# Patient Record
Sex: FEMALE | Race: OTHER | Employment: FULL TIME | URBAN - METROPOLITAN AREA
[De-identification: names, ages, dates, MRNs, and addresses within clinical notes are randomized per-mention and may not be internally consistent; named-entity substitution may affect disease eponyms.]

---

## 2018-12-26 ENCOUNTER — EMPLOYEE HEALTH (OUTPATIENT)
Dept: OCCUPATIONAL MEDICINE | Age: 28
End: 2018-12-26
Attending: PHYSICIAN ASSISTANT

## 2018-12-26 DIAGNOSIS — Z01.84 IMMUNITY STATUS TESTING: Primary | ICD-10-CM

## 2018-12-26 PROCEDURE — 86787 VARICELLA-ZOSTER ANTIBODY: CPT

## 2018-12-26 PROCEDURE — 86480 TB TEST CELL IMMUN MEASURE: CPT

## 2019-03-06 ENCOUNTER — OFFICE VISIT (OUTPATIENT)
Dept: INTERNAL MEDICINE CLINIC | Facility: CLINIC | Age: 29
End: 2019-03-06
Payer: COMMERCIAL

## 2019-03-06 VITALS
RESPIRATION RATE: 16 BRPM | HEIGHT: 61.5 IN | BODY MASS INDEX: 32.99 KG/M2 | HEART RATE: 94 BPM | SYSTOLIC BLOOD PRESSURE: 110 MMHG | WEIGHT: 177 LBS | DIASTOLIC BLOOD PRESSURE: 76 MMHG

## 2019-03-06 DIAGNOSIS — E66.9 CLASS 1 OBESITY WITHOUT SERIOUS COMORBIDITY WITH BODY MASS INDEX (BMI) OF 32.0 TO 32.9 IN ADULT, UNSPECIFIED OBESITY TYPE: ICD-10-CM

## 2019-03-06 DIAGNOSIS — Z51.81 THERAPEUTIC DRUG MONITORING: Primary | ICD-10-CM

## 2019-03-06 PROBLEM — E66.811 CLASS 1 OBESITY WITHOUT SERIOUS COMORBIDITY WITH BODY MASS INDEX (BMI) OF 32.0 TO 32.9 IN ADULT: Status: ACTIVE | Noted: 2019-03-06

## 2019-03-06 PROCEDURE — 93000 ELECTROCARDIOGRAM COMPLETE: CPT | Performed by: NURSE PRACTITIONER

## 2019-03-06 PROCEDURE — 99204 OFFICE O/P NEW MOD 45 MIN: CPT | Performed by: NURSE PRACTITIONER

## 2019-03-06 RX ORDER — MULTIVIT-MIN/IRON FUM/FOLIC AC 7.5 MG-4
1 TABLET ORAL DAILY
COMMUNITY
End: 2021-02-03 | Stop reason: ALTCHOICE

## 2019-03-06 NOTE — PATIENT INSTRUCTIONS
PLAN:  Continue with medications: vvyase 30mg (1 tablet in the morning daily)  Got to the lab for blood work   Follow up with me in 1 month  Schedule follow up appointments:  Dietitian/nutritionist      Please try to work on the following dietary changes: clarity, sleep, and richelle to your daily life.

## 2019-03-06 NOTE — PROGRESS NOTES
HISTORY OF PRESENT ILLNESS  Patient presents with:  Weight Problem: found us online. phentermine 6 months ago lost 15 pounds was on for 3 months.  3 days weekly exercise    Mandy Kennethanibal is a 34year old female new to our office today for initiation of me negative  Glaucoma: negative   Depression/anxiety: negative  Constipation: negative  DVT: negative  Family or personal history of Pancreatic issues / Medullary Thyroid Cancer: negative  History of bariatric surgery: negative    REVIEW OF SYSTEMS  GENERAL: daily. Disp:  Rfl:      No current facility-administered medications on file prior to visit.      ASSESSMENT/PLAN  (Z51.81) Therapeutic drug monitoring  (primary encounter diagnosis)  Plan: VITAMIN D, 25-HYDROXY, VITAMIN B12, LIPID         PANEL, HEMOGLOBIN good results. Total face to face time was 40 minutes, more than 50% of the time was spent in counseling and/or coordination of care related to obesity and healthy diet and exercise.     Discussed:  · Counseled on comprehensive weight loss plan including choose whole grain options or more protein or vegetables (4-6 servings of vegetables per day)  6. Get a good night of sleep  7. Try to decrease stress in life     Please download apps:  1.  \"My Fitness Pal\" (other option is Lose it)) to help you to [facility]

## 2019-03-13 ENCOUNTER — TELEPHONE (OUTPATIENT)
Dept: INTERNAL MEDICINE CLINIC | Facility: CLINIC | Age: 29
End: 2019-03-13

## 2019-03-13 ENCOUNTER — LAB ENCOUNTER (OUTPATIENT)
Dept: LAB | Age: 29
End: 2019-03-13
Attending: NURSE PRACTITIONER
Payer: COMMERCIAL

## 2019-03-13 DIAGNOSIS — Z51.81 THERAPEUTIC DRUG MONITORING: ICD-10-CM

## 2019-03-13 DIAGNOSIS — E66.9 CLASS 1 OBESITY WITHOUT SERIOUS COMORBIDITY WITH BODY MASS INDEX (BMI) OF 32.0 TO 32.9 IN ADULT, UNSPECIFIED OBESITY TYPE: ICD-10-CM

## 2019-03-13 LAB
ALBUMIN SERPL-MCNC: 3.8 G/DL (ref 3.4–5)
ALBUMIN/GLOB SERPL: 1 {RATIO} (ref 1–2)
ALP LIVER SERPL-CCNC: 64 U/L (ref 37–98)
ALT SERPL-CCNC: 16 U/L (ref 13–56)
ANION GAP SERPL CALC-SCNC: 7 MMOL/L (ref 0–18)
AST SERPL-CCNC: 13 U/L (ref 15–37)
BASOPHILS # BLD AUTO: 0.02 X10(3) UL (ref 0–0.2)
BASOPHILS NFR BLD AUTO: 0.4 %
BILIRUB SERPL-MCNC: 0.4 MG/DL (ref 0.1–2)
BUN BLD-MCNC: 7 MG/DL (ref 7–18)
BUN/CREAT SERPL: 9.1 (ref 10–20)
CALCIUM BLD-MCNC: 9.1 MG/DL (ref 8.5–10.1)
CHLORIDE SERPL-SCNC: 110 MMOL/L (ref 98–107)
CHOLEST SMN-MCNC: 154 MG/DL (ref ?–200)
CO2 SERPL-SCNC: 24 MMOL/L (ref 21–32)
CREAT BLD-MCNC: 0.77 MG/DL (ref 0.55–1.02)
DEPRECATED RDW RBC AUTO: 41.8 FL (ref 35.1–46.3)
EOSINOPHIL # BLD AUTO: 0.15 X10(3) UL (ref 0–0.7)
EOSINOPHIL NFR BLD AUTO: 3.3 %
ERYTHROCYTE [DISTWIDTH] IN BLOOD BY AUTOMATED COUNT: 12.6 % (ref 11–15)
EST. AVERAGE GLUCOSE BLD GHB EST-MCNC: 94 MG/DL (ref 68–126)
GLOBULIN PLAS-MCNC: 3.8 G/DL (ref 2.8–4.4)
GLUCOSE BLD-MCNC: 92 MG/DL (ref 70–99)
HBA1C MFR BLD HPLC: 4.9 % (ref ?–5.7)
HCT VFR BLD AUTO: 40.3 % (ref 35–48)
HDLC SERPL-MCNC: 44 MG/DL (ref 40–59)
HGB BLD-MCNC: 13.6 G/DL (ref 12–16)
IMM GRANULOCYTES # BLD AUTO: 0.02 X10(3) UL (ref 0–1)
IMM GRANULOCYTES NFR BLD: 0.4 %
LDLC SERPL CALC-MCNC: 95 MG/DL (ref ?–100)
LYMPHOCYTES # BLD AUTO: 1.98 X10(3) UL (ref 1–4)
LYMPHOCYTES NFR BLD AUTO: 43.5 %
M PROTEIN MFR SERPL ELPH: 7.6 G/DL (ref 6.4–8.2)
MCH RBC QN AUTO: 30.7 PG (ref 26–34)
MCHC RBC AUTO-ENTMCNC: 33.7 G/DL (ref 31–37)
MCV RBC AUTO: 91 FL (ref 80–100)
MONOCYTES # BLD AUTO: 0.3 X10(3) UL (ref 0.1–1)
MONOCYTES NFR BLD AUTO: 6.6 %
NEUTROPHILS # BLD AUTO: 2.08 X10 (3) UL (ref 1.5–7.7)
NEUTROPHILS # BLD AUTO: 2.08 X10(3) UL (ref 1.5–7.7)
NEUTROPHILS NFR BLD AUTO: 45.8 %
NONHDLC SERPL-MCNC: 110 MG/DL (ref ?–130)
OSMOLALITY SERPL CALC.SUM OF ELEC: 290 MOSM/KG (ref 275–295)
PLATELET # BLD AUTO: 263 10(3)UL (ref 150–450)
POTASSIUM SERPL-SCNC: 4.3 MMOL/L (ref 3.5–5.1)
RBC # BLD AUTO: 4.43 X10(6)UL (ref 3.8–5.3)
SODIUM SERPL-SCNC: 141 MMOL/L (ref 136–145)
TRIGL SERPL-MCNC: 73 MG/DL (ref 30–149)
VIT B12 SERPL-MCNC: 859 PG/ML (ref 193–986)
VIT D+METAB SERPL-MCNC: 21.6 NG/ML (ref 30–100)
VLDLC SERPL CALC-MCNC: 15 MG/DL (ref 0–30)
WBC # BLD AUTO: 4.6 X10(3) UL (ref 4–11)

## 2019-03-13 PROCEDURE — 85025 COMPLETE CBC W/AUTO DIFF WBC: CPT | Performed by: NURSE PRACTITIONER

## 2019-03-13 PROCEDURE — 80053 COMPREHEN METABOLIC PANEL: CPT | Performed by: NURSE PRACTITIONER

## 2019-03-13 PROCEDURE — 82306 VITAMIN D 25 HYDROXY: CPT | Performed by: NURSE PRACTITIONER

## 2019-03-13 PROCEDURE — 82607 VITAMIN B-12: CPT | Performed by: NURSE PRACTITIONER

## 2019-03-13 PROCEDURE — 80061 LIPID PANEL: CPT | Performed by: NURSE PRACTITIONER

## 2019-03-13 PROCEDURE — 83036 HEMOGLOBIN GLYCOSYLATED A1C: CPT | Performed by: NURSE PRACTITIONER

## 2019-03-15 NOTE — PROGRESS NOTES
Your blood work are within normal except:   A1c, b12 normal  Low Vitamin d :  please start taking ergocalciferol 50,000 U q week 8 weeks (please order quant #8 no refills).  Then start daily maintenance vitamin D 2000 Units    Cholesterol: stable    If you

## 2019-03-18 NOTE — TELEPHONE ENCOUNTER
Patient informed that Vyvanse is not going to give her extra energy and she needs to take it for the month and f/u to discuss. She will proceed.

## 2019-03-27 ENCOUNTER — OFFICE VISIT (OUTPATIENT)
Dept: PODIATRY CLINIC | Facility: CLINIC | Age: 29
End: 2019-03-27
Payer: COMMERCIAL

## 2019-03-27 ENCOUNTER — TELEPHONE (OUTPATIENT)
Dept: PODIATRY CLINIC | Facility: CLINIC | Age: 29
End: 2019-03-27

## 2019-03-27 DIAGNOSIS — M77.9 TENDONITIS: ICD-10-CM

## 2019-03-27 DIAGNOSIS — M79.671 PAIN IN BOTH FEET: Primary | ICD-10-CM

## 2019-03-27 DIAGNOSIS — M79.672 PAIN IN BOTH FEET: Primary | ICD-10-CM

## 2019-03-27 PROCEDURE — 99212 OFFICE O/P EST SF 10 MIN: CPT | Performed by: PODIATRIST

## 2019-03-27 PROCEDURE — 99203 OFFICE O/P NEW LOW 30 MIN: CPT | Performed by: PODIATRIST

## 2019-03-27 NOTE — TELEPHONE ENCOUNTER
S/w pt and she states she checked with her insurance ahs they cover orthotics, however she has a large deductible and the cost would go towards her deductible first. Pt wanted to know around how much orthotics cost. I informed her they roughly cost $424 fo

## 2019-03-27 NOTE — PROGRESS NOTES
HPI:    Patient ID: Margie Morgan is a 34year old female. This pleasant 27-year-old female presents to me as a new patient states that she is self-referred. Patient has been having pain over an extended period of time associated with both of her feet. depending upon shoes and added support I discussed further support and discussed long-term orthotic control. She was given information to seek approval for long-term orthotic control.   I modified her present temporary devices in an effort to give more sup

## 2019-04-03 ENCOUNTER — OFFICE VISIT (OUTPATIENT)
Dept: INTERNAL MEDICINE CLINIC | Facility: CLINIC | Age: 29
End: 2019-04-03
Payer: COMMERCIAL

## 2019-04-03 VITALS
HEART RATE: 108 BPM | BODY MASS INDEX: 31.31 KG/M2 | SYSTOLIC BLOOD PRESSURE: 116 MMHG | HEIGHT: 61.5 IN | DIASTOLIC BLOOD PRESSURE: 68 MMHG | RESPIRATION RATE: 16 BRPM | WEIGHT: 168 LBS

## 2019-04-03 DIAGNOSIS — E66.9 CLASS 1 OBESITY WITHOUT SERIOUS COMORBIDITY WITH BODY MASS INDEX (BMI) OF 32.0 TO 32.9 IN ADULT, UNSPECIFIED OBESITY TYPE: ICD-10-CM

## 2019-04-03 DIAGNOSIS — Z51.81 THERAPEUTIC DRUG MONITORING: Primary | ICD-10-CM

## 2019-04-03 DIAGNOSIS — Z71.3 DIETARY COUNSELING: ICD-10-CM

## 2019-04-03 PROCEDURE — 99215 OFFICE O/P EST HI 40 MIN: CPT | Performed by: NURSE PRACTITIONER

## 2019-04-03 RX ORDER — TOPIRAMATE 25 MG/1
25 TABLET ORAL 2 TIMES DAILY
Qty: 60 TABLET | Refills: 2 | Status: SHIPPED | OUTPATIENT
Start: 2019-04-03 | End: 2019-05-06

## 2019-04-03 NOTE — PROGRESS NOTES
Preventative Counseling for Diet and Exercise     /68   Pulse 108   Resp 16   Ht 61.5\"   Wt 168 lb   LMP 03/11/2019   BMI 31.23 kg/m²   Body mass index is 31.23 kg/m².     Wt Readings from Last 6 Encounters:  04/03/19 : 168 lb  03/06/19 : 177 lb    I

## 2019-04-03 NOTE — PROGRESS NOTES
HISTORY OF PRESENT ILLNESS  Patient presents with:  Weight Check: down 9 lbs    Heidi Floyd is a 34year old female here for follow up with medical weight loss program for the treatment of overweight, obesity, or morbid obesity.  Patient has lost -# 9 l bariatric surgery: negative    REVIEW OF SYSTEMS  GENERAL: negative for activity change, appetite change and fatigue  LUNGS: denies shortness of breath with exertion, no apnea  CARDIOVASCULAR: denies chest pain on exertion, denies palpitations or pedal kenneth tablet by mouth daily. Disp:  Rfl:      No current facility-administered medications on file prior to visit.      ASSESSMENT/PLAN  (Z51.81) Therapeutic drug monitoring  (primary encounter diagnosis)    (E66.9,  Z68.32) Class 1 obesity without serious comorb dietitian  -low carb high protein  -portion control  -Limit carbohydrates  -Eat breakfast every day   -Don't skip meals   -Read nutrition labels and keep a food log  -drink a lot of water 65 oz of water per day  - Do not drink your calories (no soda, juice be able to see if you are eating the right amount of calories, protein, carbs                With My Fitness Pal-->When you set-up the marcelino or need to adjust settings:                Goals should include:                  Lose 1.5-2 lbs per week

## 2019-04-03 NOTE — PATIENT INSTRUCTIONS
Keep up the great work! !     PLAN:  Continue with medications: vyvanse 40mg, Topamax: take 1 tablet before dinner for the first week (to decrease side effects) and than the second week--> increase to 1 tablet in the morning and 1 tablet before dinner (2 ta help with exercise/activity which takes 7 minutes of your day and that you can do at home! 3. \"Calm\" or \"Headspace\" which helps with mindfulness, meditation, clarity, sleep, and richelle to your daily life.

## 2019-05-05 PROBLEM — Z51.81 THERAPEUTIC DRUG MONITORING: Chronic | Status: ACTIVE | Noted: 2019-03-06

## 2019-05-06 ENCOUNTER — OFFICE VISIT (OUTPATIENT)
Dept: INTERNAL MEDICINE CLINIC | Facility: CLINIC | Age: 29
End: 2019-05-06
Payer: COMMERCIAL

## 2019-05-06 VITALS
HEART RATE: 100 BPM | BODY MASS INDEX: 32.24 KG/M2 | WEIGHT: 173 LBS | SYSTOLIC BLOOD PRESSURE: 120 MMHG | RESPIRATION RATE: 16 BRPM | DIASTOLIC BLOOD PRESSURE: 80 MMHG | HEIGHT: 61.5 IN

## 2019-05-06 DIAGNOSIS — E66.9 CLASS 1 OBESITY WITHOUT SERIOUS COMORBIDITY WITH BODY MASS INDEX (BMI) OF 32.0 TO 32.9 IN ADULT, UNSPECIFIED OBESITY TYPE: ICD-10-CM

## 2019-05-06 DIAGNOSIS — Z51.81 THERAPEUTIC DRUG MONITORING: Primary | ICD-10-CM

## 2019-05-06 PROCEDURE — 99214 OFFICE O/P EST MOD 30 MIN: CPT | Performed by: NURSE PRACTITIONER

## 2019-05-06 RX ORDER — DIETHYLPROPION HYDROCHLORIDE 75 MG/1
1 TABLET ORAL EVERY MORNING
Qty: 30 TABLET | Refills: 0 | Status: SHIPPED | OUTPATIENT
Start: 2019-05-06 | End: 2019-06-05

## 2019-05-06 NOTE — PROGRESS NOTES
HISTORY OF PRESENT ILLNESS  Patient presents with:  Weight Check: 5 pound weight gain    Judge Bueno is a 34year old female here for follow up with medical weight loss program for the treatment of overweight, obesity, or morbid obesity.  Patient has ga Pancreatic issues / Medullary Thyroid Cancer: negative  History of bariatric surgery: negative    REVIEW OF SYSTEMS  GENERAL: negative for activity change, appetite change and fatigue  LUNGS: denies shortness of breath with exertion, no apnea  CARDIOVASCUL (two) times daily. Refer to discharge instructions for dosing. (Patient taking differently: Take 25 mg by mouth daily. Taking 1 tablet daily ) Disp: 60 tablet Rfl: 2   Cholecalciferol (VITAMIN D3) 05231 units Oral Cap Take 1 capsule by mouth once a week.  Didier Adams loss  · Will continue with high dose vitamin d   Contraindications: has been off and on phentermine in the past w/o good results.     Discussed:  · Counseled on comprehensive weight loss plan including attention to nutrition, exercise and behavior/stress ma in life     Please download apps:  1.  \"My Fitness Pal\" (other option is Lose it)) to help you to monitor daily dietary intake and you will be able to see if you are eating the right amount of calories, protein, carbs                With My Fitness Pal-->

## 2019-05-06 NOTE — PATIENT INSTRUCTIONS
PLAN:  Continue with medications: diethyloprion 75mg, stop vvyase   Follow up with me in 1 month  Schedule follow up appointments:  Dietitian/nutritionist      Please try to work on the following dietary changes:  1.  Drink lots of water and cut down on sod

## 2019-07-18 ENCOUNTER — TELEPHONE (OUTPATIENT)
Dept: PODIATRY CLINIC | Facility: CLINIC | Age: 29
End: 2019-07-18

## 2019-07-18 NOTE — TELEPHONE ENCOUNTER
See te 3-27-19. Pt has question on orthotics. Pt has a high deductible and was given a phone number to a company to get the orthotics. Pt has misplaced the phone number.   Please call pt

## 2019-07-18 NOTE — TELEPHONE ENCOUNTER
Dr. Halina Lundborg please advise. Is there a company through which patient can get orthotics at a cheaper cash price? Deductible is very high at this time. Thank you.

## 2019-07-25 ENCOUNTER — OFFICE VISIT (OUTPATIENT)
Dept: OBGYN CLINIC | Facility: CLINIC | Age: 29
End: 2019-07-25
Payer: COMMERCIAL

## 2019-07-25 VITALS
BODY MASS INDEX: 32.47 KG/M2 | HEIGHT: 61.5 IN | DIASTOLIC BLOOD PRESSURE: 74 MMHG | SYSTOLIC BLOOD PRESSURE: 108 MMHG | WEIGHT: 174.19 LBS

## 2019-07-25 DIAGNOSIS — Z01.419 WELL WOMAN EXAM WITH ROUTINE GYNECOLOGICAL EXAM: Primary | ICD-10-CM

## 2019-07-25 DIAGNOSIS — R10.2 PELVIC PAIN: ICD-10-CM

## 2019-07-25 DIAGNOSIS — R51.9 GENERALIZED HEADACHES: ICD-10-CM

## 2019-07-25 PROCEDURE — 99385 PREV VISIT NEW AGE 18-39: CPT | Performed by: OBSTETRICS & GYNECOLOGY

## 2019-07-26 NOTE — PROGRESS NOTES
Annual Gyn Exam    HPI Radha Mathew is a new patient to our practice. She is here for an annual gynecologic exam.  She also has complaints of RLQ discomfort for the last few weeks. No nausea/vomiting, diarrhea/constipation.   Denies dysuria, hematuria or othe chills, fatigue and fever. HENT: Negative for hearing loss. Eyes: Negative for visual disturbance. Respiratory: Negative for cough, shortness of breath and wheezing. Cardiovascular: Negative for palpitations.    Gastrointestinal: Negative for abdo BMI 32.38 kg/m²     Assessment and Plan  1. Well woman exam with routine gynecological exam  A: 34 y.o. Victor Manuel Randhawa here for annual gynecologic evaluation  Exam, self breast exam demonstration done with pap smear.   Discussed contraception, currently trying to

## 2019-07-27 ENCOUNTER — APPOINTMENT (OUTPATIENT)
Dept: LAB | Facility: HOSPITAL | Age: 29
End: 2019-07-27
Attending: OBSTETRICS & GYNECOLOGY
Payer: COMMERCIAL

## 2019-07-27 DIAGNOSIS — R10.2 PELVIC PAIN: ICD-10-CM

## 2019-07-27 LAB
BILIRUB UR QL STRIP.AUTO: NEGATIVE
CLARITY UR REFRACT.AUTO: CLEAR
GLUCOSE UR STRIP.AUTO-MCNC: NEGATIVE MG/DL
KETONES UR STRIP.AUTO-MCNC: NEGATIVE MG/DL
LEUKOCYTE ESTERASE UR QL STRIP.AUTO: NEGATIVE
NITRITE UR QL STRIP.AUTO: NEGATIVE
PH UR STRIP.AUTO: 6 [PH] (ref 4.5–8)
PROT UR STRIP.AUTO-MCNC: NEGATIVE MG/DL
SP GR UR STRIP.AUTO: 1.01 (ref 1–1.03)
UROBILINOGEN UR STRIP.AUTO-MCNC: <2 MG/DL

## 2019-07-27 PROCEDURE — 87086 URINE CULTURE/COLONY COUNT: CPT

## 2019-07-27 PROCEDURE — 81001 URINALYSIS AUTO W/SCOPE: CPT

## 2019-07-30 ENCOUNTER — TELEPHONE (OUTPATIENT)
Dept: OBGYN CLINIC | Facility: CLINIC | Age: 29
End: 2019-07-30

## 2019-07-30 LAB
HPV I/H RISK 1 DNA SPEC QL NAA+PROBE: POSITIVE
HPV16 DNA CVX QL PROBE+SIG AMP: NEGATIVE
HPV18 DNA CVX QL PROBE+SIG AMP: NEGATIVE

## 2019-07-30 NOTE — TELEPHONE ENCOUNTER
Pt called and informed of message below and Dr. Joe Lowery recommendations--    ---- Message from Francia Amanda MD sent at 7/30/2019 12:18 PM CDT -----  Please contact patient and notify her that her Pap showed ASCUS with positive HPV.   She will need to

## 2019-07-30 NOTE — TELEPHONE ENCOUNTER
----- Message from Leigh Zimmerman MD sent at 7/30/2019 12:18 PM CDT -----  Please contact patient and notify her that her Pap showed ASCUS with positive HPV. She will need to return for a discussion on the results and a colposcopy.   These can both be d

## 2019-08-01 NOTE — TELEPHONE ENCOUNTER
Spoke to pt. Pt states she is continuing to have foot pain despite taking Advil and icing. Pt states she has a $5,000 deductible that has not been met and is willing to pay out of pocket. Pt asking about setting up a payment plan for the orthotics.  Gave pt

## 2019-08-07 NOTE — TELEPHONE ENCOUNTER
Pt Name and  verified. Pt has scheduled colp for next Tuesday at [facility] with ROMAN.

## 2019-08-13 ENCOUNTER — OFFICE VISIT (OUTPATIENT)
Dept: OBGYN CLINIC | Facility: CLINIC | Age: 29
End: 2019-08-13
Payer: COMMERCIAL

## 2019-08-13 VITALS — DIASTOLIC BLOOD PRESSURE: 70 MMHG | SYSTOLIC BLOOD PRESSURE: 117 MMHG

## 2019-08-13 DIAGNOSIS — R87.610 ASCUS OF CERVIX WITH NEGATIVE HIGH RISK HPV: Primary | ICD-10-CM

## 2019-08-13 DIAGNOSIS — Z01.812 PRE-PROCEDURAL LABORATORY EXAMINATION: ICD-10-CM

## 2019-08-13 LAB
CONTROL LINE PRESENT WITH A CLEAR BACKGROUND (YES/NO): YES YES/NO
KIT LOT #: NORMAL NUMERIC
PREGNANCY TEST, URINE: NEGATIVE

## 2019-08-13 PROCEDURE — 57454 BX/CURETT OF CERVIX W/SCOPE: CPT | Performed by: OBSTETRICS & GYNECOLOGY

## 2019-08-13 PROCEDURE — 81025 URINE PREGNANCY TEST: CPT | Performed by: OBSTETRICS & GYNECOLOGY

## 2019-08-13 NOTE — PROCEDURES
Patient counseled on the results of her abnormal Pap smear which showed ASCUS with positive high risk HPV. We reviewed cervical dysplasia, HPV and colposcopy. Patient counseled on risks and side effects of colposcopy.     [facility]  Obstetrics and Harjeet Tompkins

## 2019-08-20 ENCOUNTER — PATIENT MESSAGE (OUTPATIENT)
Dept: OBGYN CLINIC | Facility: CLINIC | Age: 29
End: 2019-08-20

## 2019-08-21 ENCOUNTER — HOSPITAL ENCOUNTER (OUTPATIENT)
Dept: ULTRASOUND IMAGING | Facility: HOSPITAL | Age: 29
Discharge: HOME | End: 2019-08-21
Attending: OBSTETRICS & GYNECOLOGY
Payer: COMMERCIAL

## 2019-08-21 DIAGNOSIS — R10.2 PELVIC PAIN: ICD-10-CM

## 2019-08-21 PROCEDURE — 76830 TRANSVAGINAL US NON-OB: CPT | Performed by: OBSTETRICS & GYNECOLOGY

## 2019-08-21 PROCEDURE — 76856 US EXAM PELVIC COMPLETE: CPT | Performed by: OBSTETRICS & GYNECOLOGY

## 2019-08-22 NOTE — TELEPHONE ENCOUNTER
Please have patient make an appointment to come in and discuss her colposcopic biopsy result. Please schedule her ASAP. I will be at the hospital tomorrow and if possible, she can be brought in after 3:30 and I can come and talk to her.

## 2019-08-22 NOTE — TELEPHONE ENCOUNTER
From: Lorenzo Beth  To:  Rosendo Kovacs MD  Sent: 8/20/2019 4:21 PM CDT  Subject: Test Results Question    I have a question about SURGICAL PATHOLOGY TISSUE resulted on 8/15/19 at 2:11 PM.    May you interpret these results and what the next

## 2019-08-22 NOTE — PROGRESS NOTES
Pt rc and will be able to come tomorrow at 3:30. Pt given instructions on how to get to [facility] office. Verbalized understanding.

## 2019-08-23 ENCOUNTER — TELEPHONE (OUTPATIENT)
Dept: OBGYN CLINIC | Facility: CLINIC | Age: 29
End: 2019-08-23

## 2019-08-23 ENCOUNTER — OFFICE VISIT (OUTPATIENT)
Dept: OBGYN CLINIC | Facility: CLINIC | Age: 29
End: 2019-08-23
Payer: COMMERCIAL

## 2019-08-23 VITALS — DIASTOLIC BLOOD PRESSURE: 73 MMHG | WEIGHT: 173 LBS | SYSTOLIC BLOOD PRESSURE: 111 MMHG | BODY MASS INDEX: 32 KG/M2

## 2019-08-23 DIAGNOSIS — D06.9 CIN III (CERVICAL INTRAEPITHELIAL NEOPLASIA GRADE III) WITH SEVERE DYSPLASIA: Primary | ICD-10-CM

## 2019-08-23 PROCEDURE — 99213 OFFICE O/P EST LOW 20 MIN: CPT | Performed by: OBSTETRICS & GYNECOLOGY

## 2019-08-24 NOTE — PROGRESS NOTES
[facility], [facility]. AND [facility]  Obstetrics and Gynecology  Focused Gynecology Problem Exam  Porfirio Arnold MD    [address] [facility] is a 34year old female presenting for Follow - Up (Colposcopy results)  . 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 We reviewed dysplasia and HPV. I counseled  that he does not need any screening or testing.   I co

## 2019-08-24 NOTE — TELEPHONE ENCOUNTER
Please schedule patient for an in office LEEP procedure at [facility].  We will need to have the appropriate nursing staff available. It appears that she is scheduled for an appointment on 8/27/2019 in [place] but this will need to be changed.

## 2019-08-26 ENCOUNTER — TELEPHONE (OUTPATIENT)
Dept: OBGYN CLINIC | Facility: CLINIC | Age: 29
End: 2019-08-26

## 2019-08-26 NOTE — TELEPHONE ENCOUNTER
Spoke with pt and offered time and date for Leep procedure. Pt requesting to have LEEP procedure on September 23rd the earliest time available.  Informed pt that office staff will look at this date to see if available and a call would be given back sometime

## 2019-08-27 NOTE — TELEPHONE ENCOUNTER
Breana Hyde RN 15 hours ago (5:35 PM)         Spoke with pt and offered time and date for Leep procedure. Pt requesting to have LEEP procedure on September 23rd the earliest time available.  Informed pt that office staff will look at this date to see i

## 2019-09-10 NOTE — TELEPHONE ENCOUNTER
[phone] (home)   LEEP scheduled on 08/23 at [facility] 1:00. Advised to arrive 15 minutes prior and premedicate 30- to an hour. Verbalized understanding. No further question.

## 2019-09-14 ENCOUNTER — HOSPITAL ENCOUNTER (OUTPATIENT)
Age: 29
Discharge: HOME | End: 2019-09-14
Attending: EMERGENCY MEDICINE
Payer: COMMERCIAL

## 2019-09-14 ENCOUNTER — APPOINTMENT (OUTPATIENT)
Dept: GENERAL RADIOLOGY | Age: 29
End: 2019-09-14
Attending: EMERGENCY MEDICINE
Payer: COMMERCIAL

## 2019-09-14 VITALS
HEIGHT: 62 IN | HEART RATE: 85 BPM | DIASTOLIC BLOOD PRESSURE: 70 MMHG | TEMPERATURE: 98 F | OXYGEN SATURATION: 100 % | WEIGHT: 168 LBS | BODY MASS INDEX: 30.91 KG/M2 | SYSTOLIC BLOOD PRESSURE: 105 MMHG | RESPIRATION RATE: 18 BRPM

## 2019-09-14 DIAGNOSIS — S92.415A NONDISPLACED FRACTURE OF PROXIMAL PHALANX OF LEFT GREAT TOE, INITIAL ENCOUNTER FOR CLOSED FRACTURE: Primary | ICD-10-CM

## 2019-09-14 PROCEDURE — 28490 TREAT BIG TOE FRACTURE: CPT

## 2019-09-14 PROCEDURE — 99213 OFFICE O/P EST LOW 20 MIN: CPT

## 2019-09-14 PROCEDURE — 99202 OFFICE O/P NEW SF 15 MIN: CPT

## 2019-09-14 PROCEDURE — 73660 X-RAY EXAM OF TOE(S): CPT | Performed by: EMERGENCY MEDICINE

## 2019-09-14 NOTE — ED PROVIDER NOTES
Patient Seen in: [facility] In [address] [place] [address]    History   Patient presents with:  Lower Extremity Injury (musculoskeletal)    Stated Complaint: left toe injury    HPI    The patient is a 80-year-old female with no significant past medica extremities bilaterally  Extremities: There is mild focal swelling and tenderness to the proximal phalanx and IP joint of the left great toe.   Skin: No pallor, no redness or warmth to the touch      ED Course   Labs Reviewed - No data to display       [facility]

## 2019-09-23 ENCOUNTER — OFFICE VISIT (OUTPATIENT)
Dept: OBGYN CLINIC | Facility: CLINIC | Age: 29
End: 2019-09-23
Payer: COMMERCIAL

## 2019-09-23 VITALS
HEART RATE: 90 BPM | WEIGHT: 178 LBS | SYSTOLIC BLOOD PRESSURE: 123 MMHG | BODY MASS INDEX: 33 KG/M2 | DIASTOLIC BLOOD PRESSURE: 77 MMHG

## 2019-09-23 DIAGNOSIS — D06.9 CIN III (CERVICAL INTRAEPITHELIAL NEOPLASIA GRADE III) WITH SEVERE DYSPLASIA: Primary | ICD-10-CM

## 2019-09-23 DIAGNOSIS — Z01.812 PRE-PROCEDURAL LABORATORY EXAMINATION: ICD-10-CM

## 2019-09-23 PROCEDURE — 81025 URINE PREGNANCY TEST: CPT | Performed by: OBSTETRICS & GYNECOLOGY

## 2019-09-23 PROCEDURE — 57460 BX OF CERVIX W/SCOPE LEEP: CPT | Performed by: OBSTETRICS & GYNECOLOGY

## 2019-09-23 NOTE — PROCEDURES
[facility]  Obstetrics and Gynecology  Loop Electrosurgical Excision Procedure (LEEP) Note  Abdirizak Mcleod MD    Loop Electrosurgical Excision Procedure (Leep): There were no vitals taken for this visit. LMP: No LMP recorded.   A: 34 y.o. Jonathan Kincaid

## 2019-09-30 ENCOUNTER — TELEPHONE (OUTPATIENT)
Dept: OBGYN CLINIC | Facility: CLINIC | Age: 29
End: 2019-09-30

## 2019-10-03 ENCOUNTER — OFFICE VISIT (OUTPATIENT)
Dept: PODIATRY CLINIC | Facility: CLINIC | Age: 29
End: 2019-10-03
Payer: COMMERCIAL

## 2019-10-03 DIAGNOSIS — M72.2 PLANTAR FASCIITIS, BILATERAL: ICD-10-CM

## 2019-10-03 DIAGNOSIS — S92.415A CLOSED NONDISPLACED FRACTURE OF PROXIMAL PHALANX OF LEFT GREAT TOE, INITIAL ENCOUNTER: Primary | ICD-10-CM

## 2019-10-03 PROCEDURE — 99213 OFFICE O/P EST LOW 20 MIN: CPT | Performed by: PODIATRIST

## 2019-10-03 NOTE — PROGRESS NOTES
HPI:    Patient ID: Lore Apple is a 34year old female. 59-year-old female presents for follow-up in reference to 2 issues. The recent issue is an injury to the left great toe.   She kicked a box of wood and in doing so broke her toe about 3 diagnosis)  Plantar fasciitis, bilateral    No orders of the defined types were placed in this encounter.       Meds This Visit:  Requested Prescriptions      No prescriptions requested or ordered in this encounter       Imaging & Referrals:  None       ID#

## 2019-10-07 ENCOUNTER — OFFICE VISIT (OUTPATIENT)
Dept: OBGYN CLINIC | Facility: CLINIC | Age: 29
End: 2019-10-07
Payer: COMMERCIAL

## 2019-10-07 VITALS
HEART RATE: 107 BPM | WEIGHT: 175 LBS | DIASTOLIC BLOOD PRESSURE: 74 MMHG | BODY MASS INDEX: 32 KG/M2 | SYSTOLIC BLOOD PRESSURE: 121 MMHG

## 2019-10-07 DIAGNOSIS — N87.1 CIN II (CERVICAL INTRAEPITHELIAL NEOPLASIA II): Primary | ICD-10-CM

## 2019-10-07 PROCEDURE — 99213 OFFICE O/P EST LOW 20 MIN: CPT | Performed by: OBSTETRICS & GYNECOLOGY

## 2019-10-08 NOTE — PROGRESS NOTES
[facility], [facility]  Obstetrics and Gynecology  Focused Gynecology Problem Exam  Gerson Babb MD    [address] is a 34year old female presenting for Follow - Up (2wks after LEEP procedure. )  . HPI:   Patient presents with:   Follow - Up: 2 Smoker      Smokeless tobacco: Never Used    Substance and Sexual Activity      Alcohol use: No        Frequency: Never      Drug use: Never      Sexual activity: Not on file    Lifestyle      Physical activity:        Days per week: Not on file        Min counseling the patient and/or coordinating care. ORDERS:   No orders of the defined types were placed in this encounter.     PRESCRIPTIONS:     Requested Prescriptions      No prescriptions requested or ordered in this encounter     IMAGING/ REFERRALS:

## 2019-10-29 ENCOUNTER — HOSPITAL ENCOUNTER (OUTPATIENT)
Dept: GENERAL RADIOLOGY | Facility: HOSPITAL | Age: 29
Discharge: HOME | End: 2019-10-29
Attending: PODIATRIST
Payer: COMMERCIAL

## 2019-10-29 ENCOUNTER — OFFICE VISIT (OUTPATIENT)
Dept: PODIATRY CLINIC | Facility: CLINIC | Age: 29
End: 2019-10-29
Payer: COMMERCIAL

## 2019-10-29 DIAGNOSIS — Z47.89 ORTHOPEDIC AFTERCARE: ICD-10-CM

## 2019-10-29 DIAGNOSIS — Z47.89 ORTHOPEDIC AFTERCARE: Primary | ICD-10-CM

## 2019-10-29 DIAGNOSIS — S92.415D CLOSED NONDISPLACED FRACTURE OF PROXIMAL PHALANX OF LEFT GREAT TOE WITH ROUTINE HEALING, SUBSEQUENT ENCOUNTER: ICD-10-CM

## 2019-10-29 PROCEDURE — L3000 FT INSERT UCB BERKELEY SHELL: HCPCS | Performed by: PODIATRIST

## 2019-10-29 PROCEDURE — 29799 UNLISTED PX CASTING/STRPG: CPT | Performed by: PODIATRIST

## 2019-10-29 PROCEDURE — 73630 X-RAY EXAM OF FOOT: CPT | Performed by: PODIATRIST

## 2019-10-29 NOTE — PROGRESS NOTES
HPI:    Patient ID: Drew Escamilla is a 34year old female. 77-year-old female presents for 2 reasons. The first is in reference to the fracture of the left great toe.   Patient states that she is doing extremely well and progressing as she woul

## 2019-11-27 ENCOUNTER — TELEPHONE (OUTPATIENT)
Dept: PODIATRY CLINIC | Facility: CLINIC | Age: 29
End: 2019-11-27

## 2019-11-27 NOTE — TELEPHONE ENCOUNTER
Called and left pt a VM to inform them orthotics are ready to be picked up. When pt calls back please schedule an appt with  at [facility] to do so. Thank You.

## 2020-01-21 ENCOUNTER — OFFICE VISIT (OUTPATIENT)
Dept: PODIATRY CLINIC | Facility: CLINIC | Age: 30
End: 2020-01-21
Payer: COMMERCIAL

## 2020-01-21 ENCOUNTER — OFFICE VISIT (OUTPATIENT)
Dept: FAMILY MEDICINE CLINIC | Facility: CLINIC | Age: 30
End: 2020-01-21
Payer: COMMERCIAL

## 2020-01-21 VITALS
OXYGEN SATURATION: 98 % | HEIGHT: 63 IN | DIASTOLIC BLOOD PRESSURE: 56 MMHG | BODY MASS INDEX: 34.2 KG/M2 | WEIGHT: 193 LBS | TEMPERATURE: 98 F | HEART RATE: 87 BPM | SYSTOLIC BLOOD PRESSURE: 121 MMHG | RESPIRATION RATE: 18 BRPM

## 2020-01-21 DIAGNOSIS — Z02.1 PHYSICAL EXAM, PRE-EMPLOYMENT: Primary | ICD-10-CM

## 2020-01-21 DIAGNOSIS — M72.2 PLANTAR FASCIITIS, BILATERAL: Primary | ICD-10-CM

## 2020-01-21 PROCEDURE — 99395 PREV VISIT EST AGE 18-39: CPT | Performed by: NURSE PRACTITIONER

## 2020-01-21 NOTE — PROGRESS NOTES
HPI:    Patient ID: Cristian Lyles is a 34year old female. 70-year-old female presents for the purpose of long-term orthotic. I reviewed their gradual break-in. And I do not anticipate areas of irritation.   Patient is well-informed and indica

## 2020-01-22 NOTE — PROGRESS NOTES
CHIEF COMPLAINT:     Employment Physical Exam    HPI:   Kaleb Mcgarry is a 34year old female who presents for an employment physical exam.     Company requesting exam: [employer]  Occupation: RN    Concerns today? None.       Current Outpati Wt 193 lb (87.5 kg)   SpO2 98%   BMI 34.19 kg/m²  Body mass index is 34.19 kg/m².    GENERAL: well developed, well nourished,in no apparent distress  SKIN: no rashes,no suspicious lesions  HEENT: atraumatic, normocephalic,ears and throat are clear  EYES:PE

## 2020-02-12 ENCOUNTER — TELEPHONE (OUTPATIENT)
Dept: OBGYN CLINIC | Facility: CLINIC | Age: 30
End: 2020-02-12

## 2020-02-12 NOTE — TELEPHONE ENCOUNTER
02/12/2020 Pt will be due for her 6 months follow up pap in March 2020. Due to GIFTY 11 on LEEP from 10/07/2019. Recall letter sent.            REID Lee

## 2020-03-10 ENCOUNTER — OFFICE VISIT (OUTPATIENT)
Dept: PODIATRY CLINIC | Facility: CLINIC | Age: 30
End: 2020-03-10
Payer: COMMERCIAL

## 2020-03-10 DIAGNOSIS — M72.2 PLANTAR FASCIITIS, BILATERAL: Primary | ICD-10-CM

## 2020-03-10 PROCEDURE — 99213 OFFICE O/P EST LOW 20 MIN: CPT | Performed by: PODIATRIST

## 2020-03-10 NOTE — PROGRESS NOTES
HPI:    Patient ID: Cristian Lyles is a 27year old female. This 27-year-old female presents for follow-up in reference to orthotic use. She has been wearing these devices for about 6 weeks.   She states that she removes them later in the aftern prescriptions requested or ordered in this encounter       Imaging & Referrals:  None       ZY#[id number]

## 2020-03-16 ENCOUNTER — OFFICE VISIT (OUTPATIENT)
Dept: OBGYN CLINIC | Facility: CLINIC | Age: 30
End: 2020-03-16
Payer: COMMERCIAL

## 2020-03-16 VITALS
DIASTOLIC BLOOD PRESSURE: 79 MMHG | SYSTOLIC BLOOD PRESSURE: 114 MMHG | BODY MASS INDEX: 32 KG/M2 | HEART RATE: 94 BPM | WEIGHT: 183 LBS

## 2020-03-16 DIAGNOSIS — N87.1 CIN II (CERVICAL INTRAEPITHELIAL NEOPLASIA II): Primary | ICD-10-CM

## 2020-03-16 PROCEDURE — 99213 OFFICE O/P EST LOW 20 MIN: CPT | Performed by: OBSTETRICS & GYNECOLOGY

## 2020-03-16 NOTE — PROGRESS NOTES
[facility], [facility]  Obstetrics and Gynecology  Focused Gynecology Problem Exam  Amber Schultz MD    [address] is a 27year old female presenting for Follow Up Pap  . HPI:   Patient presents with:   Follow Up Pap    Patient is here for a 6-m on file      Food insecurity:        Worry: Not on file        Inability: Not on file      Transportation needs:        Medical: Not on file        Non-medical: Not on file    Tobacco Use      Smoking status: Never Smoker      Smokeless tobacco: Never Used GIFTY-1/moderate dysplasia which extended to the borders.   The plan is to have a repeat Pap and HPV at 6 and 12 months.    (N87.1) GIFTY II (cervical intraepithelial neoplasia II)  (primary encounter diagnosis)  Plan: THINPREP PAP WITH HPV REFLEX REQUEST B

## 2020-03-19 LAB — HPV I/H RISK 1 DNA SPEC QL NAA+PROBE: NEGATIVE

## 2020-05-16 ENCOUNTER — TELEPHONE (OUTPATIENT)
Dept: INTERNAL MEDICINE CLINIC | Facility: HOSPITAL | Age: 30
End: 2020-05-16

## 2020-05-16 DIAGNOSIS — Z20.822 SUSPECTED COVID-19 VIRUS INFECTION: Primary | ICD-10-CM

## 2020-05-18 ENCOUNTER — LAB ENCOUNTER (OUTPATIENT)
Dept: LAB | Facility: HOSPITAL | Age: 30
End: 2020-05-18
Attending: PREVENTIVE MEDICINE
Payer: COMMERCIAL

## 2020-05-18 DIAGNOSIS — Z20.822 SUSPECTED COVID-19 VIRUS INFECTION: ICD-10-CM

## 2020-05-18 NOTE — IMMEDIATE CARE/WORKERS COMP PHYSICIAN REPORT
Called employee (verified by 2 identifiers) to give POSITIVE results of COVID test that were reviewed by Dr. Basilia Castro.     Per [facility] positive COVID guidelines, employee informed of the following per [facility] guidelines:    • Employee advised to communicate

## 2020-07-15 ENCOUNTER — TELEPHONE (OUTPATIENT)
Dept: OBGYN CLINIC | Facility: CLINIC | Age: 30
End: 2020-07-15

## 2020-07-15 NOTE — TELEPHONE ENCOUNTER
07/15/2020 Pt will be due for her 6 month follow up Pap. Due to GIFTY 2 on Leep Bx from 09/23/2019. Recall letter sent.        REID Roberts

## 2020-10-20 ENCOUNTER — EKG ENCOUNTER (OUTPATIENT)
Dept: LAB | Facility: HOSPITAL | Age: 30
End: 2020-10-20
Attending: FAMILY MEDICINE
Payer: COMMERCIAL

## 2020-10-20 ENCOUNTER — TELEPHONE (OUTPATIENT)
Dept: FAMILY MEDICINE CLINIC | Facility: CLINIC | Age: 30
End: 2020-10-20

## 2020-10-20 ENCOUNTER — OFFICE VISIT (OUTPATIENT)
Dept: FAMILY MEDICINE CLINIC | Facility: CLINIC | Age: 30
End: 2020-10-20
Payer: COMMERCIAL

## 2020-10-20 ENCOUNTER — HOSPITAL ENCOUNTER (OUTPATIENT)
Dept: GENERAL RADIOLOGY | Facility: HOSPITAL | Age: 30
Discharge: HOME | End: 2020-10-20
Attending: FAMILY MEDICINE
Payer: COMMERCIAL

## 2020-10-20 VITALS
BODY MASS INDEX: 31.18 KG/M2 | SYSTOLIC BLOOD PRESSURE: 110 MMHG | WEIGHT: 176 LBS | HEIGHT: 63 IN | DIASTOLIC BLOOD PRESSURE: 70 MMHG | OXYGEN SATURATION: 98 % | HEART RATE: 87 BPM

## 2020-10-20 DIAGNOSIS — R00.2 PALPITATION: ICD-10-CM

## 2020-10-20 DIAGNOSIS — Z86.16 HISTORY OF 2019 NOVEL CORONAVIRUS DISEASE (COVID-19): ICD-10-CM

## 2020-10-20 DIAGNOSIS — Z71.3 WEIGHT LOSS COUNSELING, ENCOUNTER FOR: ICD-10-CM

## 2020-10-20 DIAGNOSIS — G44.52 NEW DAILY PERSISTENT HEADACHE: ICD-10-CM

## 2020-10-20 DIAGNOSIS — Z00.00 WELLNESS EXAMINATION: Primary | ICD-10-CM

## 2020-10-20 DIAGNOSIS — Z00.00 HEALTHCARE MAINTENANCE: ICD-10-CM

## 2020-10-20 DIAGNOSIS — Z23 NEED FOR VACCINATION: ICD-10-CM

## 2020-10-20 DIAGNOSIS — R06.00 DOE (DYSPNEA ON EXERTION): ICD-10-CM

## 2020-10-20 PROCEDURE — 93010 ELECTROCARDIOGRAM REPORT: CPT | Performed by: FAMILY MEDICINE

## 2020-10-20 PROCEDURE — 80050 GENERAL HEALTH PANEL: CPT | Performed by: FAMILY MEDICINE

## 2020-10-20 PROCEDURE — 80061 LIPID PANEL: CPT | Performed by: FAMILY MEDICINE

## 2020-10-20 PROCEDURE — 99203 OFFICE O/P NEW LOW 30 MIN: CPT | Performed by: FAMILY MEDICINE

## 2020-10-20 PROCEDURE — 3074F SYST BP LT 130 MM HG: CPT | Performed by: FAMILY MEDICINE

## 2020-10-20 PROCEDURE — 83036 HEMOGLOBIN GLYCOSYLATED A1C: CPT | Performed by: FAMILY MEDICINE

## 2020-10-20 PROCEDURE — 82306 VITAMIN D 25 HYDROXY: CPT | Performed by: FAMILY MEDICINE

## 2020-10-20 PROCEDURE — 99385 PREV VISIT NEW AGE 18-39: CPT | Performed by: FAMILY MEDICINE

## 2020-10-20 PROCEDURE — 3078F DIAST BP <80 MM HG: CPT | Performed by: FAMILY MEDICINE

## 2020-10-20 PROCEDURE — 90471 IMMUNIZATION ADMIN: CPT | Performed by: FAMILY MEDICINE

## 2020-10-20 PROCEDURE — 3008F BODY MASS INDEX DOCD: CPT | Performed by: FAMILY MEDICINE

## 2020-10-20 PROCEDURE — 71046 X-RAY EXAM CHEST 2 VIEWS: CPT | Performed by: FAMILY MEDICINE

## 2020-10-20 PROCEDURE — 90686 IIV4 VACC NO PRSV 0.5 ML IM: CPT | Performed by: FAMILY MEDICINE

## 2020-10-20 PROCEDURE — 93005 ELECTROCARDIOGRAM TRACING: CPT

## 2020-10-20 PROCEDURE — 36415 COLL VENOUS BLD VENIPUNCTURE: CPT | Performed by: FAMILY MEDICINE

## 2020-10-20 RX ORDER — ALBUTEROL SULFATE 90 UG/1
2 AEROSOL, METERED RESPIRATORY (INHALATION) EVERY 6 HOURS PRN
Qty: 1 INHALER | Refills: 0 | Status: SHIPPED | OUTPATIENT
Start: 2020-10-20 | End: 2021-04-22

## 2020-10-20 RX ORDER — TOPIRAMATE 25 MG/1
25 TABLET ORAL 2 TIMES DAILY
Qty: 60 TABLET | Refills: 0 | Status: SHIPPED | OUTPATIENT
Start: 2020-10-20 | End: 2021-02-03 | Stop reason: ALTCHOICE

## 2020-10-20 NOTE — TELEPHONE ENCOUNTER
----- Message from Giovani Fountain MD sent at 10/20/2020  3:25 PM CDT -----  Please let her know CXR normal

## 2020-10-20 NOTE — TELEPHONE ENCOUNTER
----- Message from Dena Jean MD sent at 10/20/2020  3:25 PM CDT -----  Please let her know RIANNA montoyal

## 2020-10-20 NOTE — PROGRESS NOTES
VITAMIN D,CMP,LIPID,TSH,CBC, and A1C labs drawn per Dr Edward Darby orders, Patient tolerated lab draw well    FLULAVAL 0.5ml administered to LD IM, VIS given to patient, Patient tolerated vaccine well

## 2020-10-26 ENCOUNTER — TELEPHONE (OUTPATIENT)
Dept: FAMILY MEDICINE CLINIC | Facility: CLINIC | Age: 30
End: 2020-10-26

## 2020-10-26 DIAGNOSIS — G44.52 NEW DAILY PERSISTENT HEADACHE: Primary | ICD-10-CM

## 2020-10-26 DIAGNOSIS — E78.1 HIGH BLOOD TRIGLYCERIDES: Primary | ICD-10-CM

## 2020-10-26 NOTE — TELEPHONE ENCOUNTER
Results were discussed with patient, she wants to get checked again for cholesterol because she was not fasting and she claims she told Dr Marlon Olivarez not to run a Lipid panel because of the same reason.       ----- Message from Lavonne Solorzano MD sen

## 2020-10-26 NOTE — TELEPHONE ENCOUNTER
New order placed for patient to get Lipid panel again, message left to inform her and to make sure she is fasting this time.

## 2020-10-27 NOTE — TELEPHONE ENCOUNTER
Patient's order of CT brain/head went to [facility]. Patient does not want to go to [facility] as it is too far for her. She would like orders to be re-entered to get done at [facility]. Cancelled [facility] order of CT brain/head. New order; pending auth.

## 2020-10-28 ENCOUNTER — TELEPHONE (OUTPATIENT)
Dept: FAMILY MEDICINE CLINIC | Facility: CLINIC | Age: 30
End: 2020-10-28

## 2020-10-28 NOTE — TELEPHONE ENCOUNTER
Name and  verified. CT Head (from [facility]) results given to patient. WNL -- no evidence of mass/edema. Results sent to scanning.

## 2020-10-28 NOTE — TELEPHONE ENCOUNTER
Patient already went to [facility]; received copy of report CT Head w/o contrast dated 10/27/2020. Different facility  Received: Today  Message Contents   ANIBAL Wilson             Good morning,     I apologize.  Initially, patient

## 2020-10-28 NOTE — TELEPHONE ENCOUNTER
RE: Different facility  Received:  Today  Message Contents   Jai Bruce RN              Good morning! Elda Almeida is an employee and doesn't require so there is no facility on the referral itself

## 2020-11-02 ENCOUNTER — OFFICE VISIT (OUTPATIENT)
Dept: OBGYN CLINIC | Facility: CLINIC | Age: 30
End: 2020-11-02
Payer: COMMERCIAL

## 2020-11-02 ENCOUNTER — TELEPHONE (OUTPATIENT)
Dept: FAMILY MEDICINE CLINIC | Facility: CLINIC | Age: 30
End: 2020-11-02

## 2020-11-02 VITALS
SYSTOLIC BLOOD PRESSURE: 119 MMHG | DIASTOLIC BLOOD PRESSURE: 74 MMHG | HEART RATE: 92 BPM | WEIGHT: 171 LBS | BODY MASS INDEX: 30 KG/M2

## 2020-11-02 DIAGNOSIS — R87.810 CERVICAL HIGH RISK HUMAN PAPILLOMAVIRUS (HPV) DNA TEST POSITIVE: ICD-10-CM

## 2020-11-02 DIAGNOSIS — Z01.419 WOMEN'S ANNUAL ROUTINE GYNECOLOGICAL EXAMINATION: Primary | ICD-10-CM

## 2020-11-02 DIAGNOSIS — Z31.69 INFERTILITY COUNSELING: ICD-10-CM

## 2020-11-02 DIAGNOSIS — N87.1 MODERATE DYSPLASIA OF CERVIX (CIN II): ICD-10-CM

## 2020-11-02 PROCEDURE — 3078F DIAST BP <80 MM HG: CPT | Performed by: OBSTETRICS & GYNECOLOGY

## 2020-11-02 PROCEDURE — 99395 PREV VISIT EST AGE 18-39: CPT | Performed by: OBSTETRICS & GYNECOLOGY

## 2020-11-02 PROCEDURE — 3074F SYST BP LT 130 MM HG: CPT | Performed by: OBSTETRICS & GYNECOLOGY

## 2020-11-02 NOTE — TELEPHONE ENCOUNTER
----- Message from Jessica Fitch MD sent at 10/30/2020  2:48 PM CDT -----  Please let her know normal CT

## 2020-11-02 NOTE — PROGRESS NOTES
Annual Gyn Exam    HPI  Belinda Celaya is a known patient of mine and is here for a repeat Pap and HPV due to prior GIFTY-2–3 as well as an annual gynecologic evaluation. She had a colposcopy with cervical biopsy in August 2019 that showed GIFTY-3.   She had a LEEP Never Used    Alcohol use: No      Frequency: Never    Drug use: Never       Pt denies domestic violence, abuse or safety issues  Pt denies depression, frequent crying, sadness or suicidal/homicidal ideations  Patient is currently sexually active with no c labia. Cervix exhibits no motion tenderness, no discharge and no friability. Right adnexum displays no mass, no tenderness and no fullness. Left adnexum displays no mass, no tenderness and no fullness. Lymphadenopathy:     She has no cervical adenopathy. anti-müllerian hormone. Discussed testing for ovulatory issues with TSH and at times prolactin. Discussed need for semen analysis since 40% of fertility issues may be male factor. Discussed HSG for evaluation of tubal patency.   I counseled patient that

## 2020-11-09 ENCOUNTER — LAB ENCOUNTER (OUTPATIENT)
Dept: LAB | Facility: HOSPITAL | Age: 30
End: 2020-11-09
Attending: FAMILY MEDICINE
Payer: COMMERCIAL

## 2020-11-09 DIAGNOSIS — E78.1 HIGH BLOOD TRIGLYCERIDES: ICD-10-CM

## 2020-11-09 PROCEDURE — 36415 COLL VENOUS BLD VENIPUNCTURE: CPT

## 2020-11-09 PROCEDURE — 80061 LIPID PANEL: CPT

## 2020-11-10 ENCOUNTER — TELEPHONE (OUTPATIENT)
Dept: FAMILY MEDICINE CLINIC | Facility: CLINIC | Age: 30
End: 2020-11-10

## 2020-11-10 ENCOUNTER — HOSPITAL ENCOUNTER (EMERGENCY)
Facility: HOSPITAL | Age: 30
Discharge: HOME | End: 2020-11-10
Payer: COMMERCIAL

## 2020-11-10 NOTE — TELEPHONE ENCOUNTER
----- Message from Clemencia Rehman MD sent at 11/10/2020  2:04 PM CST -----  Please let her know her cholesterol looks great

## 2020-11-12 DIAGNOSIS — G44.52 NEW DAILY PERSISTENT HEADACHE: ICD-10-CM

## 2020-11-12 DIAGNOSIS — Z71.3 WEIGHT LOSS COUNSELING, ENCOUNTER FOR: ICD-10-CM

## 2020-11-12 DIAGNOSIS — R06.09 DOE (DYSPNEA ON EXERTION): ICD-10-CM

## 2020-11-14 RX ORDER — TOPIRAMATE 25 MG/1
TABLET ORAL
Qty: 60 TABLET | Refills: 0 | OUTPATIENT
Start: 2020-11-14

## 2020-11-14 RX ORDER — ALBUTEROL SULFATE 90 UG/1
AEROSOL, METERED RESPIRATORY (INHALATION)
Qty: 6.7 INHALER | Refills: 0 | OUTPATIENT
Start: 2020-11-14

## 2020-12-02 ENCOUNTER — TELEPHONE (OUTPATIENT)
Dept: OBGYN CLINIC | Facility: CLINIC | Age: 30
End: 2020-12-02

## 2020-12-03 ENCOUNTER — TELEPHONE (OUTPATIENT)
Dept: OBGYN CLINIC | Facility: CLINIC | Age: 30
End: 2020-12-03

## 2020-12-03 NOTE — TELEPHONE ENCOUNTER
PT NEEDS  PAP RESULTS FROM [facility] 2020 AND PHYSICAL 11/2/200 MANUAL BREAST EXAM FAXED [phone] ATT JABARI

## 2020-12-05 ENCOUNTER — PATIENT MESSAGE (OUTPATIENT)
Dept: PODIATRY CLINIC | Facility: CLINIC | Age: 30
End: 2020-12-05

## 2020-12-07 NOTE — PROGRESS NOTES
CC: Annual Physical Exam    HPI:   Angella Wren is a 27year old female who presents for a complete physical exam.    HCM  -Diet:  Well-balanced.   -Exercise active  -Menses: regular cycles.  No heavy bleeding or cramping  -Pap Smear: last pap w REFLEX TO FREE T4   Result Value Ref Range    TSH 2.270 0.358 - 3.740 mIU/mL   VITAMIN D, 25-HYDROXY   Result Value Ref Range    Vitamin D, 25OH, Total 16.7 (L) 30.0 - 100.0 ng/mL   CBC W/ DIFFERENTIAL   Result Value Ref Range    WBC 5.6 4.0 - 11.0 x10(3) Lipids Father    • Diabetes Mother    • Breast Cancer Neg    • Ovarian Cancer Neg    • Colon Cancer Neg    • Uterine Cancer Neg       Social History:   Social History    Tobacco Use      Smoking status: Never Smoker      Smokeless tobacco: Never Used    Al range of motion. Neurological: She is alert. No cranial nerve deficit, sensory deficit or motor deficit. Coordination and gait normal.   Skin: Skin is intact. No rash noted. Psychiatric: She has a normal mood and affect.            ASSESSMENT AND PLAN: total) by mouth 2 (two) times daily.  -Discussed BMI target.  Discussed calorie counting, diet, exercise    Need for vaccination  -     FLULAVAL INFLUENZA VACCINE QUAD PRESERVATIVE FREE 0.5 ML         There are no preventive care reminders to display for th

## 2020-12-07 NOTE — TELEPHONE ENCOUNTER
LOV 3/10/20 and orthotics were made in 2019. Are you ok with making another pair of orthotics for pt if insurance allows?

## 2020-12-16 NOTE — TELEPHONE ENCOUNTER
Dr. Khalida Stephens dd you want pt to come to the office for casting or to have [facility] make another pair based off of molds made in 10/29/20?

## 2020-12-16 NOTE — TELEPHONE ENCOUNTER
Sorry, somewhat confused? I havent seen this patient since march, at that time I added to the orthoses I made last October(2019) . If  is asking about a new device I should be taking a new impression and not making a device from an old impression.  Thank

## 2020-12-19 ENCOUNTER — PATIENT MESSAGE (OUTPATIENT)
Dept: PODIATRY CLINIC | Facility: CLINIC | Age: 30
End: 2020-12-19

## 2020-12-23 ENCOUNTER — OFFICE VISIT (OUTPATIENT)
Dept: PODIATRY CLINIC | Facility: CLINIC | Age: 30
End: 2020-12-23
Payer: COMMERCIAL

## 2020-12-23 ENCOUNTER — TELEPHONE (OUTPATIENT)
Dept: PODIATRY CLINIC | Facility: CLINIC | Age: 30
End: 2020-12-23

## 2020-12-23 DIAGNOSIS — M72.2 PLANTAR FASCIITIS, BILATERAL: Primary | ICD-10-CM

## 2020-12-23 PROCEDURE — L3000 FT INSERT UCB BERKELEY SHELL: HCPCS | Performed by: PODIATRIST

## 2020-12-23 PROCEDURE — 29799 UNLISTED PX CASTING/STRPG: CPT | Performed by: PODIATRIST

## 2020-12-23 NOTE — PROGRESS NOTES
HPI:    Patient ID: Cornelio Soria is a 27year old female. This 28-year-old female presents for long-term orthotic control. Approval has been received for this treatment.   An impression was taken of both feet and subtalar joint neutral positio

## 2021-01-16 ENCOUNTER — IMMUNIZATION (OUTPATIENT)
Dept: LAB | Facility: HOSPITAL | Age: 31
End: 2021-01-16
Attending: EMERGENCY MEDICINE
Payer: COMMERCIAL

## 2021-01-16 DIAGNOSIS — Z23 NEED FOR VACCINATION: Primary | ICD-10-CM

## 2021-01-16 PROCEDURE — 0011A SARSCOV2 VAC 100MCG/0.5ML IM: CPT

## 2021-01-20 NOTE — TELEPHONE ENCOUNTER
[employer] message has been sent to patient informing her of her Orthotics. Patient was notified to make appointment with Dr. Steven Padilla at the [facility].      Pls note

## 2021-02-03 ENCOUNTER — OFFICE VISIT (OUTPATIENT)
Dept: PODIATRY CLINIC | Facility: CLINIC | Age: 31
End: 2021-02-03
Payer: COMMERCIAL

## 2021-02-03 DIAGNOSIS — M72.2 PLANTAR FASCIITIS, BILATERAL: Primary | ICD-10-CM

## 2021-02-03 NOTE — PROGRESS NOTES
HPI:    Patient ID: Hany Guerrero is a 27year old female. 27-year-old female presents for dispensing of new orthoses. They appear to be of proper fit. I do not anticipate areas of irritation.   Based on the fact that she has been wearing them

## 2021-02-07 ENCOUNTER — HOSPITAL ENCOUNTER (EMERGENCY)
Facility: HOSPITAL | Age: 31
Discharge: HOME | End: 2021-02-07
Payer: COMMERCIAL

## 2021-02-07 ENCOUNTER — TELEPHONE (OUTPATIENT)
Dept: INTERNAL MEDICINE CLINIC | Facility: HOSPITAL | Age: 31
End: 2021-02-07

## 2021-02-07 DIAGNOSIS — Z20.822 COVID-19 RULED OUT: Primary | ICD-10-CM

## 2021-02-07 DIAGNOSIS — Z20.822 SUSPECTED COVID-19 VIRUS INFECTION: ICD-10-CM

## 2021-02-07 LAB — SARS-COV-2 RNA RESP QL NAA+PROBE: NOT DETECTED

## 2021-02-07 PROCEDURE — 99283 EMERGENCY DEPT VISIT LOW MDM: CPT

## 2021-02-07 NOTE — TELEPHONE ENCOUNTER
.      Department: ER                        [x] [facility]  []FREDI   [] [facility]    Dept Manager/Supervisor/team or clinical lead: Harini Nowak    Position:  [] MD     [x] RN     [] Respiratory Therapist     [] PCT     [] Other     What shift do you work?   Day  HAVE YO scheduled to work? Feb. 8, 2021    Did you have close contact with someone on your unit while not wearing a mask? (e.g., during meal breaks):  Yes []   No [x]    If yes, who:   Do you share a workspace? Yes []   No [x]       If yes, with whom?    Do you hav

## 2021-02-08 NOTE — TELEPHONE ENCOUNTER
Results and RTW guidelines:    COVID RESULT GIVEN:      Test type:    [x] Rapid         []       [x] NEGATIVE     Ordered  retest?  []Yes   [] No (skip to RTW)       Date ordered:  2/8/21             Dated to be taken:  2/9/21    If Yes, PLACE RTW date:  2/9/21    [x] The employee voiced understanding of above plan/instructions  [x] Manager Notified/Labor  Notified, if pertinent

## 2021-02-09 ENCOUNTER — PATIENT MESSAGE (OUTPATIENT)
Dept: FAMILY MEDICINE CLINIC | Facility: CLINIC | Age: 31
End: 2021-02-09

## 2021-02-09 DIAGNOSIS — E55.9 VITAMIN D INSUFFICIENCY: Primary | ICD-10-CM

## 2021-02-09 DIAGNOSIS — G44.52 NEW DAILY PERSISTENT HEADACHE: ICD-10-CM

## 2021-02-09 DIAGNOSIS — Z71.3 WEIGHT LOSS COUNSELING, ENCOUNTER FOR: ICD-10-CM

## 2021-02-10 ENCOUNTER — LAB ENCOUNTER (OUTPATIENT)
Dept: LAB | Facility: HOSPITAL | Age: 31
End: 2021-02-10
Attending: PREVENTIVE MEDICINE

## 2021-02-10 DIAGNOSIS — Z20.822 SUSPECTED COVID-19 VIRUS INFECTION: ICD-10-CM

## 2021-02-11 LAB — SARS-COV-2 RNA RESP QL NAA+PROBE: NOT DETECTED

## 2021-02-11 RX ORDER — TOPIRAMATE 25 MG/1
25 TABLET ORAL 2 TIMES DAILY
Qty: 60 TABLET | Refills: 1 | Status: SHIPPED | OUTPATIENT
Start: 2021-02-11 | End: 2021-04-22

## 2021-02-12 NOTE — TELEPHONE ENCOUNTER
Left voice message to call back . Wanted to check if is still exp Sx. Pt saw results via My Chart, will send message via my Chart as well.

## 2021-02-13 ENCOUNTER — TELEPHONE (OUTPATIENT)
Dept: INTERNAL MEDICINE CLINIC | Facility: HOSPITAL | Age: 31
End: 2021-02-13

## 2021-02-13 ENCOUNTER — IMMUNIZATION (OUTPATIENT)
Dept: LAB | Facility: HOSPITAL | Age: 31
End: 2021-02-13
Attending: PREVENTIVE MEDICINE
Payer: COMMERCIAL

## 2021-02-13 DIAGNOSIS — Z23 NEED FOR VACCINATION: Primary | ICD-10-CM

## 2021-02-13 PROCEDURE — 0012A SARSCOV2 VAC 100MCG/0.5ML IM: CPT

## 2021-02-13 NOTE — TELEPHONE ENCOUNTER
Several attempts made to contact employee to go over s/s and results. Unable to make contact via telephone, voicemail's left with no call back.  [employer] message sent by Carlee WEST RN with results and further instructions, noted that was viewed by Doug Begum at [facility] am.

## 2021-03-05 DIAGNOSIS — Z71.3 WEIGHT LOSS COUNSELING, ENCOUNTER FOR: ICD-10-CM

## 2021-03-05 DIAGNOSIS — G44.52 NEW DAILY PERSISTENT HEADACHE: ICD-10-CM

## 2021-03-05 RX ORDER — TOPIRAMATE 25 MG/1
TABLET ORAL
Qty: 60 TABLET | Refills: 1 | OUTPATIENT
Start: 2021-03-05

## 2021-03-17 ENCOUNTER — TELEPHONE (OUTPATIENT)
Dept: FAMILY MEDICINE CLINIC | Facility: CLINIC | Age: 31
End: 2021-03-17

## 2021-04-06 ENCOUNTER — PATIENT MESSAGE (OUTPATIENT)
Dept: OBGYN CLINIC | Facility: CLINIC | Age: 31
End: 2021-04-06

## 2021-04-06 NOTE — TELEPHONE ENCOUNTER
Pt Name and  verified. Pt states that she has some pink/brown tinged discharge. Cramping has gone away. Pt informed that due to her being so early this can be implantation building.  reviewed miscarriage precautions with pt as well and she has schedul

## 2021-04-06 NOTE — TELEPHONE ENCOUNTER
From: Kenton Sy  To: Jazmin Bueno MD, MD  Sent: 4/6/2021 5:03 AM CDT  Subject: Non-Urgent Medical Question    Hello,     Good morning, I have a question or advice for what I should do, I am late 4 days on my period and a few pregnancy t

## 2021-04-21 ENCOUNTER — TELEPHONE (OUTPATIENT)
Dept: OBGYN CLINIC | Facility: CLINIC | Age: 31
End: 2021-04-21

## 2021-04-21 NOTE — TELEPHONE ENCOUNTER
Patient would like Dr Re Stewart approval to bring her  and son to tomorrow's missed menses exam.  Please call. It is ok to leave a voice mail message.

## 2021-04-21 NOTE — TELEPHONE ENCOUNTER
Please notify patient that her  may come to her pregnancy confirmation visit. However, I believe our [facility] policy still is that children under the 18 cannot be a support person or come to visits.

## 2021-04-22 ENCOUNTER — OFFICE VISIT (OUTPATIENT)
Dept: OBGYN CLINIC | Facility: CLINIC | Age: 31
End: 2021-04-22
Payer: COMMERCIAL

## 2021-04-22 VITALS
SYSTOLIC BLOOD PRESSURE: 98 MMHG | WEIGHT: 181 LBS | HEART RATE: 85 BPM | BODY MASS INDEX: 32 KG/M2 | DIASTOLIC BLOOD PRESSURE: 65 MMHG

## 2021-04-22 DIAGNOSIS — Z32.01 PREGNANCY EXAMINATION OR TEST, POSITIVE RESULT: Primary | ICD-10-CM

## 2021-04-22 DIAGNOSIS — N92.6 MISSED MENSES: ICD-10-CM

## 2021-04-22 PROCEDURE — 81025 URINE PREGNANCY TEST: CPT | Performed by: OBSTETRICS & GYNECOLOGY

## 2021-04-22 PROCEDURE — 3078F DIAST BP <80 MM HG: CPT | Performed by: OBSTETRICS & GYNECOLOGY

## 2021-04-22 PROCEDURE — 99213 OFFICE O/P EST LOW 20 MIN: CPT | Performed by: OBSTETRICS & GYNECOLOGY

## 2021-04-22 PROCEDURE — 3074F SYST BP LT 130 MM HG: CPT | Performed by: OBSTETRICS & GYNECOLOGY

## 2021-04-22 RX ORDER — PRENATAL VIT/IRON FUM/FOLIC AC 27MG-0.8MG
1 TABLET ORAL DAILY
COMMUNITY

## 2021-04-22 NOTE — PROGRESS NOTES
[facility], [facility]  Obstetrics and Gynecology  Pregnancy Confirmation  Salbador Hunt MD    HPI     [address] is a 32year old  with Patient's last menstrual period was 2021 (exact date). who presents for pregnancy confirmation. Lipids Father    • Diabetes Mother    • Breast Cancer Neg    • Ovarian Cancer Neg    • Colon Cancer Neg    • Uterine Cancer Neg      Social History     Exam   BP 98/65   Pulse 85   Wt 181 lb (82.1 kg)   LMP 03/06/2021 (Exact Date)   BMI 32.06 kg/m²     GEN

## 2021-05-10 ENCOUNTER — ROUTINE PRENATAL (OUTPATIENT)
Dept: OBGYN CLINIC | Facility: CLINIC | Age: 31
End: 2021-05-10
Payer: COMMERCIAL

## 2021-05-10 DIAGNOSIS — Z32.01 PREGNANCY EXAMINATION OR TEST, POSITIVE RESULT: Primary | ICD-10-CM

## 2021-05-10 DIAGNOSIS — R10.11 RUQ PAIN: ICD-10-CM

## 2021-05-10 PROCEDURE — 99213 OFFICE O/P EST LOW 20 MIN: CPT | Performed by: OBSTETRICS & GYNECOLOGY

## 2021-05-10 PROCEDURE — 76817 TRANSVAGINAL US OBSTETRIC: CPT | Performed by: OBSTETRICS & GYNECOLOGY

## 2021-05-20 ENCOUNTER — NURSE ONLY (OUTPATIENT)
Dept: OBGYN CLINIC | Facility: CLINIC | Age: 31
End: 2021-05-20

## 2021-05-20 ENCOUNTER — TELEPHONE (OUTPATIENT)
Dept: OBGYN CLINIC | Facility: CLINIC | Age: 31
End: 2021-05-20

## 2021-05-20 DIAGNOSIS — Z34.81 ENCOUNTER FOR SUPERVISION OF OTHER NORMAL PREGNANCY IN FIRST TRIMESTER: Primary | ICD-10-CM

## 2021-05-20 NOTE — PROGRESS NOTES
Pt is a  with EDC of 2021 based on LMP of 21. Pt voices she has in office ultrasound per JJF on 05/10/21. No Progress note noted.      Med Hx-LEEP    OB Hx-primary  section on 2017 due to oligohydramnios and fetal intolerance

## 2021-05-21 ENCOUNTER — APPOINTMENT (OUTPATIENT)
Dept: LAB | Facility: HOSPITAL | Age: 31
End: 2021-05-21
Attending: OBSTETRICS & GYNECOLOGY
Payer: COMMERCIAL

## 2021-05-21 DIAGNOSIS — Z34.81 ENCOUNTER FOR SUPERVISION OF OTHER NORMAL PREGNANCY IN FIRST TRIMESTER: ICD-10-CM

## 2021-05-21 DIAGNOSIS — E55.9 VITAMIN D INSUFFICIENCY: ICD-10-CM

## 2021-05-21 PROCEDURE — 86780 TREPONEMA PALLIDUM: CPT

## 2021-05-21 PROCEDURE — 82306 VITAMIN D 25 HYDROXY: CPT

## 2021-05-21 PROCEDURE — 85025 COMPLETE CBC W/AUTO DIFF WBC: CPT

## 2021-05-21 PROCEDURE — 86762 RUBELLA ANTIBODY: CPT

## 2021-05-21 PROCEDURE — 87086 URINE CULTURE/COLONY COUNT: CPT

## 2021-05-21 PROCEDURE — 86901 BLOOD TYPING SEROLOGIC RH(D): CPT

## 2021-05-21 PROCEDURE — 87340 HEPATITIS B SURFACE AG IA: CPT

## 2021-05-21 PROCEDURE — 87389 HIV-1 AG W/HIV-1&-2 AB AG IA: CPT

## 2021-05-21 PROCEDURE — 86900 BLOOD TYPING SEROLOGIC ABO: CPT

## 2021-05-21 PROCEDURE — 86850 RBC ANTIBODY SCREEN: CPT

## 2021-05-21 PROCEDURE — 36415 COLL VENOUS BLD VENIPUNCTURE: CPT

## 2021-05-22 NOTE — PROGRESS NOTES
[facility], [facility]  Obstetrics and Gynecology  Ultrasound Visit  Late Entry  Shawn Hudson MD    HPI     [address] is a 32year old  with Patient's last menstrual period was 2021 (exact date).  who presents for ultrasound to [facility] file      Highest education level: Not on file    Occupational History      Not on file    Tobacco Use      Smoking status: Never Smoker      Smokeless tobacco: Never Used    Vaping Use      Vaping Use: Never used    Substance and Sexual Activity      [place] year old female  with viable IUP at 7 1/7 week c/w LMP dating. Will use LMP dating.      (Z32.01) Pregnancy examination or test, positive result  (primary encounter diagnosis)      Plan   Discussed signs/symptoms of early pregnancy.   Discussed diet

## 2021-06-01 ENCOUNTER — INITIAL PRENATAL (OUTPATIENT)
Dept: OBGYN CLINIC | Facility: CLINIC | Age: 31
End: 2021-06-01
Payer: COMMERCIAL

## 2021-06-01 VITALS — SYSTOLIC BLOOD PRESSURE: 122 MMHG | WEIGHT: 184 LBS | BODY MASS INDEX: 33 KG/M2 | DIASTOLIC BLOOD PRESSURE: 78 MMHG

## 2021-06-01 DIAGNOSIS — N39.3 URINARY, INCONTINENCE, STRESS FEMALE: ICD-10-CM

## 2021-06-01 DIAGNOSIS — Z34.92 NORMAL PREGNANCY IN SECOND TRIMESTER: Primary | ICD-10-CM

## 2021-06-01 DIAGNOSIS — O34.219 PREVIOUS CESAREAN DELIVERY, ANTEPARTUM CONDITION OR COMPLICATION: ICD-10-CM

## 2021-06-01 PROCEDURE — 3078F DIAST BP <80 MM HG: CPT | Performed by: OBSTETRICS & GYNECOLOGY

## 2021-06-01 PROCEDURE — 3074F SYST BP LT 130 MM HG: CPT | Performed by: OBSTETRICS & GYNECOLOGY

## 2021-06-01 PROCEDURE — 81002 URINALYSIS NONAUTO W/O SCOPE: CPT | Performed by: OBSTETRICS & GYNECOLOGY

## 2021-06-01 NOTE — PROGRESS NOTES
[facility], [facility]  Obstetrics and Gynecology  Prenatal Visit  Abelino Woodard MD    HPI   Anastasia Echols is a 32year old.o. [facility]  female with Patient's last menstrual period was 03/06/2021 (exact date).   and with an estimated date of delivery o to 02/20/18        Quad - Trisomy 18 screen Risk Estimate - prior to 02/20/18        AFP Spina Bifida (Required questions in OE to answer )        QUAD/AFP - Interpretation        Free Fetal DNA         Genetic testing        Genetic testing        Genetic • COVID-19      Past Surgical History     Past Surgical History:   Procedure Laterality Date   •      • LEEP       Allergies   No Known Allergies  Medications     Current Outpatient Medications   Medication Sig Dispense Refill   • Prenatal 27-0. Sexually Abused:   Exam   /78   Wt 184 lb (83.5 kg)   LMP 03/06/2021 (Exact Date)   BMI 32.59 kg/m²   GENERAL: well developed, well nourished, in no apparent distress  SKIN: no rashes, no suspicious lesions  HEENT: normal  NECK: supple; no thyroidmeg

## 2021-06-03 NOTE — PROGRESS NOTES
Outpatient Maternal-Fetal Medicine Consultation    Dear Dr. Russ Quiles,    Thank you for requesting ultrasound evaluation and maternal fetal medicine consultation on your patient Jayda Smith.   As you are aware she is a 32year old female with a Si which I interpreted the results and reviewed them with the patient. Single IUP with cardiac activity 163 bpm  Amniotic fluid is normal.  Placenta location is posterior  The CRL is consistent with gestational age. Nasal bone present.  The nuchal transluce associated with a small increase in the absolute rate of some congenital anomalies (primarily neural tube defect and cardiac), and the risk may increase with increasing maternal weight. Level II ultrasound is advised for women with obesity.   The risk of n association between increased NT with aneuploidy but also structural malformations such as cardiac and skeletal defects.   When further studies are performed in evaluation of a high risk first trimester screen in young women, between 17-20% will have anothe or less    Thank you for allowing me to participate in the care of your patient. Please do not hesitate to contact me if additional questions or concerns arise. Corby Echols M.D.     The majority of the time (>50%) was spent in review of records, consu

## 2021-06-10 ENCOUNTER — HOSPITAL ENCOUNTER (OUTPATIENT)
Dept: PERINATAL CARE | Facility: HOSPITAL | Age: 31
Discharge: HOME | End: 2021-06-10
Attending: OBSTETRICS & GYNECOLOGY
Payer: COMMERCIAL

## 2021-06-10 VITALS
SYSTOLIC BLOOD PRESSURE: 109 MMHG | WEIGHT: 184 LBS | HEART RATE: 104 BPM | BODY MASS INDEX: 33 KG/M2 | DIASTOLIC BLOOD PRESSURE: 67 MMHG

## 2021-06-10 DIAGNOSIS — O99.211 OBESITY AFFECTING PREGNANCY IN FIRST TRIMESTER: ICD-10-CM

## 2021-06-10 DIAGNOSIS — Z36.9 FIRST TRIMESTER SCREENING: ICD-10-CM

## 2021-06-10 DIAGNOSIS — O34.219 PREVIOUS CESAREAN DELIVERY, ANTEPARTUM CONDITION OR COMPLICATION: ICD-10-CM

## 2021-06-10 DIAGNOSIS — Z36.9 FIRST TRIMESTER SCREENING: Primary | ICD-10-CM

## 2021-06-10 PROCEDURE — 76813 OB US NUCHAL MEAS 1 GEST: CPT | Performed by: OBSTETRICS & GYNECOLOGY

## 2021-06-10 PROCEDURE — 99243 OFF/OP CNSLTJ NEW/EST LOW 30: CPT | Performed by: OBSTETRICS & GYNECOLOGY

## 2021-06-14 ENCOUNTER — TELEPHONE (OUTPATIENT)
Dept: PERINATAL CARE | Facility: HOSPITAL | Age: 31
End: 2021-06-14

## 2021-06-14 NOTE — TELEPHONE ENCOUNTER
1st trimester screen results   were reviewed by Luis Sebastian  deemed  Low risk for trisomy 18/13/21  Copy of report sent for scanning  My chart message sent

## 2021-06-17 ENCOUNTER — HOSPITAL ENCOUNTER (OUTPATIENT)
Dept: ULTRASOUND IMAGING | Age: 31
Discharge: HOME | End: 2021-06-17
Attending: OBSTETRICS & GYNECOLOGY
Payer: COMMERCIAL

## 2021-06-17 ENCOUNTER — TELEPHONE (OUTPATIENT)
Dept: OBGYN CLINIC | Facility: CLINIC | Age: 31
End: 2021-06-17

## 2021-06-17 DIAGNOSIS — R10.11 RUQ PAIN: ICD-10-CM

## 2021-06-17 PROCEDURE — 76705 ECHO EXAM OF ABDOMEN: CPT | Performed by: OBSTETRICS & GYNECOLOGY

## 2021-06-17 NOTE — TELEPHONE ENCOUNTER
Pt called and informed of results and recommendations. Pt voices understanding.    ----- Message from Rex Carreno MD sent at 6/17/2021 11:29 AM CDT -----  Result noted.   Please notify patient that her right upper quadrant ultrasound showed evidence o

## 2021-07-20 ENCOUNTER — ROUTINE PRENATAL (OUTPATIENT)
Dept: OBGYN CLINIC | Facility: CLINIC | Age: 31
End: 2021-07-20
Payer: COMMERCIAL

## 2021-07-20 VITALS — BODY MASS INDEX: 33 KG/M2 | WEIGHT: 188.63 LBS | DIASTOLIC BLOOD PRESSURE: 67 MMHG | SYSTOLIC BLOOD PRESSURE: 104 MMHG

## 2021-07-20 DIAGNOSIS — O99.210 OBESITY IN PREGNANCY: ICD-10-CM

## 2021-07-20 DIAGNOSIS — Z34.82 ENCOUNTER FOR SUPERVISION OF OTHER NORMAL PREGNANCY IN SECOND TRIMESTER: Primary | ICD-10-CM

## 2021-07-20 DIAGNOSIS — K80.20 CHOLELITHIASIS AFFECTING PREGNANCY, ANTEPARTUM: ICD-10-CM

## 2021-07-20 DIAGNOSIS — O26.619 CHOLELITHIASIS AFFECTING PREGNANCY, ANTEPARTUM: ICD-10-CM

## 2021-07-20 LAB
APPEARANCE: CLEAR
BILIRUBIN: NEGATIVE
GLUCOSE (URINE DIPSTICK): NEGATIVE MG/DL
KETONES (URINE DIPSTICK): NEGATIVE MG/DL
LEUKOCYTES: NEGATIVE
MULTISTIX LOT#: 5077 NUMERIC
NITRITE, URINE: NEGATIVE
OCCULT BLOOD: NEGATIVE
PH, URINE: 7.5 (ref 4.5–8)
PROTEIN (URINE DIPSTICK): NEGATIVE MG/DL
SPECIFIC GRAVITY: 1.02 (ref 1–1.03)
URINE-COLOR: YELLOW
UROBILINOGEN,SEMI-QN: 0.2 MG/DL (ref 0–1.9)

## 2021-07-20 PROCEDURE — 3078F DIAST BP <80 MM HG: CPT | Performed by: OBSTETRICS & GYNECOLOGY

## 2021-07-20 PROCEDURE — 3074F SYST BP LT 130 MM HG: CPT | Performed by: OBSTETRICS & GYNECOLOGY

## 2021-07-20 PROCEDURE — 81003 URINALYSIS AUTO W/O SCOPE: CPT | Performed by: OBSTETRICS & GYNECOLOGY

## 2021-07-20 NOTE — PROGRESS NOTES
[facility], [facility]  Obstetrics and Gynecology  Prenatal Visit  Anabelle Abdul MD    HPI   [address] is a 32year old.o.  19w3d weeks. Patient denies any significant cramping or bleeding.   Patient's symptoms from right upper quadrant cu

## 2021-07-28 ENCOUNTER — HOSPITAL ENCOUNTER (OUTPATIENT)
Dept: PERINATAL CARE | Facility: HOSPITAL | Age: 31
Discharge: HOME | End: 2021-07-28
Attending: OBSTETRICS & GYNECOLOGY
Payer: COMMERCIAL

## 2021-07-28 VITALS
WEIGHT: 188 LBS | BODY MASS INDEX: 33 KG/M2 | SYSTOLIC BLOOD PRESSURE: 113 MMHG | HEART RATE: 95 BPM | DIASTOLIC BLOOD PRESSURE: 58 MMHG

## 2021-07-28 DIAGNOSIS — O99.212 OBESITY AFFECTING PREGNANCY IN SECOND TRIMESTER: Primary | ICD-10-CM

## 2021-07-28 DIAGNOSIS — O99.212 OBESITY AFFECTING PREGNANCY IN SECOND TRIMESTER: ICD-10-CM

## 2021-07-28 DIAGNOSIS — Z36.3 SCREENING, ANTENATAL, FOR MALFORMATION BY ULTRASOUND: ICD-10-CM

## 2021-07-28 DIAGNOSIS — O34.219 PREVIOUS CESAREAN DELIVERY, ANTEPARTUM CONDITION OR COMPLICATION: ICD-10-CM

## 2021-07-28 PROCEDURE — 76811 OB US DETAILED SNGL FETUS: CPT | Performed by: OBSTETRICS & GYNECOLOGY

## 2021-07-28 PROCEDURE — 99214 OFFICE O/P EST MOD 30 MIN: CPT | Performed by: OBSTETRICS & GYNECOLOGY

## 2021-07-28 NOTE — PROGRESS NOTES
Outpatient Maternal-Fetal Medicine Consultation    Dear Dr. Debbie Sage,    Thank you for requesting ultrasound evaluation and maternal fetal medicine consultation on your patient Angella Wren.   As you are aware she is a 32year old female with a Si issues:  OBESITY:  Her BMI prior to pregnancy was 32  Obesity during pregnancy is associated with numerous maternal and  risks.   It is not clear whether obesity is a direct cause of adverse pregnancy outcome or whether the association between obes intracardiac foci absent. Nasal bone present. Choroid plexus cyst absent. Summary of Ultrasound findings: This is a [place] pregnancy    The fetal measurements are consistent with established EDC.  No gross ultrasound evidence of structural abnormal

## 2021-08-23 ENCOUNTER — ROUTINE PRENATAL (OUTPATIENT)
Dept: OBGYN CLINIC | Facility: CLINIC | Age: 31
End: 2021-08-23
Payer: COMMERCIAL

## 2021-08-23 VITALS — DIASTOLIC BLOOD PRESSURE: 76 MMHG | WEIGHT: 195.19 LBS | SYSTOLIC BLOOD PRESSURE: 108 MMHG | BODY MASS INDEX: 35 KG/M2

## 2021-08-23 DIAGNOSIS — Z34.82 ENCOUNTER FOR SUPERVISION OF OTHER NORMAL PREGNANCY IN SECOND TRIMESTER: Primary | ICD-10-CM

## 2021-08-23 LAB
BILIRUBIN: NEGATIVE
GLUCOSE (URINE DIPSTICK): NEGATIVE MG/DL
KETONES (URINE DIPSTICK): NEGATIVE MG/DL
LEUKOCYTES: NEGATIVE
MULTISTIX LOT#: 5077 NUMERIC
NITRITE, URINE: NEGATIVE
OCCULT BLOOD: NEGATIVE
PH, URINE: 6.5 (ref 4.5–8)
PROTEIN (URINE DIPSTICK): NEGATIVE MG/DL
SPECIFIC GRAVITY: 1 (ref 1–1.03)
URINE-COLOR: YELLOW
UROBILINOGEN,SEMI-QN: 0.2 MG/DL (ref 0–1.9)

## 2021-08-23 PROCEDURE — 81002 URINALYSIS NONAUTO W/O SCOPE: CPT | Performed by: OBSTETRICS & GYNECOLOGY

## 2021-08-23 PROCEDURE — 3078F DIAST BP <80 MM HG: CPT | Performed by: OBSTETRICS & GYNECOLOGY

## 2021-08-23 PROCEDURE — 3074F SYST BP LT 130 MM HG: CPT | Performed by: OBSTETRICS & GYNECOLOGY

## 2021-09-07 ENCOUNTER — TELEPHONE (OUTPATIENT)
Dept: OBGYN CLINIC | Facility: CLINIC | Age: 31
End: 2021-09-07

## 2021-09-07 DIAGNOSIS — Z34.82 ENCOUNTER FOR SUPERVISION OF OTHER NORMAL PREGNANCY IN SECOND TRIMESTER: Primary | ICD-10-CM

## 2021-09-07 NOTE — TELEPHONE ENCOUNTER
OB History     T1    L1    SAB0  TAB0  Ectopic0  Multiple0  Live Births1   26w3d    Patient name and  verified. Patient asking for order of 28 week labs. Order placed for patient to complete. Central scheduling number give.

## 2021-09-07 NOTE — TELEPHONE ENCOUNTER
Pt requesting info for blood and glucose testing. When should she have it done and when will orders be placed.     Please advise

## 2021-09-17 ENCOUNTER — LAB ENCOUNTER (OUTPATIENT)
Dept: LAB | Facility: HOSPITAL | Age: 31
End: 2021-09-17
Attending: OBSTETRICS & GYNECOLOGY
Payer: COMMERCIAL

## 2021-09-17 DIAGNOSIS — Z34.82 ENCOUNTER FOR SUPERVISION OF OTHER NORMAL PREGNANCY IN SECOND TRIMESTER: ICD-10-CM

## 2021-09-17 LAB
BASOPHILS # BLD AUTO: 0.04 X10(3) UL (ref 0–0.2)
BASOPHILS NFR BLD AUTO: 0.5 %
DEPRECATED RDW RBC AUTO: 41.3 FL (ref 35.1–46.3)
EOSINOPHIL # BLD AUTO: 0.14 X10(3) UL (ref 0–0.7)
EOSINOPHIL NFR BLD AUTO: 1.8 %
ERYTHROCYTE [DISTWIDTH] IN BLOOD BY AUTOMATED COUNT: 12.6 % (ref 11–15)
GLUCOSE 1H P GLC SERPL-MCNC: 130 MG/DL
HCT VFR BLD AUTO: 34 %
HGB BLD-MCNC: 11.6 G/DL
IMM GRANULOCYTES # BLD AUTO: 0.1 X10(3) UL (ref 0–1)
IMM GRANULOCYTES NFR BLD: 1.3 %
LYMPHOCYTES # BLD AUTO: 1.64 X10(3) UL (ref 1–4)
LYMPHOCYTES NFR BLD AUTO: 20.5 %
MCH RBC QN AUTO: 30.9 PG (ref 26–34)
MCHC RBC AUTO-ENTMCNC: 34.1 G/DL (ref 31–37)
MCV RBC AUTO: 90.4 FL
MONOCYTES # BLD AUTO: 0.4 X10(3) UL (ref 0.1–1)
MONOCYTES NFR BLD AUTO: 5 %
NEUTROPHILS # BLD AUTO: 5.67 X10 (3) UL (ref 1.5–7.7)
NEUTROPHILS # BLD AUTO: 5.67 X10(3) UL (ref 1.5–7.7)
NEUTROPHILS NFR BLD AUTO: 70.9 %
PLATELET # BLD AUTO: 226 10(3)UL (ref 150–450)
RBC # BLD AUTO: 3.76 X10(6)UL
WBC # BLD AUTO: 8 X10(3) UL (ref 4–11)

## 2021-09-17 PROCEDURE — 85025 COMPLETE CBC W/AUTO DIFF WBC: CPT

## 2021-09-17 PROCEDURE — 82950 GLUCOSE TEST: CPT

## 2021-09-17 PROCEDURE — 36415 COLL VENOUS BLD VENIPUNCTURE: CPT

## 2021-09-21 ENCOUNTER — ROUTINE PRENATAL (OUTPATIENT)
Dept: OBGYN CLINIC | Facility: CLINIC | Age: 31
End: 2021-09-21
Payer: COMMERCIAL

## 2021-09-21 VITALS — SYSTOLIC BLOOD PRESSURE: 111 MMHG | BODY MASS INDEX: 35 KG/M2 | DIASTOLIC BLOOD PRESSURE: 69 MMHG | WEIGHT: 199.19 LBS

## 2021-09-21 DIAGNOSIS — Z34.83 ENCOUNTER FOR SUPERVISION OF OTHER NORMAL PREGNANCY IN THIRD TRIMESTER: Primary | ICD-10-CM

## 2021-09-21 LAB
APPEARANCE: CLEAR
BILIRUBIN: NEGATIVE
GLUCOSE (URINE DIPSTICK): NEGATIVE MG/DL
KETONES (URINE DIPSTICK): NEGATIVE MG/DL
LEUKOCYTES: NEGATIVE
MULTISTIX LOT#: 5077 NUMERIC
NITRITE, URINE: NEGATIVE
OCCULT BLOOD: NEGATIVE
PH, URINE: 6.5 (ref 4.5–8)
PROTEIN (URINE DIPSTICK): NEGATIVE MG/DL
SPECIFIC GRAVITY: 1.01 (ref 1–1.03)
URINE-COLOR: YELLOW
UROBILINOGEN,SEMI-QN: 0.2 MG/DL (ref 0–1.9)

## 2021-09-21 PROCEDURE — 81002 URINALYSIS NONAUTO W/O SCOPE: CPT | Performed by: OBSTETRICS & GYNECOLOGY

## 2021-09-21 PROCEDURE — 3078F DIAST BP <80 MM HG: CPT | Performed by: OBSTETRICS & GYNECOLOGY

## 2021-09-21 PROCEDURE — 3074F SYST BP LT 130 MM HG: CPT | Performed by: OBSTETRICS & GYNECOLOGY

## 2021-09-21 NOTE — PROGRESS NOTES
[facility], [facility]  Obstetrics and Gynecology  Prenatal Visit  Iwona Escobedo MD    HPI   [address] is a 32year old.o.  28w3d weeks. Here for routine prenatal visit and is without complaints.   Patient denies any regular uterine contra surgery. Pt counseled on risks of repeat  including infection, bleeding, transfusion and damage to other organs-bowel, bladder, blood vessels etc.  Pt understands all of the above and will consider her options.   We will try to attempt to obtain pa

## 2021-10-05 ENCOUNTER — ROUTINE PRENATAL (OUTPATIENT)
Dept: OBGYN CLINIC | Facility: CLINIC | Age: 31
End: 2021-10-05
Payer: COMMERCIAL

## 2021-10-05 VITALS — BODY MASS INDEX: 35 KG/M2 | DIASTOLIC BLOOD PRESSURE: 70 MMHG | WEIGHT: 200 LBS | SYSTOLIC BLOOD PRESSURE: 120 MMHG

## 2021-10-05 DIAGNOSIS — Z34.83 ENCOUNTER FOR SUPERVISION OF OTHER NORMAL PREGNANCY IN THIRD TRIMESTER: Primary | ICD-10-CM

## 2021-10-05 DIAGNOSIS — R73.09 ELEVATED GLUCOSE TOLERANCE TEST: ICD-10-CM

## 2021-10-05 PROBLEM — O99.810 PREGNANCY-INDUCED GLUCOSE INTOLERANCE (HCC): Status: ACTIVE | Noted: 2021-10-05

## 2021-10-05 PROBLEM — O99.810 PREGNANCY-INDUCED GLUCOSE INTOLERANCE: Status: ACTIVE | Noted: 2021-10-05

## 2021-10-05 PROCEDURE — 3078F DIAST BP <80 MM HG: CPT | Performed by: NURSE PRACTITIONER

## 2021-10-05 PROCEDURE — 81002 URINALYSIS NONAUTO W/O SCOPE: CPT | Performed by: NURSE PRACTITIONER

## 2021-10-05 PROCEDURE — 90471 IMMUNIZATION ADMIN: CPT | Performed by: NURSE PRACTITIONER

## 2021-10-05 PROCEDURE — 3074F SYST BP LT 130 MM HG: CPT | Performed by: NURSE PRACTITIONER

## 2021-10-05 PROCEDURE — 90715 TDAP VACCINE 7 YRS/> IM: CPT | Performed by: NURSE PRACTITIONER

## 2021-10-05 RX ORDER — BREAST PUMP
EACH MISCELLANEOUS
Qty: 1 EACH | Refills: 0 | OUTPATIENT
Start: 2021-10-05 | End: 2022-01-04

## 2021-10-05 NOTE — PROGRESS NOTES
[facility]  Obstetrics and Gynecology  28 Week Prenatal Visit  Reji Banks, MSN, APRN, FNP-BC      HPI   Anastasia Echols is a 32year old.  30w3d weeks.  STEPHANIE 2021    Patient states that she has lower extremity swelling and that c BMI 35.43 kg/m²     See flow sheet for FHT's and fundal assessment.     Osmin Almonte is a 32year old female  with viable IUP at 30w3d weeks.    (Z34.83) Encounter for supervision of other normal pregnancy in third trimester  (primary encoun verbalizes understanding.       Abi Dowell, APRN  5:47 PM  10/5/2021

## 2021-10-06 ENCOUNTER — TELEPHONE (OUTPATIENT)
Dept: OBGYN CLINIC | Facility: CLINIC | Age: 31
End: 2021-10-06

## 2021-10-06 DIAGNOSIS — O99.213 OBESITY AFFECTING PREGNANCY IN THIRD TRIMESTER: Primary | ICD-10-CM

## 2021-10-07 ENCOUNTER — LABORATORY ENCOUNTER (OUTPATIENT)
Dept: LAB | Facility: HOSPITAL | Age: 31
End: 2021-10-07
Attending: NURSE PRACTITIONER
Payer: COMMERCIAL

## 2021-10-07 DIAGNOSIS — R73.09 ELEVATED GLUCOSE TOLERANCE TEST: ICD-10-CM

## 2021-10-07 DIAGNOSIS — Z34.83 ENCOUNTER FOR SUPERVISION OF OTHER NORMAL PREGNANCY IN THIRD TRIMESTER: ICD-10-CM

## 2021-10-07 PROCEDURE — 36415 COLL VENOUS BLD VENIPUNCTURE: CPT

## 2021-10-07 PROCEDURE — 82952 GTT-ADDED SAMPLES: CPT

## 2021-10-07 PROCEDURE — 82951 GLUCOSE TOLERANCE TEST (GTT): CPT

## 2021-10-19 ENCOUNTER — HOSPITAL ENCOUNTER (OUTPATIENT)
Dept: PERINATAL CARE | Facility: HOSPITAL | Age: 31
Discharge: HOME | End: 2021-10-19
Attending: OBSTETRICS & GYNECOLOGY
Payer: COMMERCIAL

## 2021-10-19 VITALS
HEART RATE: 120 BPM | SYSTOLIC BLOOD PRESSURE: 117 MMHG | WEIGHT: 203 LBS | DIASTOLIC BLOOD PRESSURE: 67 MMHG | BODY MASS INDEX: 36 KG/M2

## 2021-10-19 DIAGNOSIS — E66.9 CLASS 1 OBESITY WITHOUT SERIOUS COMORBIDITY WITH BODY MASS INDEX (BMI) OF 32.0 TO 32.9 IN ADULT: Primary | ICD-10-CM

## 2021-10-19 DIAGNOSIS — E66.9 CLASS 1 OBESITY WITHOUT SERIOUS COMORBIDITY WITH BODY MASS INDEX (BMI) OF 32.0 TO 32.9 IN ADULT: ICD-10-CM

## 2021-10-19 DIAGNOSIS — O99.810 PREGNANCY-INDUCED GLUCOSE INTOLERANCE: ICD-10-CM

## 2021-10-19 DIAGNOSIS — E66.9 CLASS 1 OBESITY WITHOUT SERIOUS COMORBIDITY WITH BODY MASS INDEX (BMI) OF 32.0 TO 32.9 IN ADULT, UNSPECIFIED OBESITY TYPE: ICD-10-CM

## 2021-10-19 DIAGNOSIS — O34.219 PREVIOUS CESAREAN DELIVERY, ANTEPARTUM CONDITION OR COMPLICATION: ICD-10-CM

## 2021-10-19 PROCEDURE — 76816 OB US FOLLOW-UP PER FETUS: CPT | Performed by: OBSTETRICS & GYNECOLOGY

## 2021-10-19 PROCEDURE — 76819 FETAL BIOPHYS PROFIL W/O NST: CPT | Performed by: OBSTETRICS & GYNECOLOGY

## 2021-10-19 PROCEDURE — 99213 OFFICE O/P EST LOW 20 MIN: CPT | Performed by: OBSTETRICS & GYNECOLOGY

## 2021-10-19 NOTE — PROGRESS NOTES
Outpatient Maternal-Fetal Medicine Consultation    Dear Dr. Filomena Toribio,    Thank you for requesting ultrasound evaluation and maternal fetal medicine consultation on your patient Klaeb Mcgarry.   As you are aware she is a 32year old female with a Si soft, non-tender  Extremities: non-tender, no edema        DISCUSSION  During her visit we discussed and reviewed the following issues:  OBESITY:  Her BMI prior to pregnancy was 32  Obesity during pregnancy is associated with numerous maternal and perinata face-to-face time was 20 minutes.

## 2021-10-21 ENCOUNTER — ROUTINE PRENATAL (OUTPATIENT)
Dept: OBGYN CLINIC | Facility: CLINIC | Age: 31
End: 2021-10-21
Payer: COMMERCIAL

## 2021-10-21 VITALS — DIASTOLIC BLOOD PRESSURE: 64 MMHG | WEIGHT: 202 LBS | SYSTOLIC BLOOD PRESSURE: 118 MMHG | BODY MASS INDEX: 36 KG/M2

## 2021-10-21 DIAGNOSIS — Z34.83 ENCOUNTER FOR SUPERVISION OF OTHER NORMAL PREGNANCY IN THIRD TRIMESTER: Primary | ICD-10-CM

## 2021-10-21 PROCEDURE — 3074F SYST BP LT 130 MM HG: CPT | Performed by: OBSTETRICS & GYNECOLOGY

## 2021-10-21 PROCEDURE — 81002 URINALYSIS NONAUTO W/O SCOPE: CPT | Performed by: OBSTETRICS & GYNECOLOGY

## 2021-10-21 PROCEDURE — 3078F DIAST BP <80 MM HG: CPT | Performed by: OBSTETRICS & GYNECOLOGY

## 2021-10-21 NOTE — PROGRESS NOTES
No complaints. Good fetal movements. Previous  section for fetal intolerance to labor, oligohydramnios, induced, recurrent variable decelerations that did not resolve with amnioinfusion. Patient has not read or signed the  consent forms.  These

## 2021-10-29 ENCOUNTER — LAB ENCOUNTER (OUTPATIENT)
Dept: LAB | Facility: HOSPITAL | Age: 31
End: 2021-10-29
Attending: OBSTETRICS & GYNECOLOGY
Payer: COMMERCIAL

## 2021-10-29 DIAGNOSIS — Z34.83 ENCOUNTER FOR SUPERVISION OF OTHER NORMAL PREGNANCY IN THIRD TRIMESTER: ICD-10-CM

## 2021-10-29 PROCEDURE — 87389 HIV-1 AG W/HIV-1&-2 AB AG IA: CPT

## 2021-10-29 PROCEDURE — 36415 COLL VENOUS BLD VENIPUNCTURE: CPT

## 2021-10-29 PROCEDURE — 85025 COMPLETE CBC W/AUTO DIFF WBC: CPT

## 2021-10-29 PROCEDURE — 86780 TREPONEMA PALLIDUM: CPT

## 2021-11-04 ENCOUNTER — TELEPHONE (OUTPATIENT)
Dept: OBGYN CLINIC | Facility: CLINIC | Age: 31
End: 2021-11-04

## 2021-11-04 ENCOUNTER — NST DOCUMENTATION (OUTPATIENT)
Dept: OBGYN CLINIC | Facility: CLINIC | Age: 31
End: 2021-11-04

## 2021-11-04 ENCOUNTER — PATIENT MESSAGE (OUTPATIENT)
Dept: OBGYN CLINIC | Facility: CLINIC | Age: 31
End: 2021-11-04

## 2021-11-04 ENCOUNTER — HOSPITAL ENCOUNTER (OUTPATIENT)
Facility: HOSPITAL | Age: 31
Setting detail: OBSERVATION
Discharge: HOME | End: 2021-11-04
Attending: OBSTETRICS & GYNECOLOGY | Admitting: OBSTETRICS & GYNECOLOGY
Payer: COMMERCIAL

## 2021-11-04 VITALS — HEART RATE: 87 BPM | SYSTOLIC BLOOD PRESSURE: 111 MMHG | DIASTOLIC BLOOD PRESSURE: 68 MMHG

## 2021-11-04 DIAGNOSIS — R11.0 PREGNANCY RELATED NAUSEA, ANTEPARTUM: ICD-10-CM

## 2021-11-04 DIAGNOSIS — O26.899 PREGNANCY RELATED NAUSEA, ANTEPARTUM: ICD-10-CM

## 2021-11-04 PROCEDURE — 85025 COMPLETE CBC W/AUTO DIFF WBC: CPT | Performed by: OBSTETRICS & GYNECOLOGY

## 2021-11-04 PROCEDURE — 81001 URINALYSIS AUTO W/SCOPE: CPT | Performed by: OBSTETRICS & GYNECOLOGY

## 2021-11-04 PROCEDURE — 59025 FETAL NON-STRESS TEST: CPT | Performed by: OBSTETRICS & GYNECOLOGY

## 2021-11-04 PROCEDURE — 80053 COMPREHEN METABOLIC PANEL: CPT | Performed by: OBSTETRICS & GYNECOLOGY

## 2021-11-04 PROCEDURE — 36415 COLL VENOUS BLD VENIPUNCTURE: CPT

## 2021-11-04 NOTE — PROGRESS NOTES
Pt is a 32year old female admitted to TR5/TR5-A. Patient presents with:  R/o Pih: pt. with c/o feeling light headeness and nausea since yesterday at approximately 1230.  pt.previous c/s     Pt is  34w5d intra-uterine pregnancy.   History obtained,

## 2021-11-04 NOTE — TELEPHONE ENCOUNTER
Pt. states that she is 35 weeks and is not feeling well, she is having headaches, feels light headed and nausea.

## 2021-11-04 NOTE — TELEPHONE ENCOUNTER
Since yesterday, pt voices she has been feeling fatigued and light headed. Pt voices her hands and feet are also swollen and she has been having a dull frontal headache. Pt took her BP today and voices her systolic BP was 791. Advised pt to go to the [facility] ER for evaluation. Pt voices understanding. Talked to the [facility] triage RN and report given.

## 2021-11-05 ENCOUNTER — TELEPHONE (OUTPATIENT)
Dept: OBGYN CLINIC | Facility: CLINIC | Age: 31
End: 2021-11-05

## 2021-11-05 NOTE — NST
Nonstress Test   Patient: Cynthia Estimable    Gestation: 34w5d    Diagnosis from order:         NST:         NST DOCUMENTATION 11/4/2021   Variability 6-25 BPM   Accelerations Yes   Decelerations None   Baseline 125   Uterine Irritability No   Cont Glucose Urine Negative Negative mg/dL    Bilirubin Urine Negative Negative    Ketones Urine Negative Negative mg/dL    Blood Urine Negative Negative    pH Urine 7.0 5.0 - 8.0    Protein Urine Negative Negative mg/dL    Urobilinogen Urine <2.0 <2.0    Nitri

## 2021-11-05 NOTE — TELEPHONE ENCOUNTER
----- Message from Darron Bain MD sent at 11/5/2021 11:35 AM CDT -----  Regarding: FW: Work and Follow-up       ----- Message -----  From: Jillian Kumari RN  Sent: 11/5/2021  10:04 AM CDT  To: Darron Bain MD  Subject: Max Thompson: Work and Follow-up                               ----- Message -----  From: Yuval Rinaldi  Sent: 11/5/2021   9:47 AM CDT  To: Veronica [facility] Ob/Gyne Clinical Staff  Subject: Work and Follow-up                               Nov 4th 2021, thank you.

## 2021-11-05 NOTE — TELEPHONE ENCOUNTER
Approved by Provider. Patient missed her appointment with me. She needs to come in today or tomorrow for a Prenatal visit with one of the doctors.

## 2021-11-09 ENCOUNTER — ROUTINE PRENATAL (OUTPATIENT)
Dept: OBGYN CLINIC | Facility: CLINIC | Age: 31
End: 2021-11-09
Payer: COMMERCIAL

## 2021-11-09 VITALS
HEART RATE: 109 BPM | DIASTOLIC BLOOD PRESSURE: 73 MMHG | BODY MASS INDEX: 36 KG/M2 | WEIGHT: 204.81 LBS | SYSTOLIC BLOOD PRESSURE: 116 MMHG

## 2021-11-09 DIAGNOSIS — Z34.83 ENCOUNTER FOR SUPERVISION OF OTHER NORMAL PREGNANCY IN THIRD TRIMESTER: Primary | ICD-10-CM

## 2021-11-09 PROCEDURE — 81002 URINALYSIS NONAUTO W/O SCOPE: CPT | Performed by: OBSTETRICS & GYNECOLOGY

## 2021-11-09 PROCEDURE — 3078F DIAST BP <80 MM HG: CPT | Performed by: OBSTETRICS & GYNECOLOGY

## 2021-11-09 PROCEDURE — 3074F SYST BP LT 130 MM HG: CPT | Performed by: OBSTETRICS & GYNECOLOGY

## 2021-11-09 NOTE — PROGRESS NOTES
No C/Os. Desires VTOL. Had previous  with Dr Joaquin Perrin. No Op-Report in chart. Patient to sign HUI form so we can get Op-Report from his office. Need to confirm if had LTCS and wether it was a one or two layer closure of hysterotomy.   Patient ex

## 2021-11-11 NOTE — PROGRESS NOTES
Isaias Vilchis     Dear Dr. Rainer Castillo,     Thank you for requesting ultrasound evaluation and maternal fetal medicine consultation on your patient Cornelio Soria.   As you are aware she is a 32year old female  wi with numerous maternal and  risks which were discussed previously. See [facility] note from 6/10/2021 for detailed review     H/o  -she was induced at 39+ weeks for oligohydramnios.   She was in labor there were variable heart rate decelerations

## 2021-11-18 ENCOUNTER — HOSPITAL ENCOUNTER (OUTPATIENT)
Dept: PERINATAL CARE | Facility: HOSPITAL | Age: 31
Discharge: HOME | End: 2021-11-18
Attending: OBSTETRICS & GYNECOLOGY
Payer: COMMERCIAL

## 2021-11-18 VITALS
SYSTOLIC BLOOD PRESSURE: 115 MMHG | HEART RATE: 106 BPM | DIASTOLIC BLOOD PRESSURE: 57 MMHG | BODY MASS INDEX: 36 KG/M2 | WEIGHT: 204 LBS

## 2021-11-18 DIAGNOSIS — O99.213 OBESITY AFFECTING PREGNANCY IN THIRD TRIMESTER: ICD-10-CM

## 2021-11-18 DIAGNOSIS — O99.213 OBESITY AFFECTING PREGNANCY IN THIRD TRIMESTER: Primary | ICD-10-CM

## 2021-11-18 DIAGNOSIS — O99.810 PREGNANCY-INDUCED GLUCOSE INTOLERANCE: ICD-10-CM

## 2021-11-18 DIAGNOSIS — O34.219 PREVIOUS CESAREAN DELIVERY, ANTEPARTUM CONDITION OR COMPLICATION: ICD-10-CM

## 2021-11-18 PROCEDURE — 99213 OFFICE O/P EST LOW 20 MIN: CPT | Performed by: OBSTETRICS & GYNECOLOGY

## 2021-11-18 PROCEDURE — 76816 OB US FOLLOW-UP PER FETUS: CPT | Performed by: OBSTETRICS & GYNECOLOGY

## 2021-11-18 PROCEDURE — 76819 FETAL BIOPHYS PROFIL W/O NST: CPT | Performed by: OBSTETRICS & GYNECOLOGY

## 2021-11-19 ENCOUNTER — ROUTINE PRENATAL (OUTPATIENT)
Dept: OBGYN CLINIC | Facility: CLINIC | Age: 31
End: 2021-11-19
Payer: COMMERCIAL

## 2021-11-19 VITALS — BODY MASS INDEX: 37 KG/M2 | DIASTOLIC BLOOD PRESSURE: 76 MMHG | WEIGHT: 210 LBS | SYSTOLIC BLOOD PRESSURE: 130 MMHG

## 2021-11-19 DIAGNOSIS — Z34.83 ENCOUNTER FOR SUPERVISION OF OTHER NORMAL PREGNANCY IN THIRD TRIMESTER: Primary | ICD-10-CM

## 2021-11-19 DIAGNOSIS — O34.219 PREVIOUS CESAREAN DELIVERY, ANTEPARTUM CONDITION OR COMPLICATION: ICD-10-CM

## 2021-11-19 PROCEDURE — 3078F DIAST BP <80 MM HG: CPT | Performed by: OBSTETRICS & GYNECOLOGY

## 2021-11-19 PROCEDURE — 3075F SYST BP GE 130 - 139MM HG: CPT | Performed by: OBSTETRICS & GYNECOLOGY

## 2021-11-19 PROCEDURE — 81003 URINALYSIS AUTO W/O SCOPE: CPT | Performed by: OBSTETRICS & GYNECOLOGY

## 2021-11-19 NOTE — PROGRESS NOTES
[facility], [facility]  Obstetrics and Gynecology  Prenatal Visit  Gerson Babb MD    HPI   [address] is a 32year old.o.  36w6d weeks. Here for routine prenatal visit and is without complaints.   Patient denies any regular uterine contra PM  11/19/2021

## 2021-11-22 ENCOUNTER — TELEPHONE (OUTPATIENT)
Dept: OBGYN CLINIC | Facility: CLINIC | Age: 31
End: 2021-11-22

## 2021-11-22 NOTE — TELEPHONE ENCOUNTER
FMLA Forms were received for  in [employer] Sign Release and pt did not pay but will pay when  FMLA at [address] [facility]. Pt did not sign HUI but will come in today and complete form.

## 2021-11-30 ENCOUNTER — ROUTINE PRENATAL (OUTPATIENT)
Dept: OBGYN CLINIC | Facility: CLINIC | Age: 31
End: 2021-11-30
Payer: COMMERCIAL

## 2021-11-30 VITALS — SYSTOLIC BLOOD PRESSURE: 128 MMHG | BODY MASS INDEX: 37 KG/M2 | WEIGHT: 208 LBS | DIASTOLIC BLOOD PRESSURE: 78 MMHG

## 2021-11-30 DIAGNOSIS — Z34.83 ENCOUNTER FOR SUPERVISION OF OTHER NORMAL PREGNANCY IN THIRD TRIMESTER: Primary | ICD-10-CM

## 2021-11-30 PROCEDURE — 3078F DIAST BP <80 MM HG: CPT | Performed by: OBSTETRICS & GYNECOLOGY

## 2021-11-30 PROCEDURE — 81003 URINALYSIS AUTO W/O SCOPE: CPT | Performed by: OBSTETRICS & GYNECOLOGY

## 2021-11-30 PROCEDURE — 3074F SYST BP LT 130 MM HG: CPT | Performed by: OBSTETRICS & GYNECOLOGY

## 2021-12-07 ENCOUNTER — TELEPHONE (OUTPATIENT)
Dept: OBGYN CLINIC | Facility: CLINIC | Age: 31
End: 2021-12-07

## 2021-12-07 ENCOUNTER — ROUTINE PRENATAL (OUTPATIENT)
Dept: OBGYN CLINIC | Facility: CLINIC | Age: 31
End: 2021-12-07
Payer: COMMERCIAL

## 2021-12-07 VITALS — BODY MASS INDEX: 38 KG/M2 | WEIGHT: 214.63 LBS | SYSTOLIC BLOOD PRESSURE: 122 MMHG | DIASTOLIC BLOOD PRESSURE: 76 MMHG

## 2021-12-07 DIAGNOSIS — Z34.83 ENCOUNTER FOR SUPERVISION OF OTHER NORMAL PREGNANCY IN THIRD TRIMESTER: Primary | ICD-10-CM

## 2021-12-07 DIAGNOSIS — Z01.818 PREOP TESTING: Primary | ICD-10-CM

## 2021-12-07 PROCEDURE — 3078F DIAST BP <80 MM HG: CPT | Performed by: OBSTETRICS & GYNECOLOGY

## 2021-12-07 PROCEDURE — 81002 URINALYSIS NONAUTO W/O SCOPE: CPT | Performed by: OBSTETRICS & GYNECOLOGY

## 2021-12-07 PROCEDURE — 3074F SYST BP LT 130 MM HG: CPT | Performed by: OBSTETRICS & GYNECOLOGY

## 2021-12-07 RX ORDER — MAGNESIUM OXIDE 400 MG (241.3 MG MAGNESIUM) TABLET
TABLET
COMMUNITY
Start: 2021-09-01 | End: 2021-12-17

## 2021-12-07 NOTE — PROGRESS NOTES
Milo adams,  [place] ct advised. Pt wants to see if she will go into labor now, but wants to schedule a repeat  section on 21 at 40 3/7 weeks if she doesn't go into spontaneous labor. Message sent to staff.

## 2021-12-07 NOTE — TELEPHONE ENCOUNTER
Spoke to dasia at [facility] and scheduled repeat  on 2021 @ 1130. Called pt and informed her of date and time. Aware to arrive 2 hours prior and to be NPO after midnight. PN appt scheduled for Monday,  with JOHN.      [employer] message also sent to pt  -assist pending

## 2021-12-07 NOTE — TELEPHONE ENCOUNTER
Please schedule the following surgery:    Procedure:repeat  section   Assist: (Y/N or none)yes  Date: 21  Dx: prior  section. Pre-op appt: (Y/N or n/a)no  Admission type: (IN/OUT/OBVS)3 days  Department of discharge(SDS/Floor):  Expected length of stay:3 days  Procedure length time (please enter amount you are requesting):1 hour  Recovery time (patients always ask):  Medical Clearance: (Y/N)      ALL Medicaid/including BCBS community: Tubal/ Hyst form MUST be signed (30 days):     COVID19 Lab 5062 needs to be placed for all C-sections, IOL & Versions     Message to nurses:

## 2021-12-07 NOTE — TELEPHONE ENCOUNTER
Returned pt's call, pt calling to check status of forms, informed pt of turnaround time, pt verbalized understanding and states he needs forms turned in by 12/15/21. Due to pregnancy issues leave started on 11/4/21.  Pt would like forms faxed upon completio

## 2021-12-09 NOTE — TELEPHONE ENCOUNTER
Dr. Servando Rodriguez,     Please sign off on form:  -Highlight the patient and hit \"Chart\" button.   -In Chart Review, w/in the Encounter tab - click 1 time on the Telephone call encounter for 11/22/21Scroll down the telephone encounter.  -Click \"scan on\" blue Hy

## 2021-12-09 NOTE — TELEPHONE ENCOUNTER
Our providers are unavailable to assist this case. msg to OP group to check for an assist.     [facility] and [facility] can not assist 1130 cases.

## 2021-12-11 ENCOUNTER — LAB ENCOUNTER (OUTPATIENT)
Dept: LAB | Facility: HOSPITAL | Age: 31
End: 2021-12-11
Attending: OBSTETRICS & GYNECOLOGY
Payer: COMMERCIAL

## 2021-12-11 DIAGNOSIS — Z01.818 PREOP TESTING: ICD-10-CM

## 2021-12-11 PROCEDURE — 86850 RBC ANTIBODY SCREEN: CPT

## 2021-12-11 PROCEDURE — 36415 COLL VENOUS BLD VENIPUNCTURE: CPT

## 2021-12-11 PROCEDURE — 86901 BLOOD TYPING SEROLOGIC RH(D): CPT

## 2021-12-11 PROCEDURE — 85025 COMPLETE CBC W/AUTO DIFF WBC: CPT

## 2021-12-11 PROCEDURE — 86900 BLOOD TYPING SEROLOGIC ABO: CPT

## 2021-12-13 ENCOUNTER — HOSPITAL ENCOUNTER (INPATIENT)
Facility: HOSPITAL | Age: 31
LOS: 4 days | Discharge: HOME | End: 2021-12-17
Attending: OBSTETRICS & GYNECOLOGY | Admitting: OBSTETRICS & GYNECOLOGY
Payer: COMMERCIAL

## 2021-12-13 ENCOUNTER — ROUTINE PRENATAL (OUTPATIENT)
Dept: OBGYN CLINIC | Facility: CLINIC | Age: 31
End: 2021-12-13
Payer: COMMERCIAL

## 2021-12-13 ENCOUNTER — APPOINTMENT (OUTPATIENT)
Dept: ULTRASOUND IMAGING | Facility: HOSPITAL | Age: 31
End: 2021-12-13
Attending: OBSTETRICS & GYNECOLOGY
Payer: COMMERCIAL

## 2021-12-13 ENCOUNTER — HOSPITAL ENCOUNTER (OUTPATIENT)
Facility: HOSPITAL | Age: 31
Discharge: HOME | End: 2021-12-13
Attending: OBSTETRICS & GYNECOLOGY | Admitting: OBSTETRICS & GYNECOLOGY
Payer: COMMERCIAL

## 2021-12-13 ENCOUNTER — TELEPHONE (OUTPATIENT)
Dept: OBGYN UNIT | Facility: HOSPITAL | Age: 31
End: 2021-12-13

## 2021-12-13 ENCOUNTER — NST DOCUMENTATION (OUTPATIENT)
Dept: OBGYN CLINIC | Facility: CLINIC | Age: 31
End: 2021-12-13

## 2021-12-13 VITALS
BODY MASS INDEX: 37 KG/M2 | HEART RATE: 96 BPM | DIASTOLIC BLOOD PRESSURE: 76 MMHG | WEIGHT: 210 LBS | SYSTOLIC BLOOD PRESSURE: 124 MMHG

## 2021-12-13 VITALS — DIASTOLIC BLOOD PRESSURE: 69 MMHG | HEART RATE: 108 BPM | SYSTOLIC BLOOD PRESSURE: 123 MMHG

## 2021-12-13 DIAGNOSIS — Z34.83 ENCOUNTER FOR SUPERVISION OF OTHER NORMAL PREGNANCY IN THIRD TRIMESTER: Primary | ICD-10-CM

## 2021-12-13 DIAGNOSIS — O36.8190 DECREASED FETAL MOVEMENT: ICD-10-CM

## 2021-12-13 DIAGNOSIS — O34.219 PREVIOUS CESAREAN DELIVERY, ANTEPARTUM CONDITION OR COMPLICATION: ICD-10-CM

## 2021-12-13 PROBLEM — Z51.81 THERAPEUTIC DRUG MONITORING: Chronic | Status: RESOLVED | Noted: 2019-03-06 | Resolved: 2021-12-13

## 2021-12-13 PROBLEM — Z34.90 SUPERVISION OF NORMAL PREGNANCY (HCC): Status: ACTIVE | Noted: 2021-12-13

## 2021-12-13 PROBLEM — Z34.90 PREGNANCY (HCC): Status: ACTIVE | Noted: 2021-12-13

## 2021-12-13 PROBLEM — Z34.90 SUPERVISION OF NORMAL PREGNANCY: Status: ACTIVE | Noted: 2021-12-13

## 2021-12-13 PROBLEM — Z34.90 PREGNANCY: Status: ACTIVE | Noted: 2021-12-13

## 2021-12-13 PROCEDURE — 81002 URINALYSIS NONAUTO W/O SCOPE: CPT | Performed by: OBSTETRICS & GYNECOLOGY

## 2021-12-13 PROCEDURE — 3074F SYST BP LT 130 MM HG: CPT | Performed by: OBSTETRICS & GYNECOLOGY

## 2021-12-13 PROCEDURE — 59025 FETAL NON-STRESS TEST: CPT | Performed by: OBSTETRICS & GYNECOLOGY

## 2021-12-13 PROCEDURE — 3078F DIAST BP <80 MM HG: CPT | Performed by: OBSTETRICS & GYNECOLOGY

## 2021-12-13 PROCEDURE — 76815 OB US LIMITED FETUS(S): CPT | Performed by: OBSTETRICS & GYNECOLOGY

## 2021-12-13 PROCEDURE — 59025 FETAL NON-STRESS TEST: CPT

## 2021-12-13 RX ORDER — CEFAZOLIN SODIUM/WATER 2 G/20 ML
2 SYRINGE (ML) INTRAVENOUS ONCE
Status: COMPLETED | OUTPATIENT
Start: 2021-12-14 | End: 2021-12-14

## 2021-12-13 RX ORDER — TRISODIUM CITRATE DIHYDRATE AND CITRIC ACID MONOHYDRATE 500; 334 MG/5ML; MG/5ML
30 SOLUTION ORAL ONCE
Status: COMPLETED | OUTPATIENT
Start: 2021-12-14 | End: 2021-12-14

## 2021-12-13 RX ORDER — METOCLOPRAMIDE 10 MG/1
10 TABLET ORAL ONCE
Status: COMPLETED | OUTPATIENT
Start: 2021-12-14 | End: 2021-12-14

## 2021-12-13 RX ORDER — FAMOTIDINE 20 MG/1
20 TABLET ORAL ONCE
Status: COMPLETED | OUTPATIENT
Start: 2021-12-14 | End: 2021-12-14

## 2021-12-13 RX ORDER — ONDANSETRON 2 MG/ML
4 INJECTION INTRAMUSCULAR; INTRAVENOUS EVERY 6 HOURS PRN
Status: DISCONTINUED | OUTPATIENT
Start: 2021-12-13 | End: 2021-12-14 | Stop reason: HOSPADM

## 2021-12-13 RX ORDER — FAMOTIDINE 10 MG/ML
20 INJECTION, SOLUTION INTRAVENOUS ONCE
Status: COMPLETED | OUTPATIENT
Start: 2021-12-14 | End: 2021-12-14

## 2021-12-13 RX ORDER — ACETAMINOPHEN 500 MG
1000 TABLET ORAL ONCE
Status: COMPLETED | OUTPATIENT
Start: 2021-12-14 | End: 2021-12-14

## 2021-12-13 RX ORDER — METOCLOPRAMIDE HYDROCHLORIDE 5 MG/ML
10 INJECTION INTRAMUSCULAR; INTRAVENOUS ONCE
Status: COMPLETED | OUTPATIENT
Start: 2021-12-14 | End: 2021-12-14

## 2021-12-13 RX ORDER — SODIUM CHLORIDE, SODIUM LACTATE, POTASSIUM CHLORIDE, CALCIUM CHLORIDE 600; 310; 30; 20 MG/100ML; MG/100ML; MG/100ML; MG/100ML
125 INJECTION, SOLUTION INTRAVENOUS CONTINUOUS
Status: DISCONTINUED | OUTPATIENT
Start: 2021-12-14 | End: 2021-12-14 | Stop reason: HOSPADM

## 2021-12-13 NOTE — PROGRESS NOTES
Decreased fetal movement. Denies contractions, vaginal bleeding or leaking fluid. Scheduled for repeat  section for tomorrow. To go now to [facility] for NST and KEVIN.   History of oligohydramnios and fetal intolerance to labor with prior pregnancy

## 2021-12-13 NOTE — NST
Nonstress Test   Patient: Tish Thomas    Gestation: 40w2d    NST: postdates       Variability: Moderate           Accelerations: Yes           Decelerations: None            Baseline: 140 BPM           Uterine Irritability: No           Contra

## 2021-12-13 NOTE — TELEPHONE ENCOUNTER
Dr. Aiyana Page,      Please sign off on form:  -Highlight the patient and hit \"Chart\" button. -In Chart Review, w/in the Encounter tab - click 1 time on the Telephone call encounter for 11/22/21Scroll down the telephone encounter.  -Click \"scan on\" blue Hyperlink under \"Media\" heading for FMLA Dr. Aiyana Page 12/13/21 w/in the telephone enc.  -Click on Acknowledge button at the bottom right corner and left-click onto image, signature stamp appears and drag signature to Provider signature line. Stamp will turn blue. Close window.      Thank you,      Ann Mcdonald

## 2021-12-13 NOTE — PROGRESS NOTES
Pt is a 32year old female admitted to TR4/TR4-A. Patient presents with:  Non-stress Test: postdates, decreased FM     Pt is  40w2d intra-uterine pregnancy. History obtained, consents signed. Oriented to room, staff, and plan of care.

## 2021-12-14 ENCOUNTER — ANESTHESIA EVENT (OUTPATIENT)
Dept: OBGYN UNIT | Facility: HOSPITAL | Age: 31
End: 2021-12-14
Payer: COMMERCIAL

## 2021-12-14 ENCOUNTER — ANESTHESIA (OUTPATIENT)
Dept: OBGYN UNIT | Facility: HOSPITAL | Age: 31
End: 2021-12-14
Payer: COMMERCIAL

## 2021-12-14 PROCEDURE — 59514 CESAREAN DELIVERY ONLY: CPT | Performed by: OBSTETRICS & GYNECOLOGY

## 2021-12-14 PROCEDURE — 59510 CESAREAN DELIVERY: CPT | Performed by: OBSTETRICS & GYNECOLOGY

## 2021-12-14 RX ORDER — DEXTROSE, SODIUM CHLORIDE, SODIUM LACTATE, POTASSIUM CHLORIDE, AND CALCIUM CHLORIDE 5; .6; .31; .03; .02 G/100ML; G/100ML; G/100ML; G/100ML; G/100ML
INJECTION, SOLUTION INTRAVENOUS CONTINUOUS
Status: DISCONTINUED | OUTPATIENT
Start: 2021-12-14 | End: 2021-12-17

## 2021-12-14 RX ORDER — MORPHINE SULFATE 1 MG/ML
INJECTION, SOLUTION EPIDURAL; INTRATHECAL; INTRAVENOUS
Status: COMPLETED | OUTPATIENT
Start: 2021-12-14 | End: 2021-12-14

## 2021-12-14 RX ORDER — KETOROLAC TROMETHAMINE 30 MG/ML
INJECTION, SOLUTION INTRAMUSCULAR; INTRAVENOUS
Status: COMPLETED
Start: 2021-12-14 | End: 2021-12-14

## 2021-12-14 RX ORDER — HYDROCODONE BITARTRATE AND ACETAMINOPHEN 5; 325 MG/1; MG/1
2 TABLET ORAL AS NEEDED
Status: DISCONTINUED | OUTPATIENT
Start: 2021-12-14 | End: 2021-12-17

## 2021-12-14 RX ORDER — METHYLERGONOVINE MALEATE 0.2 MG/ML
INJECTION INTRAVENOUS AS NEEDED
Status: DISCONTINUED | OUTPATIENT
Start: 2021-12-14 | End: 2021-12-14 | Stop reason: SURG

## 2021-12-14 RX ORDER — MISOPROSTOL 200 UG/1
TABLET ORAL
Status: DISCONTINUED
Start: 2021-12-14 | End: 2021-12-14 | Stop reason: WASHOUT

## 2021-12-14 RX ORDER — HYDROMORPHONE HYDROCHLORIDE 1 MG/ML
0.6 INJECTION, SOLUTION INTRAMUSCULAR; INTRAVENOUS; SUBCUTANEOUS EVERY 5 MIN PRN
Status: DISCONTINUED | OUTPATIENT
Start: 2021-12-14 | End: 2021-12-17

## 2021-12-14 RX ORDER — BISACODYL 10 MG
10 SUPPOSITORY, RECTAL RECTAL ONCE AS NEEDED
Status: COMPLETED | OUTPATIENT
Start: 2021-12-14 | End: 2021-12-16

## 2021-12-14 RX ORDER — HYDROMORPHONE HYDROCHLORIDE 1 MG/ML
0.4 INJECTION, SOLUTION INTRAMUSCULAR; INTRAVENOUS; SUBCUTANEOUS EVERY 5 MIN PRN
Status: DISCONTINUED | OUTPATIENT
Start: 2021-12-14 | End: 2021-12-17

## 2021-12-14 RX ORDER — MORPHINE SULFATE 4 MG/ML
4 INJECTION, SOLUTION INTRAMUSCULAR; INTRAVENOUS EVERY 10 MIN PRN
Status: DISCONTINUED | OUTPATIENT
Start: 2021-12-14 | End: 2021-12-17

## 2021-12-14 RX ORDER — HYDROCODONE BITARTRATE AND ACETAMINOPHEN 7.5; 325 MG/1; MG/1
1 TABLET ORAL EVERY 6 HOURS PRN
Status: ACTIVE | OUTPATIENT
Start: 2021-12-14 | End: 2021-12-15

## 2021-12-14 RX ORDER — ONDANSETRON 2 MG/ML
4 INJECTION INTRAMUSCULAR; INTRAVENOUS ONCE AS NEEDED
Status: ACTIVE | OUTPATIENT
Start: 2021-12-14 | End: 2021-12-14

## 2021-12-14 RX ORDER — GABAPENTIN 300 MG/1
300 CAPSULE ORAL EVERY 8 HOURS PRN
Status: DISCONTINUED | OUTPATIENT
Start: 2021-12-14 | End: 2021-12-17

## 2021-12-14 RX ORDER — MORPHINE SULFATE 2 MG/ML
2 INJECTION, SOLUTION INTRAMUSCULAR; INTRAVENOUS EVERY 10 MIN PRN
Status: DISCONTINUED | OUTPATIENT
Start: 2021-12-14 | End: 2021-12-17

## 2021-12-14 RX ORDER — ONDANSETRON 2 MG/ML
4 INJECTION INTRAMUSCULAR; INTRAVENOUS EVERY 6 HOURS PRN
Status: DISCONTINUED | OUTPATIENT
Start: 2021-12-14 | End: 2021-12-17

## 2021-12-14 RX ORDER — HALOPERIDOL 5 MG/ML
0.5 INJECTION INTRAMUSCULAR ONCE AS NEEDED
Status: ACTIVE | OUTPATIENT
Start: 2021-12-14 | End: 2021-12-14

## 2021-12-14 RX ORDER — HYDROMORPHONE HYDROCHLORIDE 1 MG/ML
0.6 INJECTION, SOLUTION INTRAMUSCULAR; INTRAVENOUS; SUBCUTANEOUS
Status: ACTIVE | OUTPATIENT
Start: 2021-12-14 | End: 2021-12-15

## 2021-12-14 RX ORDER — HALOPERIDOL 5 MG/ML
0.25 INJECTION INTRAMUSCULAR ONCE AS NEEDED
Status: ACTIVE | OUTPATIENT
Start: 2021-12-14 | End: 2021-12-14

## 2021-12-14 RX ORDER — ACETAMINOPHEN 325 MG/1
650 TABLET ORAL EVERY 6 HOURS PRN
Status: ACTIVE | OUTPATIENT
Start: 2021-12-14 | End: 2021-12-15

## 2021-12-14 RX ORDER — NALOXONE HYDROCHLORIDE 0.4 MG/ML
80 INJECTION, SOLUTION INTRAMUSCULAR; INTRAVENOUS; SUBCUTANEOUS AS NEEDED
Status: ACTIVE | OUTPATIENT
Start: 2021-12-14 | End: 2021-12-14

## 2021-12-14 RX ORDER — HYDROMORPHONE HYDROCHLORIDE 1 MG/ML
0.2 INJECTION, SOLUTION INTRAMUSCULAR; INTRAVENOUS; SUBCUTANEOUS EVERY 5 MIN PRN
Status: DISCONTINUED | OUTPATIENT
Start: 2021-12-14 | End: 2021-12-17

## 2021-12-14 RX ORDER — SODIUM CHLORIDE, SODIUM LACTATE, POTASSIUM CHLORIDE, CALCIUM CHLORIDE 600; 310; 30; 20 MG/100ML; MG/100ML; MG/100ML; MG/100ML
INJECTION, SOLUTION INTRAVENOUS CONTINUOUS
Status: DISCONTINUED | OUTPATIENT
Start: 2021-12-14 | End: 2021-12-17

## 2021-12-14 RX ORDER — AMMONIA INHALANTS 0.04 G/.3ML
0.3 INHALANT RESPIRATORY (INHALATION) AS NEEDED
Status: DISCONTINUED | OUTPATIENT
Start: 2021-12-14 | End: 2021-12-17

## 2021-12-14 RX ORDER — PROCHLORPERAZINE EDISYLATE 5 MG/ML
5 INJECTION INTRAMUSCULAR; INTRAVENOUS ONCE AS NEEDED
Status: ACTIVE | OUTPATIENT
Start: 2021-12-14 | End: 2021-12-14

## 2021-12-14 RX ORDER — SODIUM CHLORIDE 9 MG/ML
INJECTION, SOLUTION INTRAVENOUS CONTINUOUS PRN
Status: DISCONTINUED | OUTPATIENT
Start: 2021-12-14 | End: 2021-12-14 | Stop reason: SURG

## 2021-12-14 RX ORDER — ACETAMINOPHEN 500 MG
1000 TABLET ORAL EVERY 6 HOURS
Status: DISCONTINUED | OUTPATIENT
Start: 2021-12-14 | End: 2021-12-17

## 2021-12-14 RX ORDER — HYDROMORPHONE HYDROCHLORIDE 1 MG/ML
0.4 INJECTION, SOLUTION INTRAMUSCULAR; INTRAVENOUS; SUBCUTANEOUS
Status: ACTIVE | OUTPATIENT
Start: 2021-12-14 | End: 2021-12-15

## 2021-12-14 RX ORDER — SIMETHICONE 80 MG
80 TABLET,CHEWABLE ORAL 3 TIMES DAILY PRN
Status: DISCONTINUED | OUTPATIENT
Start: 2021-12-14 | End: 2021-12-17

## 2021-12-14 RX ORDER — HYDROCODONE BITARTRATE AND ACETAMINOPHEN 5; 325 MG/1; MG/1
1 TABLET ORAL AS NEEDED
Status: DISCONTINUED | OUTPATIENT
Start: 2021-12-14 | End: 2021-12-17

## 2021-12-14 RX ORDER — DIPHENHYDRAMINE HYDROCHLORIDE 50 MG/ML
12.5 INJECTION INTRAMUSCULAR; INTRAVENOUS EVERY 4 HOURS PRN
Status: ACTIVE | OUTPATIENT
Start: 2021-12-14 | End: 2021-12-15

## 2021-12-14 RX ORDER — TRANEXAMIC ACID 10 MG/ML
INJECTION, SOLUTION INTRAVENOUS
Status: DISCONTINUED
Start: 2021-12-14 | End: 2021-12-14 | Stop reason: WASHOUT

## 2021-12-14 RX ORDER — LIDOCAINE HYDROCHLORIDE 10 MG/ML
INJECTION, SOLUTION INFILTRATION; PERINEURAL
Status: COMPLETED | OUTPATIENT
Start: 2021-12-14 | End: 2021-12-14

## 2021-12-14 RX ORDER — NALBUPHINE HCL 10 MG/ML
2.5 AMPUL (ML) INJECTION EVERY 4 HOURS PRN
Status: ACTIVE | OUTPATIENT
Start: 2021-12-14 | End: 2021-12-15

## 2021-12-14 RX ORDER — MORPHINE SULFATE 10 MG/ML
6 INJECTION, SOLUTION INTRAMUSCULAR; INTRAVENOUS EVERY 10 MIN PRN
Status: DISCONTINUED | OUTPATIENT
Start: 2021-12-14 | End: 2021-12-17

## 2021-12-14 RX ORDER — DIPHENHYDRAMINE HCL 25 MG
25 CAPSULE ORAL EVERY 4 HOURS PRN
Status: ACTIVE | OUTPATIENT
Start: 2021-12-14 | End: 2021-12-15

## 2021-12-14 RX ORDER — HYDROCODONE BITARTRATE AND ACETAMINOPHEN 7.5; 325 MG/1; MG/1
2 TABLET ORAL EVERY 6 HOURS PRN
Status: ACTIVE | OUTPATIENT
Start: 2021-12-14 | End: 2021-12-15

## 2021-12-14 RX ORDER — NALOXONE HYDROCHLORIDE 0.4 MG/ML
0.08 INJECTION, SOLUTION INTRAMUSCULAR; INTRAVENOUS; SUBCUTANEOUS
Status: ACTIVE | OUTPATIENT
Start: 2021-12-14 | End: 2021-12-15

## 2021-12-14 RX ORDER — BUPIVACAINE HYDROCHLORIDE 7.5 MG/ML
INJECTION, SOLUTION INTRASPINAL
Status: COMPLETED | OUTPATIENT
Start: 2021-12-14 | End: 2021-12-14

## 2021-12-14 RX ORDER — DOCUSATE SODIUM 100 MG/1
100 CAPSULE, LIQUID FILLED ORAL
Status: DISCONTINUED | OUTPATIENT
Start: 2021-12-14 | End: 2021-12-17

## 2021-12-14 RX ADMIN — LIDOCAINE HYDROCHLORIDE 5 ML: 10 INJECTION, SOLUTION INFILTRATION; PERINEURAL at 12:10:00

## 2021-12-14 RX ADMIN — ONDANSETRON 4 MG: 2 INJECTION INTRAMUSCULAR; INTRAVENOUS at 12:44:00

## 2021-12-14 RX ADMIN — METHYLERGONOVINE MALEATE 0.2 MG: 0.2 INJECTION INTRAVENOUS at 12:42:00

## 2021-12-14 RX ADMIN — BUPIVACAINE HYDROCHLORIDE 1.5 ML: 7.5 INJECTION, SOLUTION INTRASPINAL at 12:10:00

## 2021-12-14 RX ADMIN — MORPHINE SULFATE 0.3 MG: 1 INJECTION, SOLUTION EPIDURAL; INTRATHECAL; INTRAVENOUS at 12:10:00

## 2021-12-14 RX ADMIN — SODIUM CHLORIDE: 9 INJECTION, SOLUTION INTRAVENOUS at 13:41:00

## 2021-12-14 RX ADMIN — CEFAZOLIN SODIUM/WATER 2 G: 2 G/20 ML SYRINGE (ML) INTRAVENOUS at 11:55:00

## 2021-12-14 RX ADMIN — SODIUM CHLORIDE: 9 INJECTION, SOLUTION INTRAVENOUS at 11:54:00

## 2021-12-14 NOTE — ANESTHESIA PREPROCEDURE EVALUATION
Anesthesia PreOp Note    HPI:     Wade Patel is a 32year old female who presents for preoperative consultation requested by: Tom Pavon MD    Date of Surgery: 2021    Procedure(s):   SECTION  Indication: * No pre-op diag ml 0.9% NS premix infusion 21.6 Units, 21.6 Units, Intravenous, Once, Chari Catalan MD  ceFAZolin (ANCEF/KEFZOL) 2 GM/20ML premix IV syringe 2 g, 2 g, Intravenous, Once, Chari Catalan MD    No current [facility]-ordered outpatient medications on file. 12/11/2021    .0 12/11/2021     Lab Results   Component Value Date     11/04/2021    K 3.6 11/04/2021     11/04/2021    CO2 24.0 11/04/2021    BUN 10 11/04/2021    CREATSERUM 0.60 11/04/2021    GLU 92 11/04/2021    CA 8.8 11/04/2021

## 2021-12-14 NOTE — PROGRESS NOTES
[facility]    OB/GYNE Progress Note      Drew Escamilla Patient Status:  Inpatient    1990 MRN [id number]   Location [address]  Attending Sampson Rinne, MD   [facility] Day # 1 PCP Guillermo Crain Lab Results   Component Value Date    B12 859 03/13/2019    COLORUR Yellow 11/04/2021    CLARITY Hazy (A) 11/04/2021    SPECGRAVITY 1.015 12/13/2021    PROUR Negative 11/04/2021    GLUUR Negative 11/04/2021    KETUR Negative 11/04/2021    Deanna Aldrich

## 2021-12-14 NOTE — ANESTHESIA POSTPROCEDURE EVALUATION
Patient: Cynthia Estimable    Procedure Summary     Date: 21 Room / Location: [facility] L+D OR  [facility] L+D OR    Anesthesia Start: 1153 Anesthesia Stop:     Procedure:  SECTION (N/A Uterus) Diagnosis: (repeat )    Surgeons: Heather Meyer,

## 2021-12-14 NOTE — L&D DELIVERY NOTE
Kevin Mix, Girl [[id number]]    Labor Events     labor?: No   steroids?: None  Antibiotics received during labor?: No  Antibiotics (enter # doses in comment): other (Comment: ancef pre-op)  Rupture date/time: 2021 1238     Rupture Heart rate Absent <100 bpm >100 bpm    Reflex irritability No response Grimace Cry or active withdrawal    Muscle tone Limp Some flexion Active motion    Respiratory effort Absent Weak cry; hypoventilation Good, crying              1 Minute:  5 Minute: [1]  Position Right [3] Occiput [1] Anterior [1]    Apgars:  1 minute: 8               5 minutes: 9                        10 minutes:      Placenta  Date/Time of Delivery: 12/14/2021 12:40 PM   Delivery: spontaneous  Placenta to Pathology: yes  Cord Gases

## 2021-12-14 NOTE — H&P
[address] Patient Status:  Inpatient    1990 MRN [id number]   Location [address]  Attending Sampson Rinne, MD   Hosp Day # 0 PCP Isabella June STYLE (Patient not taking: Reported on 12/13/2021), Disp: 1 each, Rfl: 0  Prenatal 27-0.8 MG Oral Tab, Take 1 tablet by mouth daily. , Disp: , Rfl:         Review of Systems:   As documented in HPI        Physical Exam:   Pulse:  [] 108  BP: (123-124) Cholelithiasis affecting pregnancy, antepartum     Pregnancy-induced glucose intolerance     Supervision of normal pregnancy                                           Mary Lou Crest is at 36 and 2/7 weeks gestation. Not in labor.  and KEVIN = 7 cm

## 2021-12-14 NOTE — PROGRESS NOTES
Pt is a 32year old female admitted to [place] [address]. Patient presents with:  Scheduled    for overnight FHR observation     Pt is  40w2d intra-uterine pregnancy. History obtained, consents signed.  Oriented to room, staff, and plan of care

## 2021-12-14 NOTE — ANESTHESIA PROCEDURE NOTES
Spinal Block    Date/Time: 12/14/2021 12:10 PM  Performed by: Carlo Travis MD  Authorized by: Carlo Travis MD       General Information and Staff    Start Time:  12/14/2021 11:58 AM  End Time:  12/14/2021 12:10 PM  Anesthesiologist:  Lesa Garcia

## 2021-12-14 NOTE — NST
Nonstress Test   Patient: Samson Pickler    Gestation: 40w2d    Diagnosis from order:         NST:         NST DOCUMENTATION 12/13/2021   Variability 6-25 BPM   Accelerations Yes   Decelerations None   Baseline 140   Uterine Irritability No   Con

## 2021-12-15 RX ORDER — IBUPROFEN 600 MG/1
600 TABLET ORAL EVERY 6 HOURS PRN
Status: DISCONTINUED | OUTPATIENT
Start: 2021-12-15 | End: 2021-12-17

## 2021-12-15 RX ORDER — MELATONIN
325
Status: DISCONTINUED | OUTPATIENT
Start: 2021-12-15 | End: 2021-12-17

## 2021-12-15 NOTE — PROGRESS NOTES
[facility]    OB/GYNE Progress Note      Kaleb Mcgarry Patient Status:  Inpatient    1990 MRN [id number]   Location [facility] 3SE Attending Camilla Hoffman MD   Hosp Day # 2 PCP Guido Oropeza MD

## 2021-12-15 NOTE — PLAN OF CARE
Problem: BIRTH - VAGINAL/ SECTION  Goal: Fetal and maternal status remain reassuring during the birth process  Description: INTERVENTIONS:  - Monitor vital signs  - Monitor fetal heart rate  - Monitor uterine activity  - Monitor labor progression choices  Outcome: Progressing     Problem: Patient/Family Goals  Goal: Patient/Family Long Term Goal  Description: Patient's Long Term Goal:     Interventions:  -   - See additional Care Plan goals for specific interventions  Outcome: Progressing  Goal: Pa over-consumption  - Identify factors contributing to increased intake, treat as appropriate  - Monitor I&O, WT and lab values  - Obtain nutritional consult as needed  - Evaluate psychosocial factors contributing to over-consumption  Outcome: Completed actively listen. - Educate father/SO about benefits of breast feeding and how to manage common lactation challenges.   - Recommend avoidance of specific medications or substances incompatible with breast feeding.  - Assess and monitor for signs of nipple p

## 2021-12-15 NOTE — ANESTHESIA POST-OP FOLLOW-UP NOTE
POD#1. Stable vital signs. Patient complaints of mild itching. Pain well controlled. No other issues.

## 2021-12-16 NOTE — PROGRESS NOTES
[facility]    OB/GYNE Progress Note      Lore Quivers Patient Status:  Inpatient    1990 MRN [id number]   Location [facility] 3SE Attending Paola Hurtado MD   Hosp Day # 3 PCP Giovani Fountain MD (L) 11/04/2021    ALKPHO 112 (H) 11/04/2021    BILT 0.2 11/04/2021    TP 6.0 (L) 11/04/2021    AST 12 (L) 11/04/2021    ALT 14 11/04/2021    TSH 2.270 10/20/2020       Lab Results   Component Value Date    B12 859 03/13/2019    COLORUR Yellow 11/04/2021

## 2021-12-16 NOTE — OPERATIVE REPORT
[facility]    PATIENT'S NAME: Pablo Lewis, [address] PHYSICIAN: Christian Waters MD   OPERATING PHYSICIAN: Mimi Hearn MD   PATIENT ACCOUNT#:   [de-identified]    LOCATION:  3SE [address] [facility] [address] #:   [id number]       DATE vesicouterine peritoneum incised transversely. There was evidence of prior perivesical peritoneum adhesions which were reflected inferiorly. A low transverse uterine incision was made and extended with the fingers.   The hand was introduced and the vertex

## 2021-12-16 NOTE — PLAN OF CARE
Problem: PAIN - ADULT  Goal: Verbalizes/displays adequate comfort level or patient's stated pain goal  Description: INTERVENTIONS:  - Encourage pt to monitor pain and request assistance  - Assess pain using appropriate pain scale  - Administer analgesics specific interventions  Outcome: Progressing     Problem: GASTROINTESTINAL - ADULT  Goal: Maintains or returns to baseline bowel function  Description: INTERVENTIONS:  - Assess bowel function  - Maintain adequate hydration with IV or PO as ordered and tole adequate milk supply with medication/procedure interruptions  Description: INTERVENTIONS:  - Review techniques for milk expression (breast pumping). - Provide pumping equipment/supplies, instructions, and assistance until it is safe to breastfeed infant.

## 2021-12-16 NOTE — LACTATION NOTE
This note was copied from a baby's chart.   LACTATION NOTE - INFANT    Evaluation Type  Evaluation Type: Inpatient    Problems & Assessment  Infant Assessment: Minimal hunger cues present  Muscle tone: Appropriate for GA    Feeding Assessment  Summary Regan Silva

## 2021-12-17 ENCOUNTER — TELEPHONE (OUTPATIENT)
Dept: OBGYN CLINIC | Facility: CLINIC | Age: 31
End: 2021-12-17

## 2021-12-17 VITALS
TEMPERATURE: 98 F | OXYGEN SATURATION: 97 % | RESPIRATION RATE: 14 BRPM | DIASTOLIC BLOOD PRESSURE: 69 MMHG | SYSTOLIC BLOOD PRESSURE: 111 MMHG | HEART RATE: 72 BPM

## 2021-12-17 RX ORDER — IBUPROFEN 600 MG/1
600 TABLET ORAL EVERY 6 HOURS PRN
Qty: 30 TABLET | Refills: 1 | Status: SHIPPED | OUTPATIENT
Start: 2021-12-17 | End: 2022-01-02

## 2021-12-17 RX ORDER — MELATONIN
325
Qty: 30 TABLET | Refills: 0 | Status: SHIPPED | OUTPATIENT
Start: 2021-12-18 | End: 2022-01-04

## 2021-12-17 RX ORDER — ACETAMINOPHEN 500 MG
1000 TABLET ORAL EVERY 6 HOURS
Qty: 60 TABLET | Refills: 0 | Status: SHIPPED | OUTPATIENT
Start: 2021-12-17 | End: 2021-12-25

## 2021-12-17 RX ORDER — PSEUDOEPHEDRINE HCL 30 MG
100 TABLET ORAL 2 TIMES DAILY
Qty: 30 CAPSULE | Refills: 0 | Status: SHIPPED | OUTPATIENT
Start: 2021-12-17 | End: 2022-01-01

## 2021-12-17 NOTE — TELEPHONE ENCOUNTER
Patient name and  verified. Patient scheduled for staple removal on 2021. Aware of time and location.

## 2021-12-17 NOTE — DISCHARGE SUMMARY
[facility]    Post-Partum Caesarean Section Discharge Summary/Note    Fany Langley Patient Status:  Inpatient    1990 MRN [id number]   Location [facility] 3SE Attending Roberto Carlos Velázquez MD   Hosp Day # 4 PCP Catarina section      Secondary Diagnosis: Glucose intolerance of pregnancy  Cholelithiasis in pregnancy    Primary OB Clinician:  [facility] clinic OB/GYN [facility]    Summary: Patient was admitted on 2021 for observation.   She underwent a repeat  secti mouth daily. Misc. Devices (BREAST PUMP) Does not apply Misc  DOUBLE ELECTRIC BREAST PUMP EQUIVALENT TO 36543 [employer] PUMP IN STYLE                Discharge Diet: General diet    Discharge Activity: Minimal activity for two weeks and no driving for 2 weeks.

## 2021-12-17 NOTE — PLAN OF CARE
Problem: PAIN - ADULT  Goal: Verbalizes/displays adequate comfort level or patient's stated pain goal  Description: INTERVENTIONS:  - Encourage pt to monitor pain and request assistance  - Assess pain using appropriate pain scale  - Administer analgesics precautions and use personal protective equipment as indicated. Ensure aseptic care of all intravenous lines and invasive tubes/drains.  - Obtain immunization and exposure to communicable diseases history.   Outcome: Progressing  Goal: Optimize infant feedi equipment/supplies, instructions, and assistance until it is safe to breastfeed infant. Outcome: Progressing  Goal: Experiences normal breast weaning course  Description: INTERVENTIONS:  - Assess for and manage engorgement. - Instruct on breast care.   -

## 2021-12-17 NOTE — TELEPHONE ENCOUNTER
Patient calling for staple removal, please call at [phone],[facility].

## 2021-12-20 ENCOUNTER — TELEPHONE (OUTPATIENT)
Dept: OBGYN CLINIC | Facility: CLINIC | Age: 31
End: 2021-12-20

## 2021-12-20 ENCOUNTER — NURSE ONLY (OUTPATIENT)
Dept: OBGYN CLINIC | Facility: CLINIC | Age: 31
End: 2021-12-20
Payer: COMMERCIAL

## 2021-12-20 NOTE — PROGRESS NOTES
Patient presents to clinic for staple removal. Incision is well approximated and intact. No signs of infection noted. Staples removed, cleansed with normal saline and hydrogen peroxide, and steri strips applied. Patient tolerated staple removal well.  Home

## 2021-12-20 NOTE — TELEPHONE ENCOUNTER
Patient seen in office today for staple removal. Patient complaining of gas pain and bloating. States pain feels like a band around her upper abdomen. Most noticeable after eating. Has no other complaints.  Has tried ambulating and simethicone without relie

## 2022-01-04 ENCOUNTER — OFFICE VISIT (OUTPATIENT)
Dept: OBGYN CLINIC | Facility: CLINIC | Age: 32
End: 2022-01-04
Payer: COMMERCIAL

## 2022-01-04 VITALS — DIASTOLIC BLOOD PRESSURE: 76 MMHG | SYSTOLIC BLOOD PRESSURE: 114 MMHG | WEIGHT: 185.63 LBS | BODY MASS INDEX: 33 KG/M2

## 2022-01-04 DIAGNOSIS — K59.09 OTHER CONSTIPATION: ICD-10-CM

## 2022-01-04 DIAGNOSIS — Z48.89 ENCOUNTER FOR POST SURGICAL WOUND CHECK: Primary | ICD-10-CM

## 2022-01-04 PROCEDURE — 3074F SYST BP LT 130 MM HG: CPT | Performed by: NURSE PRACTITIONER

## 2022-01-04 PROCEDURE — 3078F DIAST BP <80 MM HG: CPT | Performed by: NURSE PRACTITIONER

## 2022-01-04 NOTE — PROGRESS NOTES
[address] [facility]  Obstetrics and Gynecology  2 Week Post Operative Wound Check  Ariana Hammer, MSN, APRN, FNP-BC    HPI   Patient presents to the clinic for a 2 week post operative wound check. She had repeat C/S on 12/14/2021.   Doing well - pain being nuzhat perception normal.         Mood and Affect: Mood and affect normal.         Speech: Speech normal.         Behavior: Behavior normal. Behavior is cooperative. Thought Content:  Thought content normal.         Cognition and Memory: Cognition normal.

## 2022-01-08 ENCOUNTER — TELEPHONE (OUTPATIENT)
Dept: OBGYN UNIT | Facility: HOSPITAL | Age: 32
End: 2022-01-08

## 2022-01-08 RX ORDER — MELATONIN
Qty: 30 TABLET | Refills: 0 | OUTPATIENT
Start: 2022-01-08

## 2022-01-08 NOTE — PROGRESS NOTES
Reviewed self and infant care with mom, she verbalizes understanding of instruction reviewed. Encouraged to follow up with MD's as directed with questions/concerns. Cradle call letter sent for resource information.   Patient denies thoughts of harm to

## 2022-01-25 ENCOUNTER — POSTPARTUM (OUTPATIENT)
Dept: OBGYN CLINIC | Facility: CLINIC | Age: 32
End: 2022-01-25
Payer: COMMERCIAL

## 2022-01-25 VITALS — SYSTOLIC BLOOD PRESSURE: 102 MMHG | DIASTOLIC BLOOD PRESSURE: 74 MMHG | BODY MASS INDEX: 34 KG/M2 | WEIGHT: 191 LBS

## 2022-01-25 DIAGNOSIS — K60.2 ANAL FISSURE: ICD-10-CM

## 2022-01-25 DIAGNOSIS — N87.0 DYSPLASIA OF CERVIX, LOW GRADE (CIN 1): ICD-10-CM

## 2022-01-25 PROBLEM — Z34.90 PREGNANCY: Status: RESOLVED | Noted: 2021-12-13 | Resolved: 2022-01-25

## 2022-01-25 PROBLEM — Z34.90 PREGNANCY (HCC): Status: RESOLVED | Noted: 2021-12-13 | Resolved: 2022-01-25

## 2022-01-25 PROBLEM — N87.1 MODERATE DYSPLASIA OF CERVIX (CIN II): Status: ACTIVE | Noted: 2019-10-07

## 2022-01-25 PROCEDURE — 3078F DIAST BP <80 MM HG: CPT | Performed by: OBSTETRICS & GYNECOLOGY

## 2022-01-25 PROCEDURE — 3074F SYST BP LT 130 MM HG: CPT | Performed by: OBSTETRICS & GYNECOLOGY

## 2022-01-25 RX ORDER — LIDOCAINE AND PRILOCAINE 25; 25 MG/G; MG/G
CREAM TOPICAL ONCE
Status: CANCELLED | OUTPATIENT
Start: 2022-01-25 | End: 2022-01-25

## 2022-01-25 NOTE — PROGRESS NOTES
HPI:    Patient ID: Maria Ines Celaya is a 32year old year old female. HPI  6-week postpartum visit  43-year-old status post second   History of GIFTY-1 in the past last Pap test over a year ago normal cytology and negative HPV.   Complain normal.  Urethral meatus- without lesions, mass, or discharge. Urethra- normal without lesion, cyst, mass, or tenderness. Vulva- normal.  Labia majora and minora without lesions. Vagina- normal, no lesions or discharge. Moist and well supported.   Lonnie 2.5 % External Gel, Apply 1 Application topically 2 (two) times a day., Disp: 1 each, Rfl: 0  Prenatal 27-0.8 MG Oral Tab, Take 1 tablet by mouth daily. , Disp: , Rfl:     No facility-administered encounter medications on file as of 1/25/2022.

## 2022-01-26 ENCOUNTER — TELEPHONE (OUTPATIENT)
Dept: OBGYN CLINIC | Facility: CLINIC | Age: 32
End: 2022-01-26

## 2022-01-26 LAB — HPV I/H RISK 1 DNA SPEC QL NAA+PROBE: NEGATIVE

## 2022-01-26 NOTE — TELEPHONE ENCOUNTER
Current Outpatient Medications:     •  Lidocaine HCl 2.5 % External Gel, Apply 1 Application topically 2 (two) times a day., Disp: 1 each, Rfl: 0    PHARMACY COMMENTS:  Product backordered/unavailable: Gel not available.

## 2022-01-26 NOTE — TELEPHONE ENCOUNTER
Patient name and  verified. Patient informed of [employer] message and recommendations. Patient asking for return to work note.  Please advise

## 2022-02-02 LAB — LAST PAP RESULT: NORMAL

## 2022-02-03 ENCOUNTER — TELEPHONE (OUTPATIENT)
Dept: OBGYN CLINIC | Facility: CLINIC | Age: 32
End: 2022-02-03

## 2022-02-03 NOTE — TELEPHONE ENCOUNTER
MyChart message sent to pt.    ----- Message from Bunny Swartz MD sent at 2/3/2022 11:41 AM CST -----  Please notify by MyChart or letter of normal Pap test and normal HPV cotesting.

## 2022-06-02 NOTE — MR AVS SNAPSHOT
After Visit Summary   3/16/2020    Cynthia Estimable    MRN: [id number]           Visit Information     Date & Time  3/16/2020 10:30 AM Provider  Marialuisa Gauthier MD [address] [place] [address], [address] [place] [address],[address], [facility].  Phone  [phone] DO YOU KNOW WHERE TO GO? Injury & Illness are never convenient. If you are dealing with a   non-emergency, consider your options before heading to an ER.   VIDEO VISITS  Visit face-to-face with a [facility] physician or   PAM using your mobile device o show

## 2022-06-29 ENCOUNTER — OFFICE VISIT (OUTPATIENT)
Dept: PODIATRY CLINIC | Facility: CLINIC | Age: 32
End: 2022-06-29
Payer: COMMERCIAL

## 2022-06-29 DIAGNOSIS — B35.1 ONYCHOMYCOSIS: Primary | ICD-10-CM

## 2022-06-29 PROCEDURE — 99213 OFFICE O/P EST LOW 20 MIN: CPT | Performed by: PODIATRIST

## 2022-07-22 ENCOUNTER — OFFICE VISIT (OUTPATIENT)
Dept: INTERNAL MEDICINE CLINIC | Facility: CLINIC | Age: 32
End: 2022-07-22
Payer: COMMERCIAL

## 2022-07-22 VITALS
WEIGHT: 178 LBS | SYSTOLIC BLOOD PRESSURE: 122 MMHG | HEIGHT: 61.22 IN | DIASTOLIC BLOOD PRESSURE: 80 MMHG | TEMPERATURE: 98 F | OXYGEN SATURATION: 99 % | HEART RATE: 94 BPM | BODY MASS INDEX: 33.61 KG/M2

## 2022-07-22 DIAGNOSIS — Z71.3 WEIGHT LOSS COUNSELING, ENCOUNTER FOR: ICD-10-CM

## 2022-07-22 DIAGNOSIS — R07.89 ATYPICAL CHEST PAIN: ICD-10-CM

## 2022-07-22 DIAGNOSIS — G43.709 CHRONIC MIGRAINE WITHOUT AURA WITHOUT STATUS MIGRAINOSUS, NOT INTRACTABLE: ICD-10-CM

## 2022-07-22 DIAGNOSIS — R60.9 SWELLING: ICD-10-CM

## 2022-07-22 DIAGNOSIS — Z00.00 WELLNESS EXAMINATION: Primary | ICD-10-CM

## 2022-07-22 RX ORDER — TOPIRAMATE 25 MG/1
25 TABLET ORAL DAILY
Qty: 90 TABLET | Refills: 0 | Status: SHIPPED | OUTPATIENT
Start: 2022-07-22

## 2022-07-26 ENCOUNTER — TELEPHONE (OUTPATIENT)
Dept: PODIATRY CLINIC | Facility: CLINIC | Age: 32
End: 2022-07-26

## 2022-07-26 NOTE — TELEPHONE ENCOUNTER
Called pt , no answer and VM box full so could not LM. [employer] message sent.  If pt CB please offer her next available appt with Dr Femi Weathers

## 2022-07-26 NOTE — TELEPHONE ENCOUNTER
----- Message from Lacie Liu DPM sent at 7/26/2022  8:21 AM CDT -----  Please call this patient and notify them of the need for an office visit to the review fungus culture results thank you

## 2022-07-28 ENCOUNTER — LAB ENCOUNTER (OUTPATIENT)
Dept: LAB | Facility: REFERENCE LAB | Age: 32
End: 2022-07-28
Attending: FAMILY MEDICINE
Payer: COMMERCIAL

## 2022-07-28 ENCOUNTER — LAB ENCOUNTER (OUTPATIENT)
Dept: LAB | Facility: HOSPITAL | Age: 32
End: 2022-07-28
Attending: FAMILY MEDICINE
Payer: COMMERCIAL

## 2022-07-28 DIAGNOSIS — Z00.00 WELLNESS EXAMINATION: ICD-10-CM

## 2022-07-28 DIAGNOSIS — R60.9 SWELLING: ICD-10-CM

## 2022-07-28 DIAGNOSIS — R07.89 ATYPICAL CHEST PAIN: ICD-10-CM

## 2022-07-28 LAB
ALBUMIN SERPL-MCNC: 4 G/DL (ref 3.4–5)
ALBUMIN/GLOB SERPL: 1 {RATIO} (ref 1–2)
ALP LIVER SERPL-CCNC: 77 U/L
ALT SERPL-CCNC: 17 U/L
ANION GAP SERPL CALC-SCNC: 6 MMOL/L (ref 0–18)
AST SERPL-CCNC: 12 U/L (ref 15–37)
BASOPHILS # BLD AUTO: 0.03 X10(3) UL (ref 0–0.2)
BASOPHILS NFR BLD AUTO: 0.4 %
BILIRUB SERPL-MCNC: 0.4 MG/DL (ref 0.1–2)
BUN BLD-MCNC: 13 MG/DL (ref 7–18)
BUN/CREAT SERPL: 16.9 (ref 10–20)
CALCIUM BLD-MCNC: 9.4 MG/DL (ref 8.5–10.1)
CHLORIDE SERPL-SCNC: 113 MMOL/L (ref 98–112)
CHOLEST SERPL-MCNC: 146 MG/DL (ref ?–200)
CO2 SERPL-SCNC: 22 MMOL/L (ref 21–32)
CREAT BLD-MCNC: 0.77 MG/DL
DEPRECATED RDW RBC AUTO: 43.4 FL (ref 35.1–46.3)
EOSINOPHIL # BLD AUTO: 0.05 X10(3) UL (ref 0–0.7)
EOSINOPHIL NFR BLD AUTO: 0.7 %
ERYTHROCYTE [DISTWIDTH] IN BLOOD BY AUTOMATED COUNT: 13.2 % (ref 11–15)
EST. AVERAGE GLUCOSE BLD GHB EST-MCNC: 100 MG/DL (ref 68–126)
FASTING PATIENT LIPID ANSWER: YES
FASTING STATUS PATIENT QL REPORTED: YES
GLOBULIN PLAS-MCNC: 3.9 G/DL (ref 2.8–4.4)
GLUCOSE BLD-MCNC: 90 MG/DL (ref 70–99)
HBA1C MFR BLD: 5.1 % (ref ?–5.7)
HCT VFR BLD AUTO: 42.8 %
HDLC SERPL-MCNC: 45 MG/DL (ref 40–59)
HGB BLD-MCNC: 13.6 G/DL
IGA SERPL-MCNC: 205 MG/DL (ref 70–312)
IMM GRANULOCYTES # BLD AUTO: 0.02 X10(3) UL (ref 0–1)
IMM GRANULOCYTES NFR BLD: 0.3 %
LDLC SERPL CALC-MCNC: 90 MG/DL (ref ?–100)
LYMPHOCYTES # BLD AUTO: 1.3 X10(3) UL (ref 1–4)
LYMPHOCYTES NFR BLD AUTO: 19.4 %
MCH RBC QN AUTO: 28.5 PG (ref 26–34)
MCHC RBC AUTO-ENTMCNC: 31.8 G/DL (ref 31–37)
MCV RBC AUTO: 89.5 FL
MONOCYTES # BLD AUTO: 0.24 X10(3) UL (ref 0.1–1)
MONOCYTES NFR BLD AUTO: 3.6 %
NEUTROPHILS # BLD AUTO: 5.05 X10 (3) UL (ref 1.5–7.7)
NEUTROPHILS # BLD AUTO: 5.05 X10(3) UL (ref 1.5–7.7)
NEUTROPHILS NFR BLD AUTO: 75.6 %
NONHDLC SERPL-MCNC: 101 MG/DL (ref ?–130)
OSMOLALITY SERPL CALC.SUM OF ELEC: 292 MOSM/KG (ref 275–295)
PLATELET # BLD AUTO: 284 10(3)UL (ref 150–450)
POTASSIUM SERPL-SCNC: 4 MMOL/L (ref 3.5–5.1)
PROT SERPL-MCNC: 7.9 G/DL (ref 6.4–8.2)
RBC # BLD AUTO: 4.78 X10(6)UL
SODIUM SERPL-SCNC: 141 MMOL/L (ref 136–145)
TRIGL SERPL-MCNC: 48 MG/DL (ref 30–149)
TSI SER-ACNC: 0.74 MIU/ML (ref 0.36–3.74)
VLDLC SERPL CALC-MCNC: 8 MG/DL (ref 0–30)
WBC # BLD AUTO: 6.7 X10(3) UL (ref 4–11)

## 2022-07-28 PROCEDURE — 86003 ALLG SPEC IGE CRUDE XTRC EA: CPT

## 2022-07-28 PROCEDURE — 85025 COMPLETE CBC W/AUTO DIFF WBC: CPT

## 2022-07-28 PROCEDURE — 80053 COMPREHEN METABOLIC PANEL: CPT

## 2022-07-28 PROCEDURE — 93010 ELECTROCARDIOGRAM REPORT: CPT | Performed by: FAMILY MEDICINE

## 2022-07-28 PROCEDURE — 80061 LIPID PANEL: CPT

## 2022-07-28 PROCEDURE — 86038 ANTINUCLEAR ANTIBODIES: CPT

## 2022-07-28 PROCEDURE — 82784 ASSAY IGA/IGD/IGG/IGM EACH: CPT

## 2022-07-28 PROCEDURE — 84443 ASSAY THYROID STIM HORMONE: CPT

## 2022-07-28 PROCEDURE — 36415 COLL VENOUS BLD VENIPUNCTURE: CPT

## 2022-07-28 PROCEDURE — 86364 TISS TRNSGLTMNASE EA IG CLAS: CPT

## 2022-07-28 PROCEDURE — 82785 ASSAY OF IGE: CPT

## 2022-07-28 PROCEDURE — 83036 HEMOGLOBIN GLYCOSYLATED A1C: CPT

## 2022-07-28 PROCEDURE — 93005 ELECTROCARDIOGRAM TRACING: CPT

## 2022-08-01 LAB
CLAM IGE QN: <0.1 KUA/L (ref ?–0.1)
CODFISH IGE QN: <0.1 KUA/L (ref ?–0.1)
CORN IGE QN: <0.1 KUA/L (ref ?–0.1)
COW MILK IGE QN: <0.1 KUA/L (ref ?–0.1)
EGG WHITE IGE QN: <0.1 KUA/L (ref ?–0.1)
IGE SERPL-ACNC: 92.4 KU/L (ref 2–214)
NUCLEAR IGG TITR SER IF: NEGATIVE {TITER}
PEANUT IGE QN: <0.1 KUA/L (ref ?–0.1)
SCALLOP IGE QN: <0.1 KUA/L (ref ?–0.1)
SESAME SEED IGE QN: <0.1 KUA/L (ref ?–0.1)
SHRIMP IGE QN: <0.1 KUA/L (ref ?–0.1)
SOYBEAN IGE QN: <0.1 KUA/L (ref ?–0.1)
TTG IGA SER-ACNC: 0.6 U/ML (ref ?–7)
WALNUT IGE QN: <0.1 KUA/L (ref ?–0.1)
WHEAT IGE QN: <0.1 KUA/L (ref ?–0.1)

## 2022-08-24 ENCOUNTER — PATIENT MESSAGE (OUTPATIENT)
Dept: INTERNAL MEDICINE CLINIC | Facility: CLINIC | Age: 32
End: 2022-08-24

## 2022-08-29 RX ORDER — TOPIRAMATE 50 MG/1
50 TABLET, FILM COATED ORAL DAILY
Qty: 90 TABLET | Refills: 0 | Status: SHIPPED | OUTPATIENT
Start: 2022-08-29

## 2022-08-29 NOTE — TELEPHONE ENCOUNTER
I would advised taking topamax 50mg all at once instead of BID. She can take it at night/evening so that way she is not drowsy during the day. That dose is acceptable as once daily dosing. We can send topamax 50mg tablet once daily so that she doesn't take 2 tablets instead. I agree witht he appt with Regina Martin. She is to call and make appt. If the appt is to far out then we can see what else we can provide her but she will need an appt as well as it would be a new med.  We can squeeze her in for that

## 2022-09-08 ENCOUNTER — PATIENT MESSAGE (OUTPATIENT)
Dept: INTERNAL MEDICINE CLINIC | Facility: CLINIC | Age: 32
End: 2022-09-08

## 2022-09-13 RX ORDER — SEMAGLUTIDE 1.34 MG/ML
0.25 INJECTION, SOLUTION SUBCUTANEOUS
Qty: 1 PEN | Refills: 0 | Status: SHIPPED | OUTPATIENT
Start: 2022-09-13 | End: 2022-10-11

## 2022-09-13 RX ORDER — PEN NEEDLE, DIABETIC 30 GX3/16"
1 NEEDLE, DISPOSABLE MISCELLANEOUS
Qty: 30 EACH | Refills: 0 | Status: SHIPPED | OUTPATIENT
Start: 2022-09-13

## 2022-09-13 NOTE — TELEPHONE ENCOUNTER
I sent a 1 month trial.  Which weight loss doctor is she seeing and when? I do not see any upcoming scheduled appts.

## 2022-09-26 RX ORDER — SEMAGLUTIDE 1.34 MG/ML
0.25 INJECTION, SOLUTION SUBCUTANEOUS
Qty: 1.5 ML | Refills: 0 | OUTPATIENT
Start: 2022-09-26 | End: 2022-10-24

## 2022-09-26 RX ORDER — PEN NEEDLE, DIABETIC 30 GX3/16"
1 NEEDLE, DISPOSABLE MISCELLANEOUS
Qty: 30 EACH | Refills: 0 | OUTPATIENT
Start: 2022-09-26

## 2022-11-06 ENCOUNTER — PATIENT MESSAGE (OUTPATIENT)
Dept: INTERNAL MEDICINE CLINIC | Facility: CLINIC | Age: 32
End: 2022-11-06

## 2022-11-06 DIAGNOSIS — R10.11 RIGHT UPPER QUADRANT ABDOMINAL PAIN: ICD-10-CM

## 2022-11-06 DIAGNOSIS — Z87.19 HX OF CHOLELITHIASIS: Primary | ICD-10-CM

## 2022-11-07 NOTE — TELEPHONE ENCOUNTER
Pt messaging re: Recurring RUQ abd pain she characterizes as a \"gallbladder attack\" and requesting a referral tp see a specialist for perceived gallbladder issues. Pt is obese and has documented Hx of cholelithiasis that complicated her recent pregnancy. Returned call to patient. Unable to reach. Left vmail to proceed to Immediate care for worsening abd pain for evaluation. Informed that Dr Evi Saucedo is out of the office on vacation and no open appt slots this week for OV. Per pt request. Referral placed for GI consult and pt given PH# to schedule appt.

## 2022-11-07 NOTE — TELEPHONE ENCOUNTER
From: Adelso Delarosa  To: Kelvin Acosta MD  Sent: 11/6/2022 9:23 PM CST  Subject: Gallbladder attacks     Hello,  Hope you find yourself well Gee Rebeca,    I'm getting gallbladder attacks, or what I believe are gallbladder attacks more often, today I had very tight crushing feeling all around my stomach and radiated up to my chest and back and both left and right flanks too, this attack was the worse I've had and it put me into tears, It was intermittent all day and I was afraid to eat. and well I wanted a referral to a specialist that can help me with this problem or possibly removing my gallbladder, its been worse after delivery of my babygirl. You had mentioned in my previous visit to let you know if this continued to inform you. Thank you so much and I hope you can help me.        Keri Kraft

## 2022-12-27 ENCOUNTER — PATIENT MESSAGE (OUTPATIENT)
Dept: INTERNAL MEDICINE CLINIC | Facility: CLINIC | Age: 32
End: 2022-12-27

## 2023-01-04 RX ORDER — DULAGLUTIDE 0.75 MG/.5ML
0.75 INJECTION, SOLUTION SUBCUTANEOUS
Qty: 2 ML | Refills: 0 | Status: SHIPPED | OUTPATIENT
Start: 2023-01-04

## 2023-01-05 NOTE — TELEPHONE ENCOUNTER
From: Ying Hsu  To: Malvin Pimentel MD  Sent: 12/27/2022 10:28 PM CST  Subject: Injection     Hello,  I believe my insurance did not cover the prescription for the injections that were sent over to my pharmacy for the 2nd time. I understand they were a generic version. I will see the weight loss MD until February, is there anything else you can give me in the interim, I have gained +/-12 lbs since I last saw you and have been under a lot of stress studying for my certifications and my eating has been all outta wacks. Thank you for your help.          Mary Jane Moody

## 2023-01-11 ENCOUNTER — PATIENT MESSAGE (OUTPATIENT)
Dept: INTERNAL MEDICINE CLINIC | Facility: CLINIC | Age: 33
End: 2023-01-11

## 2023-01-14 NOTE — TELEPHONE ENCOUNTER
Prior Auth form faxed to [employer] ([phone]) for Trulicity Rx obesity Dx E66.9  Will await payer response. Pt notified via [email or web address] that PA was submitted.

## 2023-01-18 ENCOUNTER — TELEPHONE (OUTPATIENT)
Facility: CLINIC | Age: 33
End: 2023-01-18

## 2023-01-18 ENCOUNTER — OFFICE VISIT (OUTPATIENT)
Facility: CLINIC | Age: 33
End: 2023-01-18

## 2023-01-18 VITALS
BODY MASS INDEX: 35.19 KG/M2 | HEART RATE: 103 BPM | SYSTOLIC BLOOD PRESSURE: 136 MMHG | WEIGHT: 186.38 LBS | HEIGHT: 61.22 IN | DIASTOLIC BLOOD PRESSURE: 86 MMHG

## 2023-01-18 DIAGNOSIS — K21.9 GASTROESOPHAGEAL REFLUX DISEASE, UNSPECIFIED WHETHER ESOPHAGITIS PRESENT: Primary | ICD-10-CM

## 2023-01-18 DIAGNOSIS — R10.9 ABDOMINAL PAIN, UNSPECIFIED ABDOMINAL LOCATION: Primary | ICD-10-CM

## 2023-01-18 PROCEDURE — 99244 OFF/OP CNSLTJ NEW/EST MOD 40: CPT | Performed by: STUDENT IN AN ORGANIZED HEALTH CARE EDUCATION/TRAINING PROGRAM

## 2023-01-18 PROCEDURE — 3075F SYST BP GE 130 - 139MM HG: CPT | Performed by: STUDENT IN AN ORGANIZED HEALTH CARE EDUCATION/TRAINING PROGRAM

## 2023-01-18 PROCEDURE — 3008F BODY MASS INDEX DOCD: CPT | Performed by: STUDENT IN AN ORGANIZED HEALTH CARE EDUCATION/TRAINING PROGRAM

## 2023-01-18 PROCEDURE — 3079F DIAST BP 80-89 MM HG: CPT | Performed by: STUDENT IN AN ORGANIZED HEALTH CARE EDUCATION/TRAINING PROGRAM

## 2023-01-18 RX ORDER — OMEPRAZOLE 40 MG/1
40 CAPSULE, DELAYED RELEASE ORAL DAILY
Qty: 30 CAPSULE | Refills: 11 | Status: SHIPPED | OUTPATIENT
Start: 2023-01-18

## 2023-01-18 NOTE — TELEPHONE ENCOUNTER
Scheduled for:  Kasia Guerrero   Provider Name:  Dagmar Litten  Date:  2/9/23  Location:  [facility]  Sedation:  Mac  Time:0730 Am   (pt is aware that [facility] will call the day before to confirm arrival time)     Prep:Egd -Npo 3 hrs before prior to procedure  Prep instructions were given to pt in the office, pt verbalized understanding. Meds/Allergies Reconciled?:  Physician reviewed     Diagnosis with codes:  Abdominal pain R10.9  Was patient informed to call insurance with codes (Y/N):  Yes, I confirmed [place] insurance with the patient. The patient also verbally understands to call her insurance to check for pre-cert, codes were given on prep instructions. Referral sent?: Yes   [facility] or [facility] notified?:Electronic case request was sent to [facility] via [employer]. Medication Orders: This patient verbally confirmed thatshe is not taking:   Iron, blood thinners, BP meds, and is not diabetic - Trulicity not currently taking due to not covered by [employer] . Informed patient to hold her Topiramate for 7 days prior to procedure ,not currently taking this also due to insurance not covered   Not latex allergy, Not PCN allergy and does not have a pacemaker Pt is aware to NOT take iron pills, herbal meds and diet supplements for 7 days before exam. Also to NOT take any form of alcohol, recreational drugs and any forms of ED meds 24 hours before exam.    Misc Orders:  Patient was informed that they will need a COVID 19 test prior to their procedure. Patient verbally understood & will await a phone call from [facility] to schedule.       Further instructions given by staff:

## 2023-01-18 NOTE — PATIENT INSTRUCTIONS
1) Start Omeprazole 40mg daily -- take 30 minutes before a meal.    2) Get ultrasound of the gallbladder. 3) Schedule EGD with MAC (diagnosis: abdominal pain).     4) I will refer you to a surgeon for evaluation once other workup is completed or if symptoms recur

## 2023-02-06 ENCOUNTER — LAB REQUISITION (OUTPATIENT)
Dept: SURGERY | Age: 33
End: 2023-02-06
Payer: COMMERCIAL

## 2023-02-06 DIAGNOSIS — Z01.818 PREOP EXAMINATION: ICD-10-CM

## 2023-02-08 LAB — SARS-COV-2 RNA RESP QL NAA+PROBE: NOT DETECTED

## 2023-02-09 ENCOUNTER — LAB REQUISITION (OUTPATIENT)
Dept: SURGERY | Age: 33
End: 2023-02-09
Payer: COMMERCIAL

## 2023-02-09 ENCOUNTER — LAB ENCOUNTER (OUTPATIENT)
Dept: LAB | Facility: HOSPITAL | Age: 33
End: 2023-02-09
Attending: STUDENT IN AN ORGANIZED HEALTH CARE EDUCATION/TRAINING PROGRAM
Payer: COMMERCIAL

## 2023-02-09 ENCOUNTER — OFFICE VISIT (OUTPATIENT)
Dept: SURGERY | Facility: CLINIC | Age: 33
End: 2023-02-09
Payer: COMMERCIAL

## 2023-02-09 ENCOUNTER — SURGERY CENTER DOCUMENTATION (OUTPATIENT)
Dept: SURGERY | Age: 33
End: 2023-02-09

## 2023-02-09 ENCOUNTER — OFFICE VISIT (OUTPATIENT)
Dept: PODIATRY CLINIC | Facility: CLINIC | Age: 33
End: 2023-02-09

## 2023-02-09 VITALS
DIASTOLIC BLOOD PRESSURE: 71 MMHG | HEIGHT: 61.4 IN | OXYGEN SATURATION: 100 % | SYSTOLIC BLOOD PRESSURE: 111 MMHG | BODY MASS INDEX: 34.64 KG/M2 | HEART RATE: 91 BPM | WEIGHT: 185.88 LBS

## 2023-02-09 DIAGNOSIS — B35.1 ONYCHOMYCOSIS: ICD-10-CM

## 2023-02-09 DIAGNOSIS — E66.9 OBESITY (BMI 30-39.9): ICD-10-CM

## 2023-02-09 DIAGNOSIS — R63.2 BINGE EATING: Primary | ICD-10-CM

## 2023-02-09 DIAGNOSIS — B35.1 ONYCHOMYCOSIS: Primary | ICD-10-CM

## 2023-02-09 DIAGNOSIS — K21.9 GERD (GASTROESOPHAGEAL REFLUX DISEASE): ICD-10-CM

## 2023-02-09 DIAGNOSIS — R10.9 ABDOMINAL PAIN: ICD-10-CM

## 2023-02-09 DIAGNOSIS — F43.9 STRESS: ICD-10-CM

## 2023-02-09 DIAGNOSIS — R63.5 WEIGHT GAIN: ICD-10-CM

## 2023-02-09 LAB
ALBUMIN SERPL-MCNC: 3.7 G/DL (ref 3.4–5)
ALP LIVER SERPL-CCNC: 92 U/L
ALT SERPL-CCNC: 24 U/L
AST SERPL-CCNC: 19 U/L (ref 15–37)
BILIRUB DIRECT SERPL-MCNC: <0.1 MG/DL (ref 0–0.2)
BILIRUB SERPL-MCNC: 0.3 MG/DL (ref 0.1–2)
PROT SERPL-MCNC: 7.5 G/DL (ref 6.4–8.2)

## 2023-02-09 PROCEDURE — 99213 OFFICE O/P EST LOW 20 MIN: CPT | Performed by: STUDENT IN AN ORGANIZED HEALTH CARE EDUCATION/TRAINING PROGRAM

## 2023-02-09 PROCEDURE — 80076 HEPATIC FUNCTION PANEL: CPT

## 2023-02-09 PROCEDURE — 36415 COLL VENOUS BLD VENIPUNCTURE: CPT

## 2023-02-09 PROCEDURE — 88312 SPECIAL STAINS GROUP 1: CPT | Performed by: STUDENT IN AN ORGANIZED HEALTH CARE EDUCATION/TRAINING PROGRAM

## 2023-02-09 PROCEDURE — 99204 OFFICE O/P NEW MOD 45 MIN: CPT | Performed by: INTERNAL MEDICINE

## 2023-02-09 PROCEDURE — 3008F BODY MASS INDEX DOCD: CPT | Performed by: INTERNAL MEDICINE

## 2023-02-09 PROCEDURE — 3078F DIAST BP <80 MM HG: CPT | Performed by: INTERNAL MEDICINE

## 2023-02-09 PROCEDURE — 3074F SYST BP LT 130 MM HG: CPT | Performed by: INTERNAL MEDICINE

## 2023-02-09 PROCEDURE — 43239 EGD BIOPSY SINGLE/MULTIPLE: CPT | Performed by: STUDENT IN AN ORGANIZED HEALTH CARE EDUCATION/TRAINING PROGRAM

## 2023-02-09 PROCEDURE — 88305 TISSUE EXAM BY PATHOLOGIST: CPT | Performed by: STUDENT IN AN ORGANIZED HEALTH CARE EDUCATION/TRAINING PROGRAM

## 2023-02-09 RX ORDER — AMOXICILLIN 250 MG
CAPSULE ORAL
COMMUNITY

## 2023-02-09 RX ORDER — PHENTERMINE HYDROCHLORIDE 30 MG/1
30 CAPSULE ORAL EVERY MORNING
Qty: 30 CAPSULE | Refills: 2 | Status: SHIPPED | OUTPATIENT
Start: 2023-02-09

## 2023-02-09 NOTE — PROCEDURES
ESOPHAGOGASTRODUODENOSCOPY (EGD) [address] [facility]     1990 Age 28year old   PCP Gutierrez Almazan MD Endoscopist Loyda Castillo MD       Date of procedure: 23    Location: [facility]    Procedure: EGD w/ duodenal and gastric biopsies    Pre-operative diagnosis: abdominal pain    Post-operative diagnosis: gastritis, hiatal hernia    Medications: MAC    Complications: none    Procedure:  Informed consent was obtained from the patient after the risks of the procedure were discussed, including but not limited to bleeding, perforation, aspiration, infection, or possibility of a missed lesion. After discussions of the risks/benefits and alternatives to this procedure, as well as the planned sedation, the patient was placed in the left lateral decubitus position and begun on continuous blood pressure pulse oximetry and EKG monitoring and this was maintained throughout the procedure. Once an adequate level of sedation was obtained a bite block was placed. Then the lubricated tip of the [id number] diagnostic video upper endoscope was inserted and advanced using direct visualization into the posterior pharynx and ultimately into the esophagus. The scope was then advanced through the stomach and into the duodenum the distal extent reached of the second portion of the duodenum. Complications: None    Findings:      1. Esophagus: The squamocolumnar junction was noted at 35 cm and appeared regular. The diaphragmatic pinch was noted noted at 37 cm from the incisors. 2 cm hiatal hernia. The esophageal mucosa appeared healthy and normal. There was no endoscopic findings of esophagitis, stricture or Blanco's esophagus. 2. Stomach: The stomach distended normally. Normal rugal folds were seen. The pylorus was patent. Retroflexion revealed a normal fundus. The gastric mucosa appeared mildly erythematous in the fundus and gastric body.  Biopsies were taken with a cold forceps from the antrum, incisura, body, fundus for histology to rule out h. pylori. 3. Duodenum: The duodenal mucosa appeared normal in the bulb and 2nd portion of the duodenum. Biopsies were taken with a cold forceps for histology. Impression:  1. Normal appearing esophagus, no evidence of esophagitis. 2. Small hiatal hernia, mucosa within the hernia appeared normal.  3. Mild gastritis, biopsied to rule out H. Pylori. 4. Normal duodenum. Biopsied. Recommend:  1. Await pathology. 2. Continue twice per daily omeprazole 40 mg before meals. (before breakfast and dinner)  3. Get RUQ US done. 4. Pending the above workup will refer to surgery for evaluation for cholecystectomy if symptoms of biliary colic return.    >>>If tissue was sampled/removed and you have not received your pathology results either by phone or letter within 2 weeks, please call our office at [phone].     Specimens:  Gastric, duodenum    Blood loss: <1 ml

## 2023-02-10 ENCOUNTER — TELEPHONE (OUTPATIENT)
Facility: CLINIC | Age: 33
End: 2023-02-10

## 2023-02-10 ENCOUNTER — MED REC SCAN ONLY (OUTPATIENT)
Facility: CLINIC | Age: 33
End: 2023-02-10

## 2023-02-10 DIAGNOSIS — K29.70 HELICOBACTER PYLORI GASTRITIS: Primary | ICD-10-CM

## 2023-02-10 DIAGNOSIS — B96.81 HELICOBACTER PYLORI GASTRITIS: Primary | ICD-10-CM

## 2023-02-10 RX ORDER — CLARITHROMYCIN 500 MG/1
500 TABLET, COATED ORAL 2 TIMES DAILY
Qty: 28 TABLET | Refills: 0 | Status: SHIPPED | OUTPATIENT
Start: 2023-02-10 | End: 2023-02-24

## 2023-02-10 RX ORDER — AMOXICILLIN 500 MG/1
1000 TABLET, FILM COATED ORAL 2 TIMES DAILY
Qty: 56 TABLET | Refills: 0 | Status: SHIPPED | OUTPATIENT
Start: 2023-02-10 | End: 2023-02-24

## 2023-02-10 NOTE — TELEPHONE ENCOUNTER
EGD biopsies reviewed w/patient. H.pylori gastritis identified. The natural history of H.pylori was reviewed with the patient, as well as possible complications from H.pylori including stomach cancer, gastritis, dyspepsia, anemia and ulcers. We discussed treatment options and rationale for treatment, as well as testing for eradication. The patient understands the risks/benefits/side-effects of treatment and wants to proceed. The patient understands only 80-95% of patients have eradication to initial course of abx, and therefore may need another course of abx if fails first treatment. There may be side-effects during treatment and patient should call if any problems. I have instructed patient to check stool H.pylori antigen in 6 to 8 weeks to ensure eradication (order placed). Will start w/ triple therapy (PPI + Amoxicillin + Clarithromycin) x 14 days. Will then need to hold PPI therapy prior to eradication testing. Patient expressed understanding.

## 2023-02-13 DIAGNOSIS — R63.2 BINGE EATING: Primary | ICD-10-CM

## 2023-02-14 ENCOUNTER — APPOINTMENT (OUTPATIENT)
Dept: ULTRASOUND IMAGING | Facility: HOSPITAL | Age: 33
End: 2023-02-14
Attending: EMERGENCY MEDICINE
Payer: COMMERCIAL

## 2023-02-14 ENCOUNTER — APPOINTMENT (OUTPATIENT)
Dept: MRI IMAGING | Facility: HOSPITAL | Age: 33
End: 2023-02-14
Attending: EMERGENCY MEDICINE
Payer: COMMERCIAL

## 2023-02-14 ENCOUNTER — TELEPHONE (OUTPATIENT)
Dept: SURGERY | Facility: CLINIC | Age: 33
End: 2023-02-14

## 2023-02-14 ENCOUNTER — HOSPITAL ENCOUNTER (INPATIENT)
Facility: HOSPITAL | Age: 33
LOS: 3 days | Discharge: HOME | End: 2023-02-17
Attending: EMERGENCY MEDICINE | Admitting: FAMILY MEDICINE
Payer: COMMERCIAL

## 2023-02-14 DIAGNOSIS — K81.9 CHOLECYSTITIS: ICD-10-CM

## 2023-02-14 DIAGNOSIS — K80.50 CHOLEDOCHOLITHIASIS: Primary | ICD-10-CM

## 2023-02-14 LAB
ALBUMIN SERPL-MCNC: 3.8 G/DL (ref 3.4–5)
ALP LIVER SERPL-CCNC: 111 U/L
ALT SERPL-CCNC: 743 U/L
ANION GAP SERPL CALC-SCNC: 4 MMOL/L (ref 0–18)
AST SERPL-CCNC: 993 U/L (ref 15–37)
B-HCG UR QL: NEGATIVE
BASOPHILS # BLD AUTO: 0.03 X10(3) UL (ref 0–0.2)
BASOPHILS NFR BLD AUTO: 0.5 %
BILIRUB DIRECT SERPL-MCNC: 0.4 MG/DL (ref 0–0.2)
BILIRUB SERPL-MCNC: 0.7 MG/DL (ref 0.1–2)
BILIRUB UR QL: NEGATIVE
BUN BLD-MCNC: 18 MG/DL (ref 7–18)
BUN/CREAT SERPL: 28.1 (ref 10–20)
CALCIUM BLD-MCNC: 9.2 MG/DL (ref 8.5–10.1)
CHLORIDE SERPL-SCNC: 110 MMOL/L (ref 98–112)
CLARITY UR: CLEAR
CO2 SERPL-SCNC: 26 MMOL/L (ref 21–32)
COLOR UR: YELLOW
CREAT BLD-MCNC: 0.64 MG/DL
DEPRECATED RDW RBC AUTO: 41.7 FL (ref 35.1–46.3)
EOSINOPHIL # BLD AUTO: 0.07 X10(3) UL (ref 0–0.7)
EOSINOPHIL NFR BLD AUTO: 1.1 %
ERYTHROCYTE [DISTWIDTH] IN BLOOD BY AUTOMATED COUNT: 13 % (ref 11–15)
GFR SERPLBLD BASED ON 1.73 SQ M-ARVRAT: 120 ML/MIN/1.73M2 (ref 60–?)
GLUCOSE BLD-MCNC: 103 MG/DL (ref 70–99)
GLUCOSE UR-MCNC: NEGATIVE MG/DL
HCT VFR BLD AUTO: 41.3 %
HGB BLD-MCNC: 13.6 G/DL
IMM GRANULOCYTES # BLD AUTO: 0.01 X10(3) UL (ref 0–1)
IMM GRANULOCYTES NFR BLD: 0.2 %
KETONES UR-MCNC: NEGATIVE MG/DL
LACTATE SERPL-SCNC: 0.8 MMOL/L (ref 0.4–2)
LEUKOCYTE ESTERASE UR QL STRIP.AUTO: NEGATIVE
LIPASE SERPL-CCNC: 155 U/L (ref 73–393)
LIPASE SERPL-CCNC: 39 U/L (ref 13–75)
LYMPHOCYTES # BLD AUTO: 1.81 X10(3) UL (ref 1–4)
LYMPHOCYTES NFR BLD AUTO: 27.6 %
MCH RBC QN AUTO: 29 PG (ref 26–34)
MCHC RBC AUTO-ENTMCNC: 32.9 G/DL (ref 31–37)
MCV RBC AUTO: 88.1 FL
MONOCYTES # BLD AUTO: 0.69 X10(3) UL (ref 0.1–1)
MONOCYTES NFR BLD AUTO: 10.5 %
NEUTROPHILS # BLD AUTO: 3.95 X10 (3) UL (ref 1.5–7.7)
NEUTROPHILS # BLD AUTO: 3.95 X10(3) UL (ref 1.5–7.7)
NEUTROPHILS NFR BLD AUTO: 60.1 %
NITRITE UR QL STRIP.AUTO: NEGATIVE
OSMOLALITY SERPL CALC.SUM OF ELEC: 292 MOSM/KG (ref 275–295)
PH UR: 7 [PH] (ref 5–8)
PLATELET # BLD AUTO: 347 10(3)UL (ref 150–450)
POTASSIUM SERPL-SCNC: 3.9 MMOL/L (ref 3.5–5.1)
PROT SERPL-MCNC: 7.8 G/DL (ref 6.4–8.2)
RBC # BLD AUTO: 4.69 X10(6)UL
SARS-COV-2 RNA RESP QL NAA+PROBE: NOT DETECTED
SODIUM SERPL-SCNC: 140 MMOL/L (ref 136–145)
SP GR UR STRIP: 1.02 (ref 1–1.03)
UROBILINOGEN UR STRIP-ACNC: 1
WBC # BLD AUTO: 6.6 X10(3) UL (ref 4–11)

## 2023-02-14 PROCEDURE — 99255 IP/OBS CONSLTJ NEW/EST HI 80: CPT | Performed by: INTERNAL MEDICINE

## 2023-02-14 PROCEDURE — 74181 MRI ABDOMEN W/O CONTRAST: CPT | Performed by: EMERGENCY MEDICINE

## 2023-02-14 PROCEDURE — 76705 ECHO EXAM OF ABDOMEN: CPT | Performed by: EMERGENCY MEDICINE

## 2023-02-14 PROCEDURE — 76376 3D RENDER W/INTRP POSTPROCES: CPT | Performed by: EMERGENCY MEDICINE

## 2023-02-14 PROCEDURE — 99222 1ST HOSP IP/OBS MODERATE 55: CPT | Performed by: HOSPITALIST

## 2023-02-14 RX ORDER — SODIUM CHLORIDE, SODIUM LACTATE, POTASSIUM CHLORIDE, CALCIUM CHLORIDE 600; 310; 30; 20 MG/100ML; MG/100ML; MG/100ML; MG/100ML
INJECTION, SOLUTION INTRAVENOUS ONCE
Status: COMPLETED | OUTPATIENT
Start: 2023-02-14 | End: 2023-02-14

## 2023-02-14 RX ORDER — MORPHINE SULFATE 2 MG/ML
2 INJECTION, SOLUTION INTRAMUSCULAR; INTRAVENOUS EVERY 2 HOUR PRN
Status: DISCONTINUED | OUTPATIENT
Start: 2023-02-14 | End: 2023-02-17

## 2023-02-14 RX ORDER — SODIUM CHLORIDE, SODIUM LACTATE, POTASSIUM CHLORIDE, CALCIUM CHLORIDE 600; 310; 30; 20 MG/100ML; MG/100ML; MG/100ML; MG/100ML
INJECTION, SOLUTION INTRAVENOUS CONTINUOUS
Status: DISCONTINUED | OUTPATIENT
Start: 2023-02-14 | End: 2023-02-14

## 2023-02-14 RX ORDER — ONDANSETRON 2 MG/ML
4 INJECTION INTRAMUSCULAR; INTRAVENOUS EVERY 6 HOURS PRN
Status: DISCONTINUED | OUTPATIENT
Start: 2023-02-14 | End: 2023-02-15

## 2023-02-14 RX ORDER — PROCHLORPERAZINE EDISYLATE 5 MG/ML
5 INJECTION INTRAMUSCULAR; INTRAVENOUS EVERY 8 HOURS PRN
Status: DISCONTINUED | OUTPATIENT
Start: 2023-02-14 | End: 2023-02-17

## 2023-02-14 RX ORDER — MORPHINE SULFATE 4 MG/ML
4 INJECTION, SOLUTION INTRAMUSCULAR; INTRAVENOUS EVERY 2 HOUR PRN
Status: DISCONTINUED | OUTPATIENT
Start: 2023-02-14 | End: 2023-02-17

## 2023-02-14 RX ORDER — ONDANSETRON 2 MG/ML
4 INJECTION INTRAMUSCULAR; INTRAVENOUS EVERY 6 HOURS PRN
Status: DISCONTINUED | OUTPATIENT
Start: 2023-02-14 | End: 2023-02-17

## 2023-02-14 RX ORDER — ONDANSETRON 2 MG/ML
4 INJECTION INTRAMUSCULAR; INTRAVENOUS ONCE
Status: COMPLETED | OUTPATIENT
Start: 2023-02-14 | End: 2023-02-14

## 2023-02-14 RX ORDER — SODIUM CHLORIDE 9 MG/ML
INJECTION, SOLUTION INTRAVENOUS CONTINUOUS
Status: DISCONTINUED | OUTPATIENT
Start: 2023-02-14 | End: 2023-02-17

## 2023-02-14 RX ORDER — MORPHINE SULFATE 4 MG/ML
4 INJECTION, SOLUTION INTRAMUSCULAR; INTRAVENOUS ONCE
Status: COMPLETED | OUTPATIENT
Start: 2023-02-14 | End: 2023-02-14

## 2023-02-14 RX ORDER — ACETAMINOPHEN 500 MG
500 TABLET ORAL EVERY 4 HOURS PRN
Status: DISCONTINUED | OUTPATIENT
Start: 2023-02-14 | End: 2023-02-17

## 2023-02-14 RX ORDER — MORPHINE SULFATE 2 MG/ML
1 INJECTION, SOLUTION INTRAMUSCULAR; INTRAVENOUS EVERY 2 HOUR PRN
Status: DISCONTINUED | OUTPATIENT
Start: 2023-02-14 | End: 2023-02-17

## 2023-02-14 NOTE — PROGRESS NOTES
Received from ED. MRI results noted. Tolerating clear liquid diet, NPO after midnight. IV zosyn infusing per MD order. Zofran provided for nausea. Tylenol provided for pain. Plan is ERCP tomorrow. General surgery on consult.

## 2023-02-14 NOTE — ED INITIAL ASSESSMENT (HPI)
Patient c/c of gallstones, dx in 12/2022 during pregnancy, c/c +colic pain, Took Motrin at midnight, 1 gram tylenol at 5am no relief. 1-2x emesis, +nausea.

## 2023-02-14 NOTE — ED QUICK NOTES
Orders for admission, patient is aware of plan and ready to go upstairs. Any questions, please call ED RN Fred Simeon at extension [phone]. Patient Covid vaccination status: Fully vaccinated     COVID Test Ordered in ED: Rapid SARS-CoV-2 by PCR    COVID Suspicion at Admission: N/A    Running Infusions:      Mental Status/LOC at time of transport: a/o x 4    Other pertinent information: Has known gallstones, presenting with worsening LUQ pain.  Elevated LFT's. NPO.   CIWA score: N/A   NIH score:  N/A

## 2023-02-15 ENCOUNTER — ANESTHESIA (OUTPATIENT)
Dept: ENDOSCOPY | Facility: HOSPITAL | Age: 33
End: 2023-02-15
Payer: COMMERCIAL

## 2023-02-15 ENCOUNTER — ANESTHESIA EVENT (OUTPATIENT)
Dept: ENDOSCOPY | Facility: HOSPITAL | Age: 33
End: 2023-02-15
Payer: COMMERCIAL

## 2023-02-15 ENCOUNTER — APPOINTMENT (OUTPATIENT)
Dept: GENERAL RADIOLOGY | Facility: HOSPITAL | Age: 33
End: 2023-02-15
Attending: INTERNAL MEDICINE
Payer: COMMERCIAL

## 2023-02-15 LAB
ALBUMIN SERPL-MCNC: 2.9 G/DL (ref 3.4–5)
ALBUMIN/GLOB SERPL: 0.8 {RATIO} (ref 1–2)
ALP LIVER SERPL-CCNC: 92 U/L
ALT SERPL-CCNC: 705 U/L
ANION GAP SERPL CALC-SCNC: 9 MMOL/L (ref 0–18)
AST SERPL-CCNC: 330 U/L (ref 15–37)
BASOPHILS # BLD AUTO: 0.03 X10(3) UL (ref 0–0.2)
BASOPHILS NFR BLD AUTO: 0.5 %
BILIRUB SERPL-MCNC: 0.5 MG/DL (ref 0.1–2)
BUN BLD-MCNC: 8 MG/DL (ref 7–18)
BUN/CREAT SERPL: 12.5 (ref 10–20)
CALCIUM BLD-MCNC: 8.4 MG/DL (ref 8.5–10.1)
CHLORIDE SERPL-SCNC: 111 MMOL/L (ref 98–112)
CO2 SERPL-SCNC: 23 MMOL/L (ref 21–32)
CREAT BLD-MCNC: 0.64 MG/DL
DEPRECATED RDW RBC AUTO: 42.7 FL (ref 35.1–46.3)
EOSINOPHIL # BLD AUTO: 0.24 X10(3) UL (ref 0–0.7)
EOSINOPHIL NFR BLD AUTO: 4.3 %
ERYTHROCYTE [DISTWIDTH] IN BLOOD BY AUTOMATED COUNT: 13.2 % (ref 11–15)
GFR SERPLBLD BASED ON 1.73 SQ M-ARVRAT: 120 ML/MIN/1.73M2 (ref 60–?)
GLOBULIN PLAS-MCNC: 3.5 G/DL (ref 2.8–4.4)
GLUCOSE BLD-MCNC: 90 MG/DL (ref 70–99)
HCT VFR BLD AUTO: 35.9 %
HGB BLD-MCNC: 11.8 G/DL
IMM GRANULOCYTES # BLD AUTO: 0.02 X10(3) UL (ref 0–1)
IMM GRANULOCYTES NFR BLD: 0.4 %
LYMPHOCYTES # BLD AUTO: 2.08 X10(3) UL (ref 1–4)
LYMPHOCYTES NFR BLD AUTO: 37.5 %
MCH RBC QN AUTO: 28.9 PG (ref 26–34)
MCHC RBC AUTO-ENTMCNC: 32.9 G/DL (ref 31–37)
MCV RBC AUTO: 88 FL
MONOCYTES # BLD AUTO: 0.44 X10(3) UL (ref 0.1–1)
MONOCYTES NFR BLD AUTO: 7.9 %
NEUTROPHILS # BLD AUTO: 2.73 X10 (3) UL (ref 1.5–7.7)
NEUTROPHILS # BLD AUTO: 2.73 X10(3) UL (ref 1.5–7.7)
NEUTROPHILS NFR BLD AUTO: 49.4 %
OSMOLALITY SERPL CALC.SUM OF ELEC: 294 MOSM/KG (ref 275–295)
PLATELET # BLD AUTO: 257 10(3)UL (ref 150–450)
POTASSIUM SERPL-SCNC: 3.9 MMOL/L (ref 3.5–5.1)
PROT SERPL-MCNC: 6.4 G/DL (ref 6.4–8.2)
RBC # BLD AUTO: 4.08 X10(6)UL
SODIUM SERPL-SCNC: 143 MMOL/L (ref 136–145)
WBC # BLD AUTO: 5.5 X10(3) UL (ref 4–11)

## 2023-02-15 PROCEDURE — 43264 ERCP REMOVE DUCT CALCULI: CPT | Performed by: INTERNAL MEDICINE

## 2023-02-15 PROCEDURE — 43274 ERCP DUCT STENT PLACEMENT: CPT | Performed by: INTERNAL MEDICINE

## 2023-02-15 PROCEDURE — 0F798DZ DILATION OF COMMON BILE DUCT WITH INTRALUMINAL DEVICE, VIA NATURAL OR ARTIFICIAL OPENING ENDOSCOPIC: ICD-10-PCS | Performed by: INTERNAL MEDICINE

## 2023-02-15 PROCEDURE — 99233 SBSQ HOSP IP/OBS HIGH 50: CPT | Performed by: HOSPITALIST

## 2023-02-15 PROCEDURE — 0FC98ZZ EXTIRPATION OF MATTER FROM COMMON BILE DUCT, VIA NATURAL OR ARTIFICIAL OPENING ENDOSCOPIC: ICD-10-PCS | Performed by: INTERNAL MEDICINE

## 2023-02-15 PROCEDURE — BF111ZZ FLUOROSCOPY OF BILIARY AND PANCREATIC DUCTS USING LOW OSMOLAR CONTRAST: ICD-10-PCS | Performed by: INTERNAL MEDICINE

## 2023-02-15 PROCEDURE — 74328 X-RAY BILE DUCT ENDOSCOPY: CPT | Performed by: INTERNAL MEDICINE

## 2023-02-15 DEVICE — BILIARY STENT WITH NAVIFLEXTM RX DELIVERY SYSTEM
Type: IMPLANTABLE DEVICE | Status: FUNCTIONAL
Brand: ADVANIX™ BILIARY

## 2023-02-15 RX ORDER — ESMOLOL HYDROCHLORIDE 10 MG/ML
INJECTION INTRAVENOUS AS NEEDED
Status: DISCONTINUED | OUTPATIENT
Start: 2023-02-15 | End: 2023-02-15 | Stop reason: SURG

## 2023-02-15 RX ORDER — HYDROMORPHONE HYDROCHLORIDE 1 MG/ML
0.2 INJECTION, SOLUTION INTRAMUSCULAR; INTRAVENOUS; SUBCUTANEOUS EVERY 5 MIN PRN
Status: DISCONTINUED | OUTPATIENT
Start: 2023-02-15 | End: 2023-02-15

## 2023-02-15 RX ORDER — DEXAMETHASONE SODIUM PHOSPHATE 4 MG/ML
VIAL (ML) INJECTION AS NEEDED
Status: DISCONTINUED | OUTPATIENT
Start: 2023-02-15 | End: 2023-02-15 | Stop reason: SURG

## 2023-02-15 RX ORDER — LIDOCAINE HYDROCHLORIDE 10 MG/ML
INJECTION, SOLUTION EPIDURAL; INFILTRATION; INTRACAUDAL; PERINEURAL AS NEEDED
Status: DISCONTINUED | OUTPATIENT
Start: 2023-02-15 | End: 2023-02-15 | Stop reason: SURG

## 2023-02-15 RX ORDER — HYDROMORPHONE HYDROCHLORIDE 1 MG/ML
0.6 INJECTION, SOLUTION INTRAMUSCULAR; INTRAVENOUS; SUBCUTANEOUS EVERY 5 MIN PRN
Status: DISCONTINUED | OUTPATIENT
Start: 2023-02-15 | End: 2023-02-15 | Stop reason: HOSPADM

## 2023-02-15 RX ORDER — SODIUM CHLORIDE, SODIUM LACTATE, POTASSIUM CHLORIDE, CALCIUM CHLORIDE 600; 310; 30; 20 MG/100ML; MG/100ML; MG/100ML; MG/100ML
INJECTION, SOLUTION INTRAVENOUS CONTINUOUS PRN
Status: DISCONTINUED | OUTPATIENT
Start: 2023-02-15 | End: 2023-02-15 | Stop reason: SURG

## 2023-02-15 RX ORDER — SODIUM CHLORIDE, SODIUM LACTATE, POTASSIUM CHLORIDE, CALCIUM CHLORIDE 600; 310; 30; 20 MG/100ML; MG/100ML; MG/100ML; MG/100ML
INJECTION, SOLUTION INTRAVENOUS CONTINUOUS
Status: DISCONTINUED | OUTPATIENT
Start: 2023-02-15 | End: 2023-02-15 | Stop reason: HOSPADM

## 2023-02-15 RX ORDER — MORPHINE SULFATE 4 MG/ML
2 INJECTION, SOLUTION INTRAMUSCULAR; INTRAVENOUS EVERY 10 MIN PRN
Status: DISCONTINUED | OUTPATIENT
Start: 2023-02-15 | End: 2023-02-15 | Stop reason: HOSPADM

## 2023-02-15 RX ORDER — ONDANSETRON 2 MG/ML
INJECTION INTRAMUSCULAR; INTRAVENOUS AS NEEDED
Status: DISCONTINUED | OUTPATIENT
Start: 2023-02-15 | End: 2023-02-15 | Stop reason: SURG

## 2023-02-15 RX ORDER — HYDROMORPHONE HYDROCHLORIDE 1 MG/ML
0.4 INJECTION, SOLUTION INTRAMUSCULAR; INTRAVENOUS; SUBCUTANEOUS EVERY 5 MIN PRN
Status: DISCONTINUED | OUTPATIENT
Start: 2023-02-15 | End: 2023-02-15 | Stop reason: HOSPADM

## 2023-02-15 RX ORDER — ROCURONIUM BROMIDE 10 MG/ML
INJECTION, SOLUTION INTRAVENOUS AS NEEDED
Status: DISCONTINUED | OUTPATIENT
Start: 2023-02-15 | End: 2023-02-15 | Stop reason: SURG

## 2023-02-15 RX ORDER — MORPHINE SULFATE 10 MG/ML
6 INJECTION, SOLUTION INTRAMUSCULAR; INTRAVENOUS EVERY 10 MIN PRN
Status: DISCONTINUED | OUTPATIENT
Start: 2023-02-15 | End: 2023-02-15 | Stop reason: HOSPADM

## 2023-02-15 RX ORDER — HYDROMORPHONE HYDROCHLORIDE 1 MG/ML
0.5 INJECTION, SOLUTION INTRAMUSCULAR; INTRAVENOUS; SUBCUTANEOUS EVERY 5 MIN PRN
Status: DISCONTINUED | OUTPATIENT
Start: 2023-02-15 | End: 2023-02-15 | Stop reason: HOSPADM

## 2023-02-15 RX ORDER — NALOXONE HYDROCHLORIDE 0.4 MG/ML
80 INJECTION, SOLUTION INTRAMUSCULAR; INTRAVENOUS; SUBCUTANEOUS AS NEEDED
Status: DISCONTINUED | OUTPATIENT
Start: 2023-02-15 | End: 2023-02-15 | Stop reason: HOSPADM

## 2023-02-15 RX ORDER — MORPHINE SULFATE 4 MG/ML
4 INJECTION, SOLUTION INTRAMUSCULAR; INTRAVENOUS EVERY 10 MIN PRN
Status: DISCONTINUED | OUTPATIENT
Start: 2023-02-15 | End: 2023-02-15 | Stop reason: HOSPADM

## 2023-02-15 RX ADMIN — DEXAMETHASONE SODIUM PHOSPHATE 4 MG: 4 MG/ML VIAL (ML) INJECTION at 13:57:00

## 2023-02-15 RX ADMIN — ONDANSETRON 4 MG: 2 INJECTION INTRAMUSCULAR; INTRAVENOUS at 13:57:00

## 2023-02-15 RX ADMIN — SODIUM CHLORIDE, SODIUM LACTATE, POTASSIUM CHLORIDE, CALCIUM CHLORIDE: 600; 310; 30; 20 INJECTION, SOLUTION INTRAVENOUS at 13:55:00

## 2023-02-15 RX ADMIN — LIDOCAINE HYDROCHLORIDE 50 MG: 10 INJECTION, SOLUTION EPIDURAL; INFILTRATION; INTRACAUDAL; PERINEURAL at 13:57:00

## 2023-02-15 RX ADMIN — ROCURONIUM BROMIDE 50 MG: 10 INJECTION, SOLUTION INTRAVENOUS at 13:57:00

## 2023-02-15 RX ADMIN — SODIUM CHLORIDE, SODIUM LACTATE, POTASSIUM CHLORIDE, CALCIUM CHLORIDE: 600; 310; 30; 20 INJECTION, SOLUTION INTRAVENOUS at 14:32:00

## 2023-02-15 RX ADMIN — ESMOLOL HYDROCHLORIDE 30 MG: 10 INJECTION INTRAVENOUS at 14:18:00

## 2023-02-15 NOTE — PROGRESS NOTES
Patient alert and oriented. Room air. Zosyn now completed. 0.9 infusing. Consents signed and in chart. Pt denies pain and nausea this AM. Ready for procedure.

## 2023-02-15 NOTE — CONSULTS
Probable recurrent biliary colic or acute cholecystitis, + choledocholithiasis. ERCP anticipated, empiric abx, timing of lap choley TBD.

## 2023-02-15 NOTE — ANESTHESIA PROCEDURE NOTES
Airway  Date/Time: 2/15/2023 2:02 PM  Urgency: Elective    Airway not difficult    General Information and Staff    Patient location during procedure: OR  Resident/CRNA: Shante Boyle CRNA  Performed: CRNA   Performed by: Shante Boyle CRNA  Authorized by: Shante Boyle CRNA      Indications and Patient Condition  Indications for airway management: anesthesia  Sedation level: deep  Preoxygenated: yes  Patient position: sniffing  Mask difficulty assessment: 1 - vent by mask    Final Airway Details  Final airway type: endotracheal airway      Successful airway: ETT  Cuffed: yes   Successful intubation technique: direct laryngoscopy  Endotracheal tube insertion site: oral  Blade: Dell  Blade size: #3  ETT size (mm): 7.0    Cormack-Lehane Classification: grade I - full view of glottis  Placement verified by: chest auscultation and capnometry   Measured from: teeth  Number of attempts at approach: 1    Additional Comments  ATRAUAMATIC / [id number] [facility],[address]

## 2023-02-15 NOTE — PROGRESS NOTES
Brief GI Progress Note:    Patient seen after extubation and in recovery. Having epigastric pain. Abdomen is soft, mildly tender, no rebound, guarding. Possibly procedure related pain or post-ERCP pancreatitis. Discussed with patient her procedure and post procedure plan for monitoring, IV fluids, and pain medication at this time. Discussed with anesthesia and endoscopy/PACU recovery     Discussed with her spouse procedure over the phone. Param Adler MD  [address] - Gastroenterology  2/15/2023  2:54 PM    Seen again in PACU/endo recovery feeling better. Will continue with plan above. If continues to feel better, can start clear liquid diet.     Param Adler MD  [address] - Gastroenterology  2/15/2023  3:02 PM

## 2023-02-16 ENCOUNTER — TELEPHONE (OUTPATIENT)
Facility: CLINIC | Age: 33
End: 2023-02-16

## 2023-02-16 ENCOUNTER — ANESTHESIA (OUTPATIENT)
Dept: SURGERY | Facility: HOSPITAL | Age: 33
End: 2023-02-16
Payer: COMMERCIAL

## 2023-02-16 ENCOUNTER — ANESTHESIA EVENT (OUTPATIENT)
Dept: SURGERY | Facility: HOSPITAL | Age: 33
End: 2023-02-16
Payer: COMMERCIAL

## 2023-02-16 DIAGNOSIS — Z46.89 ENCOUNTER FOR REMOVAL OF BILIARY STENT: Primary | ICD-10-CM

## 2023-02-16 LAB
ALBUMIN SERPL-MCNC: 3.2 G/DL (ref 3.4–5)
ALP LIVER SERPL-CCNC: 86 U/L
ALT SERPL-CCNC: 448 U/L
ANION GAP SERPL CALC-SCNC: 7 MMOL/L (ref 0–18)
AST SERPL-CCNC: 112 U/L (ref 15–37)
B-HCG UR QL: NEGATIVE
BASOPHILS # BLD AUTO: 0.01 X10(3) UL (ref 0–0.2)
BASOPHILS NFR BLD AUTO: 0.1 %
BILIRUB DIRECT SERPL-MCNC: 0.1 MG/DL (ref 0–0.2)
BILIRUB SERPL-MCNC: 0.4 MG/DL (ref 0.1–2)
BUN BLD-MCNC: 10 MG/DL (ref 7–18)
BUN/CREAT SERPL: 16.7 (ref 10–20)
CALCIUM BLD-MCNC: 8.5 MG/DL (ref 8.5–10.1)
CHLORIDE SERPL-SCNC: 113 MMOL/L (ref 98–112)
CO2 SERPL-SCNC: 21 MMOL/L (ref 21–32)
CREAT BLD-MCNC: 0.6 MG/DL
DEPRECATED RDW RBC AUTO: 40 FL (ref 35.1–46.3)
EOSINOPHIL # BLD AUTO: 0.01 X10(3) UL (ref 0–0.7)
EOSINOPHIL NFR BLD AUTO: 0.1 %
ERYTHROCYTE [DISTWIDTH] IN BLOOD BY AUTOMATED COUNT: 12.5 % (ref 11–15)
GFR SERPLBLD BASED ON 1.73 SQ M-ARVRAT: 122 ML/MIN/1.73M2 (ref 60–?)
GLUCOSE BLD-MCNC: 89 MG/DL (ref 70–99)
HCT VFR BLD AUTO: 35 %
HGB BLD-MCNC: 11.5 G/DL
IMM GRANULOCYTES # BLD AUTO: 0.03 X10(3) UL (ref 0–1)
IMM GRANULOCYTES NFR BLD: 0.4 %
LIPASE SERPL-CCNC: 303 U/L (ref 73–393)
LIPASE SERPL-CCNC: 75 U/L (ref 13–75)
LYMPHOCYTES # BLD AUTO: 1.33 X10(3) UL (ref 1–4)
LYMPHOCYTES NFR BLD AUTO: 17.7 %
MCH RBC QN AUTO: 28.8 PG (ref 26–34)
MCHC RBC AUTO-ENTMCNC: 32.9 G/DL (ref 31–37)
MCV RBC AUTO: 87.5 FL
MONOCYTES # BLD AUTO: 0.36 X10(3) UL (ref 0.1–1)
MONOCYTES NFR BLD AUTO: 4.8 %
NEUTROPHILS # BLD AUTO: 5.77 X10 (3) UL (ref 1.5–7.7)
NEUTROPHILS # BLD AUTO: 5.77 X10(3) UL (ref 1.5–7.7)
NEUTROPHILS NFR BLD AUTO: 76.9 %
OSMOLALITY SERPL CALC.SUM OF ELEC: 291 MOSM/KG (ref 275–295)
PLATELET # BLD AUTO: 261 10(3)UL (ref 150–450)
POTASSIUM SERPL-SCNC: 4 MMOL/L (ref 3.5–5.1)
PROT SERPL-MCNC: 6.4 G/DL (ref 6.4–8.2)
RBC # BLD AUTO: 4 X10(6)UL
SODIUM SERPL-SCNC: 141 MMOL/L (ref 136–145)
WBC # BLD AUTO: 7.5 X10(3) UL (ref 4–11)

## 2023-02-16 PROCEDURE — 99233 SBSQ HOSP IP/OBS HIGH 50: CPT | Performed by: HOSPITALIST

## 2023-02-16 PROCEDURE — 0FT44ZZ RESECTION OF GALLBLADDER, PERCUTANEOUS ENDOSCOPIC APPROACH: ICD-10-PCS | Performed by: SURGERY

## 2023-02-16 PROCEDURE — 8E0W4CZ ROBOTIC ASSISTED PROCEDURE OF TRUNK REGION, PERCUTANEOUS ENDOSCOPIC APPROACH: ICD-10-PCS | Performed by: SURGERY

## 2023-02-16 RX ORDER — MIDAZOLAM HYDROCHLORIDE 1 MG/ML
INJECTION INTRAMUSCULAR; INTRAVENOUS AS NEEDED
Status: DISCONTINUED | OUTPATIENT
Start: 2023-02-16 | End: 2023-02-16 | Stop reason: SURG

## 2023-02-16 RX ORDER — ROCURONIUM BROMIDE 10 MG/ML
INJECTION, SOLUTION INTRAVENOUS AS NEEDED
Status: DISCONTINUED | OUTPATIENT
Start: 2023-02-16 | End: 2023-02-16 | Stop reason: SURG

## 2023-02-16 RX ORDER — HYDROMORPHONE HYDROCHLORIDE 1 MG/ML
0.2 INJECTION, SOLUTION INTRAMUSCULAR; INTRAVENOUS; SUBCUTANEOUS EVERY 5 MIN PRN
Status: DISCONTINUED | OUTPATIENT
Start: 2023-02-16 | End: 2023-02-16 | Stop reason: HOSPADM

## 2023-02-16 RX ORDER — OXYCODONE HYDROCHLORIDE 5 MG/1
10 TABLET ORAL EVERY 4 HOURS PRN
Status: DISCONTINUED | OUTPATIENT
Start: 2023-02-16 | End: 2023-02-17

## 2023-02-16 RX ORDER — OXYCODONE HYDROCHLORIDE 5 MG/1
5 TABLET ORAL EVERY 6 HOURS PRN
Qty: 6 TABLET | Refills: 0 | Status: SHIPPED | OUTPATIENT
Start: 2023-02-16

## 2023-02-16 RX ORDER — KETOROLAC TROMETHAMINE 30 MG/ML
INJECTION, SOLUTION INTRAMUSCULAR; INTRAVENOUS AS NEEDED
Status: DISCONTINUED | OUTPATIENT
Start: 2023-02-16 | End: 2023-02-16 | Stop reason: SURG

## 2023-02-16 RX ORDER — SODIUM CHLORIDE, SODIUM LACTATE, POTASSIUM CHLORIDE, CALCIUM CHLORIDE 600; 310; 30; 20 MG/100ML; MG/100ML; MG/100ML; MG/100ML
INJECTION, SOLUTION INTRAVENOUS CONTINUOUS
Status: DISCONTINUED | OUTPATIENT
Start: 2023-02-16 | End: 2023-02-16 | Stop reason: HOSPADM

## 2023-02-16 RX ORDER — BUPIVACAINE HYDROCHLORIDE AND EPINEPHRINE 5; 5 MG/ML; UG/ML
INJECTION, SOLUTION PERINEURAL AS NEEDED
Status: DISCONTINUED | OUTPATIENT
Start: 2023-02-16 | End: 2023-02-16 | Stop reason: HOSPADM

## 2023-02-16 RX ORDER — MORPHINE SULFATE 4 MG/ML
2 INJECTION, SOLUTION INTRAMUSCULAR; INTRAVENOUS EVERY 10 MIN PRN
Status: DISCONTINUED | OUTPATIENT
Start: 2023-02-16 | End: 2023-02-16 | Stop reason: HOSPADM

## 2023-02-16 RX ORDER — SODIUM CHLORIDE, SODIUM LACTATE, POTASSIUM CHLORIDE, CALCIUM CHLORIDE 600; 310; 30; 20 MG/100ML; MG/100ML; MG/100ML; MG/100ML
INJECTION, SOLUTION INTRAVENOUS CONTINUOUS
Status: DISCONTINUED | OUTPATIENT
Start: 2023-02-16 | End: 2023-02-16

## 2023-02-16 RX ORDER — MORPHINE SULFATE 10 MG/ML
6 INJECTION, SOLUTION INTRAMUSCULAR; INTRAVENOUS EVERY 10 MIN PRN
Status: DISCONTINUED | OUTPATIENT
Start: 2023-02-16 | End: 2023-02-16 | Stop reason: HOSPADM

## 2023-02-16 RX ORDER — INDOCYANINE GREEN AND WATER 25 MG
5 KIT INJECTION ONCE
Status: COMPLETED | OUTPATIENT
Start: 2023-02-16 | End: 2023-02-16

## 2023-02-16 RX ORDER — TRAMADOL HYDROCHLORIDE 50 MG/1
50 TABLET ORAL EVERY 6 HOURS PRN
Qty: 10 TABLET | Refills: 0 | Status: SHIPPED | OUTPATIENT
Start: 2023-02-16

## 2023-02-16 RX ORDER — MORPHINE SULFATE 4 MG/ML
4 INJECTION, SOLUTION INTRAMUSCULAR; INTRAVENOUS EVERY 10 MIN PRN
Status: DISCONTINUED | OUTPATIENT
Start: 2023-02-16 | End: 2023-02-16 | Stop reason: HOSPADM

## 2023-02-16 RX ORDER — ONDANSETRON 2 MG/ML
4 INJECTION INTRAMUSCULAR; INTRAVENOUS EVERY 6 HOURS PRN
Status: DISCONTINUED | OUTPATIENT
Start: 2023-02-16 | End: 2023-02-17

## 2023-02-16 RX ORDER — OXYCODONE HYDROCHLORIDE 5 MG/1
5 TABLET ORAL EVERY 4 HOURS PRN
Status: DISCONTINUED | OUTPATIENT
Start: 2023-02-16 | End: 2023-02-17

## 2023-02-16 RX ORDER — PROCHLORPERAZINE EDISYLATE 5 MG/ML
5 INJECTION INTRAMUSCULAR; INTRAVENOUS EVERY 8 HOURS PRN
Status: DISCONTINUED | OUTPATIENT
Start: 2023-02-16 | End: 2023-02-17

## 2023-02-16 RX ORDER — ONDANSETRON 2 MG/ML
4 INJECTION INTRAMUSCULAR; INTRAVENOUS EVERY 6 HOURS PRN
Status: DISCONTINUED | OUTPATIENT
Start: 2023-02-16 | End: 2023-02-16 | Stop reason: HOSPADM

## 2023-02-16 RX ORDER — SENNOSIDES 8.6 MG
8.6 TABLET ORAL 2 TIMES DAILY
Status: DISCONTINUED | OUTPATIENT
Start: 2023-02-17 | End: 2023-02-17

## 2023-02-16 RX ORDER — ONDANSETRON 2 MG/ML
INJECTION INTRAMUSCULAR; INTRAVENOUS AS NEEDED
Status: DISCONTINUED | OUTPATIENT
Start: 2023-02-16 | End: 2023-02-16 | Stop reason: SURG

## 2023-02-16 RX ORDER — HYDROMORPHONE HYDROCHLORIDE 1 MG/ML
0.4 INJECTION, SOLUTION INTRAMUSCULAR; INTRAVENOUS; SUBCUTANEOUS EVERY 2 HOUR PRN
Status: DISCONTINUED | OUTPATIENT
Start: 2023-02-16 | End: 2023-02-17

## 2023-02-16 RX ORDER — NALOXONE HYDROCHLORIDE 0.4 MG/ML
80 INJECTION, SOLUTION INTRAMUSCULAR; INTRAVENOUS; SUBCUTANEOUS AS NEEDED
Status: DISCONTINUED | OUTPATIENT
Start: 2023-02-16 | End: 2023-02-16 | Stop reason: HOSPADM

## 2023-02-16 RX ORDER — DEXAMETHASONE SODIUM PHOSPHATE 4 MG/ML
VIAL (ML) INJECTION AS NEEDED
Status: DISCONTINUED | OUTPATIENT
Start: 2023-02-16 | End: 2023-02-16 | Stop reason: SURG

## 2023-02-16 RX ORDER — HYDROMORPHONE HYDROCHLORIDE 1 MG/ML
0.6 INJECTION, SOLUTION INTRAMUSCULAR; INTRAVENOUS; SUBCUTANEOUS EVERY 5 MIN PRN
Status: DISCONTINUED | OUTPATIENT
Start: 2023-02-16 | End: 2023-02-16 | Stop reason: HOSPADM

## 2023-02-16 RX ORDER — HYDROMORPHONE HYDROCHLORIDE 1 MG/ML
0.4 INJECTION, SOLUTION INTRAMUSCULAR; INTRAVENOUS; SUBCUTANEOUS EVERY 5 MIN PRN
Status: DISCONTINUED | OUTPATIENT
Start: 2023-02-16 | End: 2023-02-16 | Stop reason: HOSPADM

## 2023-02-16 RX ORDER — ACETAMINOPHEN 500 MG
1000 TABLET ORAL EVERY 8 HOURS SCHEDULED
Status: DISCONTINUED | OUTPATIENT
Start: 2023-02-16 | End: 2023-02-17

## 2023-02-16 RX ORDER — HYDROMORPHONE HYDROCHLORIDE 1 MG/ML
0.8 INJECTION, SOLUTION INTRAMUSCULAR; INTRAVENOUS; SUBCUTANEOUS EVERY 2 HOUR PRN
Status: DISCONTINUED | OUTPATIENT
Start: 2023-02-16 | End: 2023-02-17

## 2023-02-16 RX ORDER — PROCHLORPERAZINE EDISYLATE 5 MG/ML
5 INJECTION INTRAMUSCULAR; INTRAVENOUS EVERY 8 HOURS PRN
Status: DISCONTINUED | OUTPATIENT
Start: 2023-02-16 | End: 2023-02-16 | Stop reason: HOSPADM

## 2023-02-16 RX ORDER — LIDOCAINE HYDROCHLORIDE 10 MG/ML
INJECTION, SOLUTION EPIDURAL; INFILTRATION; INTRACAUDAL; PERINEURAL AS NEEDED
Status: DISCONTINUED | OUTPATIENT
Start: 2023-02-16 | End: 2023-02-16 | Stop reason: SURG

## 2023-02-16 RX ADMIN — ROCURONIUM BROMIDE 20 MG: 10 INJECTION, SOLUTION INTRAVENOUS at 14:21:00

## 2023-02-16 RX ADMIN — MIDAZOLAM HYDROCHLORIDE 2 MG: 1 INJECTION INTRAMUSCULAR; INTRAVENOUS at 13:11:00

## 2023-02-16 RX ADMIN — LIDOCAINE HYDROCHLORIDE 50 MG: 10 INJECTION, SOLUTION EPIDURAL; INFILTRATION; INTRACAUDAL; PERINEURAL at 13:14:00

## 2023-02-16 RX ADMIN — ONDANSETRON 4 MG: 2 INJECTION INTRAMUSCULAR; INTRAVENOUS at 14:52:00

## 2023-02-16 RX ADMIN — ROCURONIUM BROMIDE 20 MG: 10 INJECTION, SOLUTION INTRAVENOUS at 13:49:00

## 2023-02-16 RX ADMIN — DEXAMETHASONE SODIUM PHOSPHATE 4 MG: 4 MG/ML VIAL (ML) INJECTION at 13:14:00

## 2023-02-16 RX ADMIN — ROCURONIUM BROMIDE 50 MG: 10 INJECTION, SOLUTION INTRAVENOUS at 13:14:00

## 2023-02-16 RX ADMIN — SODIUM CHLORIDE: 9 INJECTION, SOLUTION INTRAVENOUS at 14:55:00

## 2023-02-16 RX ADMIN — SODIUM CHLORIDE: 9 INJECTION, SOLUTION INTRAVENOUS at 13:09:00

## 2023-02-16 RX ADMIN — KETOROLAC TROMETHAMINE 30 MG: 30 INJECTION, SOLUTION INTRAMUSCULAR; INTRAVENOUS at 14:52:00

## 2023-02-16 NOTE — ANESTHESIA PROCEDURE NOTES
Airway  Date/Time: 2/16/2023 1:16 PM  Urgency: Elective      General Information and Staff    Patient location during procedure: OR  Anesthesiologist: Terrie Tilley MD  Performed: anesthesiologist   Performed by: Terrie Tilley MD  Authorized by: Terrie Tilley MD      Indications and Patient Condition  Indications for airway management: anesthesia  Sedation level: deep  Preoxygenated: yes  Patient position: sniffing  Mask difficulty assessment: 1 - vent by mask    Final Airway Details  Final airway type: endotracheal airway      Successful airway: ETT  Cuffed: yes   Successful intubation technique: direct laryngoscopy  Endotracheal tube insertion site: oral  Blade: Dell  Blade size: #3  ETT size (mm): 6.5    Cormack-Lehane Classification: grade I - full view of glottis  Placement verified by: chest auscultation and capnometry   Inital cuff pressure (cm H2O): 6  Measured from: teeth  ETT to teeth (cm): 20  Number of attempts at approach: 1

## 2023-02-16 NOTE — PLAN OF CARE
Cont'd IV abx. Medicated for pain as needed. NPO for procedure today. Vitals stable. Problem: Patient Centered Care  Goal: Patient preferences are identified and integrated in the patient's plan of care  Description: Interventions:  - What would you like us to know as we care for you?  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, I am an RN  - Provide timely, complete, and accurate information to patient/family  - Incorporate patient and family knowledge, values, beliefs, and cultural backgrounds into the planning and delivery of care  - Encourage patient/family to participate in care and decision-making at the level they choose  - Honor patient and family perspectives and choices  Outcome: Progressing     Problem: PAIN - ADULT  Goal: Verbalizes/displays adequate comfort level or patient's stated pain goal  Description: INTERVENTIONS:  - Encourage pt to monitor pain and request assistance  - Assess pain using appropriate pain scale  - Administer analgesics based on type and severity of pain and evaluate response  - Implement non-pharmacological measures as appropriate and evaluate response  - Consider cultural and social influences on pain and pain management  - Manage/alleviate anxiety  - Utilize distraction and/or relaxation techniques  - Monitor for opioid side effects  - Notify MD/LIP if interventions unsuccessful or patient reports new pain  - Anticipate increased pain with activity and pre-medicate as appropriate  Outcome: Progressing     Problem: GASTROINTESTINAL - ADULT  Goal: Minimal or absence of nausea and vomiting  Description: INTERVENTIONS:  - Maintain adequate hydration with IV or PO as ordered and tolerated  - Nasogastric tube to low intermittent suction as ordered  - Evaluate effectiveness of ordered antiemetic medications  - Provide nonpharmacologic comfort measures as appropriate  - Advance diet as tolerated, if ordered  - Obtain nutritional consult as needed  - Evaluate fluid balance  Outcome: Progressing     Problem: METABOLIC/FLUID AND ELECTROLYTES - ADULT  Goal: Electrolytes maintained within normal limits  Description: INTERVENTIONS:  - Monitor labs and rhythm and assess patient for signs and symptoms of electrolyte imbalances  - Administer electrolyte replacement as ordered  - Monitor response to electrolyte replacements, including rhythm and repeat lab results as appropriate  - Fluid restriction as ordered  - Instruct patient on fluid and nutrition restrictions as appropriate  Outcome: Progressing     Problem: SKIN/TISSUE INTEGRITY - ADULT  Goal: Skin integrity remains intact  Description: INTERVENTIONS  - Assess and document risk factors for pressure ulcer development  - Assess and document skin integrity  - Monitor for areas of redness and/or skin breakdown  - Initiate interventions, skin care algorithm/standards of care as needed  Outcome: Progressing     Problem: SAFETY ADULT - FALL  Goal: Free from fall injury  Description: INTERVENTIONS:  - Assess pt frequently for physical needs  - Identify cognitive and physical deficits and behaviors that affect risk of falls.   - [place] fall precautions as indicated by assessment.  - Educate pt/family on patient safety including physical limitations  - Instruct pt to call for assistance with activity based on assessment  - Modify environment to reduce risk of injury  - Provide assistive devices as appropriate  - Consider OT/PT consult to assist with strengthening/mobility  - Encourage toileting schedule  Outcome: Progressing

## 2023-02-16 NOTE — PLAN OF CARE
Patient admitted to floor from endo post-ERCP. On clear liquid diet tolerating well. Moderate epigastric pain managed with PRN medications. Plan for possible justin tomorrow; NPO at midnight. Frequent rounding provided by nursing staff and safety precautions maintained. No acute change in status at this time. Problem: Patient Centered Care  Goal: Patient preferences are identified and integrated in the patient's plan of care  Description: Interventions:  - What would you like us to know as we care for you?  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, I am an RN  - Provide timely, complete, and accurate information to patient/family  - Incorporate patient and family knowledge, values, beliefs, and cultural backgrounds into the planning and delivery of care  - Encourage patient/family to participate in care and decision-making at the level they choose  - Honor patient and family perspectives and choices  Outcome: Progressing     Problem: Patient/Family Goals  Goal: Patient/Family Long Term Goal  Description: Patient's Long Term Goal: to go home    Interventions:  - Administer medications as ordered  - Encourage adequate nutrition and hydration  - Pain medications as ordered  - Multimodal pain relief  - Encourage ambulation  - Monitor vital signs  - See additional Care Plan goals for specific interventions  Outcome: Progressing  Goal: Patient/Family Short Term Goal  Description: Patient's Short Term Goal: to be free of pain    Interventions:   - Pain medications as ordered  - Multimodal pain relief  - Encourage ambulation  - Monitor vital signs  - See additional Care Plan goals for specific interventions  Outcome: Progressing

## 2023-02-16 NOTE — BRIEF OP NOTE
Pre-Operative Diagnosis: Cholecystitis [K81.9]     Post-Operative Diagnosis: Cholecystitis [K81.9]      Procedure Performed:   XI ROBOT-ASSISTED LAPAROSCOPIC CHOLECYSTECTOMY    Surgeon(s) and Role:     Denia Hogan MD - Primary    Assistant(s):  Surgical Assistant.: Beatriz Reese     Surgical Findings: same     Specimen: gb     Estimated Blood Loss: No data recorded    Dictation Number:  [id number]    Mariana Mata MD  2/16/2023  3:06 PM

## 2023-02-16 NOTE — TELEPHONE ENCOUNTER
GI staff:    Please call patient to schedule ERCP with general anesthesia (60 minute procedure) for biliary stent removal at the hospital in approximately 2-3 months time frame    Please ask her to hold phentermine and vyvanse 1 week prior    Thanks    Loida Weaver MD  [address] - Gastroenterology  2/16/2023  2:10 PM

## 2023-02-16 NOTE — CONSULTS
[facility]    PATIENT'S NAME: Fahad Kennedy   ATTENDING PHYSICIAN: Valery Patton MD   CONSULTING PHYSICIAN: [place] MD   PATIENT ACCOUNT#:   [id number]    LOCATION:  [address] [place] [address] RECORD #:   [id number]       YOB: 1990  ADMISSION DATE:       2023      CONSULT DATE:  02/15/2023    REPORT OF CONSULTATION      REASON FOR CONSULTATION:  Epigastric and left upper quadrant abdominal pain with nausea and vomiting, probable recurrent acute cholecystitis with choledocholithiasis. HISTORY OF PRESENT ILLNESS:  I was asked by Dr. Consuelo Rice to evaluate this 40-year-old woman on 02/15/2023. She sees Dr. Lucio Araiza as an outpatient. She was first diagnosed with cholelithiasis 4 years ago and had some related symptoms during her pregnancy. She has been trying to treat her gallstone disease with home remedies and supplements, but has had a few flare ups every year. Her last one was about 1 month ago. Four days ago, she began experiencing epigastric and substernal pain with nausea and vomiting. She had some chills but no fever. She denied any sick contacts or dietary changes. She has had some constipation, but no melena or hematochezia. No dysuria. Her menstrual periods are regular. PAST MEDICAL HISTORY:  GIFTY 3, Helicobacter pylori gastritis (). PAST SURGICAL HISTORY:   section (),  section (2021), LEEP procedure, upper endoscopy (2023). MEDICATIONS:  See chart. ALLERGIES:  Drug allergies:  None. SOCIAL HISTORY:  She is  and has 2 children and works as a nurse in an emergency department. No significant tobacco or alcohol use. FAMILY HISTORY:  Her mother and sisters were treated for gallbladder disease. REVIEW OF SYSTEMS:  Otherwise, negative. PHYSICAL EXAMINATION:    GENERAL:  Middle-aged female in mild distress. HEENT:  Her sclerae are nonicteric.   Mucous membranes moist.  NECK:  Supple without lymphadenopathy. LUNGS:  Clear. HEART:  Regular. ABDOMEN:  Protuberant but soft.  scar is well healed. Umbilical piercing is well healed. She is mildly tender in the epigastric area. No masses or hernias or acute skin changes. EXTREMITIES:  Warm and dry without cyanosis or edema. NEUROLOGIC:  Grossly intact. LABORATORY DATA:  Glucose 103. Alkaline phosphatase 111, , , total bilirubin 0.7, lipase 39. CBC within normal limits. Ultrasound of the gallbladder showed multiple stones and a dilated common bile duct and fatty liver changes. MRI showed choledocholithiasis with bile duct dilatation and multiple stones in the common bile duct without MRI evidence of acute cholecystitis. Hepatic steatosis was noted. IMPRESSION:  A 58-year-old female with known gallstones and biliary colic-type symptoms over the years. She presented more recently with worsening symptoms of pain with nausea and vomiting. Abnormal liver function tests were noted and choledocholithiasis was confirmed. PLAN:  I discussed the findings today with the patient. We reviewed the pathophysiology of biliary tract disease as well as related complications and treatment options. ERCP is anticipated, and she is getting antibiotics and antiemetics. She should continue treatment for her Helicobacter pylori gastritis. We discussed the indications and timing for laparoscopic cholecystectomy. She understands the risks.   This could be done during this admission or after a short interval.     Dictated By Morenita Drummond MD  d: 02/15/2023 22:18:23  t: 02/15/2023 23:13:47  Job [id number]  [facility]/

## 2023-02-16 NOTE — ANESTHESIA POSTPROCEDURE EVALUATION
Patient: Gasper Staple    Procedure Summary     Date: 02/16/23 Room / Location: [address] [place] [address] MAIN OR 06 / [address] [place] [address] MAIN OR    Anesthesia Start: 4431 Anesthesia Stop: 2190    Procedure: XI ROBOT-ASSISTED LAPAROSCOPIC CHOLECYSTECTOMY (Abdomen) Diagnosis:       Cholecystitis      (Cholecystitis [K81.9])    Surgeons: Harsh Mcgee MD Anesthesiologist: Gypsy Jarvis MD    Anesthesia Type: general ASA Status: 2          Anesthesia Type: general    Vitals Value Taken Time   /46 02/16/23 1516   Temp 36.8 02/16/23 1516   Pulse 111 02/16/23 1516   Resp 14 02/16/23 1516   SpO2 100 02/16/23 1516       [facility] AN Post Evaluation:   Patient Evaluated in PACU  Patient Participation: waiting for patient participation  Level of Consciousness: sleepy but conscious  Pain Score: 0  Pain Management: adequate  Airway Patency:patent  Yes    Cardiovascular Status: acceptable and hemodynamically stable  Respiratory Status: acceptable, spontaneous ventilation, nonlabored ventilation and nasal cannula  Postoperative Hydration euvolemic      Arturo Rodriguez MD  2/16/2023 3:16 PM

## 2023-02-17 ENCOUNTER — PATIENT MESSAGE (OUTPATIENT)
Facility: CLINIC | Age: 33
End: 2023-02-17

## 2023-02-17 VITALS
HEART RATE: 74 BPM | WEIGHT: 184 LBS | RESPIRATION RATE: 18 BRPM | BODY MASS INDEX: 33.86 KG/M2 | TEMPERATURE: 98 F | SYSTOLIC BLOOD PRESSURE: 106 MMHG | HEIGHT: 62 IN | OXYGEN SATURATION: 99 % | DIASTOLIC BLOOD PRESSURE: 68 MMHG

## 2023-02-17 LAB
ALBUMIN SERPL-MCNC: 2.8 G/DL (ref 3.4–5)
ALBUMIN/GLOB SERPL: 0.9 {RATIO} (ref 1–2)
ALP LIVER SERPL-CCNC: 68 U/L
ALT SERPL-CCNC: 297 U/L
ANION GAP SERPL CALC-SCNC: 6 MMOL/L (ref 0–18)
AST SERPL-CCNC: 45 U/L (ref 15–37)
BASOPHILS # BLD AUTO: 0.03 X10(3) UL (ref 0–0.2)
BASOPHILS NFR BLD AUTO: 0.3 %
BILIRUB SERPL-MCNC: 0.2 MG/DL (ref 0.1–2)
BUN BLD-MCNC: 13 MG/DL (ref 7–18)
BUN/CREAT SERPL: 20 (ref 10–20)
CALCIUM BLD-MCNC: 8.2 MG/DL (ref 8.5–10.1)
CHLORIDE SERPL-SCNC: 112 MMOL/L (ref 98–112)
CO2 SERPL-SCNC: 23 MMOL/L (ref 21–32)
CREAT BLD-MCNC: 0.65 MG/DL
DEPRECATED RDW RBC AUTO: 40.8 FL (ref 35.1–46.3)
EOSINOPHIL # BLD AUTO: 0.04 X10(3) UL (ref 0–0.7)
EOSINOPHIL NFR BLD AUTO: 0.4 %
ERYTHROCYTE [DISTWIDTH] IN BLOOD BY AUTOMATED COUNT: 12.7 % (ref 11–15)
GFR SERPLBLD BASED ON 1.73 SQ M-ARVRAT: 120 ML/MIN/1.73M2 (ref 60–?)
GLOBULIN PLAS-MCNC: 3.1 G/DL (ref 2.8–4.4)
GLUCOSE BLD-MCNC: 103 MG/DL (ref 70–99)
HCT VFR BLD AUTO: 31.3 %
HGB BLD-MCNC: 10.5 G/DL
IMM GRANULOCYTES # BLD AUTO: 0.03 X10(3) UL (ref 0–1)
IMM GRANULOCYTES NFR BLD: 0.3 %
LIPASE SERPL-CCNC: 247 U/L (ref 73–393)
LIPASE SERPL-CCNC: 60 U/L (ref 13–75)
LYMPHOCYTES # BLD AUTO: 2.49 X10(3) UL (ref 1–4)
LYMPHOCYTES NFR BLD AUTO: 26.2 %
MCH RBC QN AUTO: 29.4 PG (ref 26–34)
MCHC RBC AUTO-ENTMCNC: 33.5 G/DL (ref 31–37)
MCV RBC AUTO: 87.7 FL
MONOCYTES # BLD AUTO: 0.69 X10(3) UL (ref 0.1–1)
MONOCYTES NFR BLD AUTO: 7.2 %
NEUTROPHILS # BLD AUTO: 6.24 X10 (3) UL (ref 1.5–7.7)
NEUTROPHILS # BLD AUTO: 6.24 X10(3) UL (ref 1.5–7.7)
NEUTROPHILS NFR BLD AUTO: 65.6 %
OSMOLALITY SERPL CALC.SUM OF ELEC: 292 MOSM/KG (ref 275–295)
PLATELET # BLD AUTO: 230 10(3)UL (ref 150–450)
POTASSIUM SERPL-SCNC: 3.7 MMOL/L (ref 3.5–5.1)
PROT SERPL-MCNC: 5.9 G/DL (ref 6.4–8.2)
RBC # BLD AUTO: 3.57 X10(6)UL
SODIUM SERPL-SCNC: 141 MMOL/L (ref 136–145)
WBC # BLD AUTO: 9.5 X10(3) UL (ref 4–11)

## 2023-02-17 PROCEDURE — 99239 HOSP IP/OBS DSCHRG MGMT >30: CPT | Performed by: HOSPITALIST

## 2023-02-17 NOTE — PLAN OF CARE
Patient s/p lap cholecystectomy POD 0. Up to bathroom with assistance, voiding well. Tolerating clear liquid diet with no nausea/vomiting. Post-op pain managed with PRN medications. Plan to discharge home tomorrow when tolerating diet and pain is managed. Frequent rounding provided by nursing staff. Safety precautions maintained. Pt updated on plan of care. Problem: Patient Centered Care  Goal: Patient preferences are identified and integrated in the patient's plan of care  Description: Interventions:  - What would you like us to know as we care for you?  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, I am an RN  - Provide timely, complete, and accurate information to patient/family  - Incorporate patient and family knowledge, values, beliefs, and cultural backgrounds into the planning and delivery of care  - Encourage patient/family to participate in care and decision-making at the level they choose  - Honor patient and family perspectives and choices  Outcome: Progressing     Problem: Patient/Family Goals  Goal: Patient/Family Long Term Goal  Description: Patient's Long Term Goal: to go home    Interventions:  - Administer medications as ordered  - Encourage adequate nutrition and hydration  - Pain medications as ordered  - Multimodal pain relief  - Encourage ambulation  - Monitor vital signs  - See additional Care Plan goals for specific interventions  Outcome: Progressing  Goal: Patient/Family Short Term Goal  Description: Patient's Short Term Goal: to be free of pain    Interventions:   - Pain medications as ordered  - Multimodal pain relief  - Encourage ambulation  - Monitor vital signs  - See additional Care Plan goals for specific interventions  Outcome: Progressing     Problem: PAIN - ADULT  Goal: Verbalizes/displays adequate comfort level or patient's stated pain goal  Description: INTERVENTIONS:  - Encourage pt to monitor pain and request assistance  - Assess pain using appropriate pain scale  - Administer analgesics based on type and severity of pain and evaluate response  - Implement non-pharmacological measures as appropriate and evaluate response  - Consider cultural and social influences on pain and pain management  - Manage/alleviate anxiety  - Utilize distraction and/or relaxation techniques  - Monitor for opioid side effects  - Notify MD/LIP if interventions unsuccessful or patient reports new pain  - Anticipate increased pain with activity and pre-medicate as appropriate  Outcome: Progressing     Problem: GASTROINTESTINAL - ADULT  Goal: Minimal or absence of nausea and vomiting  Description: INTERVENTIONS:  - Maintain adequate hydration with IV or PO as ordered and tolerated  - Nasogastric tube to low intermittent suction as ordered  - Evaluate effectiveness of ordered antiemetic medications  - Provide nonpharmacologic comfort measures as appropriate  - Advance diet as tolerated, if ordered  - Obtain nutritional consult as needed  - Evaluate fluid balance  Outcome: Progressing     Problem: METABOLIC/FLUID AND ELECTROLYTES - ADULT  Goal: Electrolytes maintained within normal limits  Description: INTERVENTIONS:  - Monitor labs and rhythm and assess patient for signs and symptoms of electrolyte imbalances  - Administer electrolyte replacement as ordered  - Monitor response to electrolyte replacements, including rhythm and repeat lab results as appropriate  - Fluid restriction as ordered  - Instruct patient on fluid and nutrition restrictions as appropriate  Outcome: Progressing     Problem: SKIN/TISSUE INTEGRITY - ADULT  Goal: Skin integrity remains intact  Description: INTERVENTIONS  - Assess and document risk factors for pressure ulcer development  - Assess and document skin integrity  - Monitor for areas of redness and/or skin breakdown  - Initiate interventions, skin care algorithm/standards of care as needed  Outcome: Progressing     Problem: SAFETY ADULT - FALL  Goal: Free from fall injury  Description: INTERVENTIONS:  - Assess pt frequently for physical needs  - Identify cognitive and physical deficits and behaviors that affect risk of falls.   - [place] fall precautions as indicated by assessment.  - Educate pt/family on patient safety including physical limitations  - Instruct pt to call for assistance with activity based on assessment  - Modify environment to reduce risk of injury  - Provide assistive devices as appropriate  - Consider OT/PT consult to assist with strengthening/mobility  - Encourage toileting schedule  Outcome: Progressing

## 2023-02-17 NOTE — PLAN OF CARE
Problem: Patient Centered Care  Goal: Patient preferences are identified and integrated in the patient's plan of care  Description: Interventions:  - What would you like us to know as we care for you?  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, I am an RN  - Provide timely, complete, and accurate information to patient/family  - Incorporate patient and family knowledge, values, beliefs, and cultural backgrounds into the planning and delivery of care  - Encourage patient/family to participate in care and decision-making at the level they choose  - Honor patient and family perspectives and choices  Outcome: Progressing     Problem: Patient/Family Goals  Goal: Patient/Family Long Term Goal  Description: Patient's Long Term Goal: to go home    Interventions:  - Administer medications as ordered  - Encourage adequate nutrition and hydration  - Pain medications as ordered  - Multimodal pain relief  - Encourage ambulation  - Monitor vital signs  - See additional Care Plan goals for specific interventions  Outcome: Progressing  Goal: Patient/Family Short Term Goal  Description: Patient's Short Term Goal: to be free of pain    Interventions:   - Pain medications as ordered  - Multimodal pain relief  - Encourage ambulation  - Monitor vital signs  - See additional Care Plan goals for specific interventions  Outcome: Progressing     Problem: PAIN - ADULT  Goal: Verbalizes/displays adequate comfort level or patient's stated pain goal  Description: INTERVENTIONS:  - Encourage pt to monitor pain and request assistance  - Assess pain using appropriate pain scale  - Administer analgesics based on type and severity of pain and evaluate response  - Implement non-pharmacological measures as appropriate and evaluate response  - Consider cultural and social influences on pain and pain management  - Manage/alleviate anxiety  - Utilize distraction and/or relaxation techniques  - Monitor for opioid side effects  - Notify MD/LIP if interventions unsuccessful or patient reports new pain  - Anticipate increased pain with activity and pre-medicate as appropriate  Outcome: Progressing     Problem: GASTROINTESTINAL - ADULT  Goal: Minimal or absence of nausea and vomiting  Description: INTERVENTIONS:  - Maintain adequate hydration with IV or PO as ordered and tolerated  - Nasogastric tube to low intermittent suction as ordered  - Evaluate effectiveness of ordered antiemetic medications  - Provide nonpharmacologic comfort measures as appropriate  - Advance diet as tolerated, if ordered  - Obtain nutritional consult as needed  - Evaluate fluid balance  Outcome: Progressing     Problem: METABOLIC/FLUID AND ELECTROLYTES - ADULT  Goal: Electrolytes maintained within normal limits  Description: INTERVENTIONS:  - Monitor labs and rhythm and assess patient for signs and symptoms of electrolyte imbalances  - Administer electrolyte replacement as ordered  - Monitor response to electrolyte replacements, including rhythm and repeat lab results as appropriate  - Fluid restriction as ordered  - Instruct patient on fluid and nutrition restrictions as appropriate  Outcome: Progressing     Problem: SKIN/TISSUE INTEGRITY - ADULT  Goal: Skin integrity remains intact  Description: INTERVENTIONS  - Assess and document risk factors for pressure ulcer development  - Assess and document skin integrity  - Monitor for areas of redness and/or skin breakdown  - Initiate interventions, skin care algorithm/standards of care as needed  Outcome: Progressing     Problem: SAFETY ADULT - FALL  Goal: Free from fall injury  Description: INTERVENTIONS:  - Assess pt frequently for physical needs  - Identify cognitive and physical deficits and behaviors that affect risk of falls.   - [place] fall precautions as indicated by assessment.  - Educate pt/family on patient safety including physical limitations  - Instruct pt to call for assistance with activity based on assessment  - Modify environment to reduce risk of injury  - Provide assistive devices as appropriate  - Consider OT/PT consult to assist with strengthening/mobility  - Encourage toileting schedule  Outcome: Progressing 2/17/23 [place]. Diet(general) on senokot, had bm today, surgical drsgs dry&intact, iv fluids, ambulatory, hoping to go home today.

## 2023-02-17 NOTE — OPERATIVE REPORT
[facility]    PATIENT'S NAME: Alonzo Ch   ATTENDING PHYSICIAN: Guido Jean MD   OPERATING PHYSICIAN: Radhames Richardson MD   PATIENT ACCOUNT#:   [id number]    LOCATION:  [address] [place] [address] RECORD #:   [id number]       YOB: 1990  ADMISSION DATE:       02/14/2023      OPERATION DATE:  02/16/2023    OPERATIVE REPORT      PREOPERATIVE DIAGNOSIS:  Chronic cholecystitis; choledocholithiasis, status post ERCP. POSTOPERATIVE DIAGNOSIS:  Chronic cholecystitis; choledocholithiasis, status post ERCP. PROCEDURE:  Robotically assisted laparoscopic cholecystectomy. ASSISTANT:  CELIO Finney     COMPLICATIONS:  None. ESTIMATED BLOOD LOSS:  5 mL. PATHOLOGY SPECIMENS:  Gallbladder. INDICATIONS:  This 80-year-old woman has been known to have gallstones and biliary colic symptoms for several years. She presented recently to the hospital with an acute flare-up and was also found to have choledocholithiasis. ERCP was done yesterday and was uneventful. She presents now for semi-elective cholecystectomy in hopes of avoiding similar episodes in the future. OPERATIVE TECHNIQUE:  With the patient in supine position, a general anesthetic was induced. The abdomen was prepped and draped in the usual aseptic fashion. Infraumbilical skin incision was made, and the Veress needle inserted through a small umbilical hernia. Its intra-abdominal position was confirmed and CO2 insufflation performed. The Veress needle was replaced with an 8 mm trocar. Eventually, 3 more 8 mm trocars were placed across the upper abdomen in the routine fashion under direct laparoscopic vision, and all trocar sites were infiltrated with Marcaine. Brief examination of the lower abdomen was unremarkable. There was a small amount of reddish ascites deep in the pelvis. Pelvic structures were not visualized.   The liver and stomach appeared normal.  The gallbladder was intrahepatic and mildly edematous. There appeared to be a fairly large gallstone impacted near the gallbladder neck. The robot was then docked, and the instruments advanced into the abdominal cavity under direct vision. From the console, dissection began by releasing the peritoneum around the triangle of Calot. The cystic duct was isolated and its junction with the gallbladder clearly delineated. The cystic duct appeared somewhat larger than normal and was tortuous. It was clipped and divided. A lymphatic vessel as well as the cystic artery were also divided between clips as the gallbladder was taken down from the liver in a retrograde fashion using electrocautery for hemostasis. It was placed into a specimen bag, removed through the umbilical port, and sent to Pathology. The right upper quadrant was irrigated and adequate hemostasis noted. Fascial closure needle was used to place 0 Vicryl suture at the umbilical trocar site. Skin edges were closed with 4-0 Vicryl suture. Steri-Strips, gauze, and Tegaderm dressings were applied. Overall, the patient tolerated the procedure well. She was extubated and taken to the recovery room in stable condition.      Dictated By Joni Dugan MD  d: 02/16/2023 17:35:17  t: 02/16/2023 21:09:07  Job [id number]  [facility]/

## 2023-02-17 NOTE — PLAN OF CARE
Advanced diet, tolerating well. PRN and scheduled pain meds. No acute changes overnight. Plan of discharge today. Vitals stable, continue to monitor. Problem: Patient Centered Care  Goal: Patient preferences are identified and integrated in the patient's plan of care  Description: Interventions:  - What would you like us to know as we care for you?  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, I am an RN  - Provide timely, complete, and accurate information to patient/family  - Incorporate patient and family knowledge, values, beliefs, and cultural backgrounds into the planning and delivery of care  - Encourage patient/family to participate in care and decision-making at the level they choose  - Honor patient and family perspectives and choices  Outcome: Progressing     Problem: PAIN - ADULT  Goal: Verbalizes/displays adequate comfort level or patient's stated pain goal  Description: INTERVENTIONS:  - Encourage pt to monitor pain and request assistance  - Assess pain using appropriate pain scale  - Administer analgesics based on type and severity of pain and evaluate response  - Implement non-pharmacological measures as appropriate and evaluate response  - Consider cultural and social influences on pain and pain management  - Manage/alleviate anxiety  - Utilize distraction and/or relaxation techniques  - Monitor for opioid side effects  - Notify MD/LIP if interventions unsuccessful or patient reports new pain  - Anticipate increased pain with activity and pre-medicate as appropriate  Outcome: Progressing     Problem: GASTROINTESTINAL - ADULT  Goal: Minimal or absence of nausea and vomiting  Description: INTERVENTIONS:  - Maintain adequate hydration with IV or PO as ordered and tolerated  - Nasogastric tube to low intermittent suction as ordered  - Evaluate effectiveness of ordered antiemetic medications  - Provide nonpharmacologic comfort measures as appropriate  - Advance diet as tolerated, if ordered  - Obtain nutritional consult as needed  - Evaluate fluid balance  Outcome: Progressing     Problem: METABOLIC/FLUID AND ELECTROLYTES - ADULT  Goal: Electrolytes maintained within normal limits  Description: INTERVENTIONS:  - Monitor labs and rhythm and assess patient for signs and symptoms of electrolyte imbalances  - Administer electrolyte replacement as ordered  - Monitor response to electrolyte replacements, including rhythm and repeat lab results as appropriate  - Fluid restriction as ordered  - Instruct patient on fluid and nutrition restrictions as appropriate  Outcome: Progressing     Problem: SKIN/TISSUE INTEGRITY - ADULT  Goal: Skin integrity remains intact  Description: INTERVENTIONS  - Assess and document risk factors for pressure ulcer development  - Assess and document skin integrity  - Monitor for areas of redness and/or skin breakdown  - Initiate interventions, skin care algorithm/standards of care as needed  Outcome: Progressing     Problem: SAFETY ADULT - FALL  Goal: Free from fall injury  Description: INTERVENTIONS:  - Assess pt frequently for physical needs  - Identify cognitive and physical deficits and behaviors that affect risk of falls.   - [place] fall precautions as indicated by assessment.  - Educate pt/family on patient safety including physical limitations  - Instruct pt to call for assistance with activity based on assessment  - Modify environment to reduce risk of injury  - Provide assistive devices as appropriate  - Consider OT/PT consult to assist with strengthening/mobility  - Encourage toileting schedule  Outcome: Progressing

## 2023-02-17 NOTE — PLAN OF CARE
Problem: Patient Centered Care  Goal: Patient preferences are identified and integrated in the patient's plan of care  Description: Interventions:  - What would you like us to know as we care for you?  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, I am an RN  - Provide timely, complete, and accurate information to patient/family  - Incorporate patient and family knowledge, values, beliefs, and cultural backgrounds into the planning and delivery of care  - Encourage patient/family to participate in care and decision-making at the level they choose  - Honor patient and family perspectives and choices  2/17/2023 1730 by Ghada Sanchez RN  Outcome: Completed  2/17/2023 0947 by Ghada Sanchez RN  Outcome: Progressing     Problem: Patient/Family Goals  Goal: Patient/Family Long Term Goal  Description: Patient's Long Term Goal: to go home    Interventions:  - Administer medications as ordered  - Encourage adequate nutrition and hydration  - Pain medications as ordered  - Multimodal pain relief  - Encourage ambulation  - Monitor vital signs  - See additional Care Plan goals for specific interventions  2/17/2023 1730 by Ghada Sanchez RN  Outcome: Completed  2/17/2023 0947 by Ghada Sanchez RN  Outcome: Progressing  Goal: Patient/Family Short Term Goal  Description: Patient's Short Term Goal: to be free of pain    Interventions:   - Pain medications as ordered  - Multimodal pain relief  - Encourage ambulation  - Monitor vital signs  - See additional Care Plan goals for specific interventions  2/17/2023 1730 by Ghada Sanchez RN  Outcome: Completed  2/17/2023 0947 by Ghada Sanchez RN  Outcome: Progressing     Problem: PAIN - ADULT  Goal: Verbalizes/displays adequate comfort level or patient's stated pain goal  Description: INTERVENTIONS:  - Encourage pt to monitor pain and request assistance  - Assess pain using appropriate pain scale  - Administer analgesics based on type and severity of pain and evaluate response  - Implement non-pharmacological measures as appropriate and evaluate response  - Consider cultural and social influences on pain and pain management  - Manage/alleviate anxiety  - Utilize distraction and/or relaxation techniques  - Monitor for opioid side effects  - Notify MD/LIP if interventions unsuccessful or patient reports new pain  - Anticipate increased pain with activity and pre-medicate as appropriate  2/17/2023 1730 by Vinny Guevara RN  Outcome: Completed  2/17/2023 0947 by Vinny Guevara RN  Outcome: Progressing     Problem: GASTROINTESTINAL - ADULT  Goal: Minimal or absence of nausea and vomiting  Description: INTERVENTIONS:  - Maintain adequate hydration with IV or PO as ordered and tolerated  - Nasogastric tube to low intermittent suction as ordered  - Evaluate effectiveness of ordered antiemetic medications  - Provide nonpharmacologic comfort measures as appropriate  - Advance diet as tolerated, if ordered  - Obtain nutritional consult as needed  - Evaluate fluid balance  2/17/2023 1730 by Vinny Guevara RN  Outcome: Completed  2/17/2023 0947 by Vinny Guevara RN  Outcome: Progressing     Problem: METABOLIC/FLUID AND ELECTROLYTES - ADULT  Goal: Electrolytes maintained within normal limits  Description: INTERVENTIONS:  - Monitor labs and rhythm and assess patient for signs and symptoms of electrolyte imbalances  - Administer electrolyte replacement as ordered  - Monitor response to electrolyte replacements, including rhythm and repeat lab results as appropriate  - Fluid restriction as ordered  - Instruct patient on fluid and nutrition restrictions as appropriate  2/17/2023 1730 by Vinny Guevara RN  Outcome: Completed  2/17/2023 0947 by Vinny Guevara RN  Outcome: Progressing     Problem: SKIN/TISSUE INTEGRITY - ADULT  Goal: Skin integrity remains intact  Description: INTERVENTIONS  - Assess and document risk factors for pressure ulcer development  - Assess and document skin integrity  - Monitor for areas of redness and/or skin breakdown  - Initiate interventions, skin care algorithm/standards of care as needed  2/17/2023 1730 by Bonita Schultz RN  Outcome: Completed  2/17/2023 0947 by Bonita Schultz RN  Outcome: Progressing     Problem: SAFETY ADULT - FALL  Goal: Free from fall injury  Description: INTERVENTIONS:  - Assess pt frequently for physical needs  - Identify cognitive and physical deficits and behaviors that affect risk of falls.   - [place] fall precautions as indicated by assessment.  - Educate pt/family on patient safety including physical limitations  - Instruct pt to call for assistance with activity based on assessment  - Modify environment to reduce risk of injury  - Provide assistive devices as appropriate  - Consider OT/PT consult to assist with strengthening/mobility  - Encourage toileting schedule  2/17/2023 1730 by Bonita Schultz RN  Outcome: Completed  2/17/2023 0947 by Bonita Schultz RN  Outcome: Progressing

## 2023-02-20 ENCOUNTER — PATIENT OUTREACH (OUTPATIENT)
Dept: CASE MANAGEMENT | Age: 33
End: 2023-02-20

## 2023-02-20 ENCOUNTER — TELEPHONE (OUTPATIENT)
Dept: INTERNAL MEDICINE CLINIC | Facility: CLINIC | Age: 33
End: 2023-02-20

## 2023-02-20 NOTE — TELEPHONE ENCOUNTER
Called patient she states she is following up with the surgeon and GI, she states she does not need to follow with Dr Amanda Farah at this time

## 2023-02-20 NOTE — PROGRESS NOTES
1st attempt General Surgery / Gastroenterology apt request (dc 02/17) - pt outreach sent from [facility]    Dr Simon Brandon[address]  [address] [id number] [phone]  Follow up 1 week  Apt made:   Mon 02/27 @10:30am    Dr Arlette Kuo  [facility] [address]  [address] [address]  [phone]  Follow up 2 months  Office will call the pt to schedule the procedure  Confirmed w/pt  Closing encounter

## 2023-02-20 NOTE — PROGRESS NOTES
The patient is requesting assistance scheduling with the following providers:   Schedule an appointment with Duncan Lubin MD as soon as  possible for a visit in 1 week(s)  Specialty: SURGERY, GENERAL  post hospital visit  [place] [facility]  [id number]  [address] [phone]    The patient is requesting an appointment for this Friday, 2/24/2023, or Monday, 2/27/2023 with Dr. Li Wolf. Follow up with Orville Santacruz MD in 2 month(s)  Specialty: GASTROENTEROLOGY  For biliary stent removal.  [facility] [place] [address] [address]  [phone]     The patient would prefer morning appointments for both providers. Thank you.

## 2023-02-20 NOTE — TELEPHONE ENCOUNTER
Spoke to the pt today for TCM. The patient was recently hospitalized for cholelithiasis/choledocholithiasis/chronic cholecystitis. The pt does not have a HFU appt scheduled at this time. [facility] attempted to schedule, however there are no available appointments within the TCM time frame. A TCM/HFU appt is recommended by 3/3/2023 as the pt is a moderate risk for readmission. Please advise. BOOK BY DATE (last date for TCM): 3/3/2023    The patient prefers morning appointments. Please f/u with the pt and assist with scheduling a TCM/HFU appt. Thank you!

## 2023-02-20 NOTE — TELEPHONE ENCOUNTER
You should finish the course of antibiotics you have at home for treatment of H. Pylori. I'm glad you're out of the hospital and I'm hoping your pain goes away soon. Please let me know if there is anything else I can help with during your recover phase. We will check for H. Pylori 4 to 6 weeks after you finish the treatment.

## 2023-02-20 NOTE — TELEPHONE ENCOUNTER
From: Trell Walls  To: Arielle Chan MD  Sent: 2/17/2023 6:28 PM CST  Subject: Continue medication     Hello,    I'm finally discharged today. I still have some pancreatitis pain but gallstones and gallbladder are out. My question for you is should I continue my antibiotics for H pylori? They gave me zosyn the entire stay. Thank you.

## 2023-02-21 ENCOUNTER — TELEPHONE (OUTPATIENT)
Facility: CLINIC | Age: 33
End: 2023-02-21

## 2023-02-21 ENCOUNTER — PATIENT OUTREACH (OUTPATIENT)
Dept: CASE MANAGEMENT | Age: 33
End: 2023-02-21

## 2023-02-21 NOTE — PROGRESS NOTES
1st attempt hfu apt request  PCP -decline -doesn't need  GEN SURG -existing apt (2/27)    [facility]  Gastroenterology  [address] [place] [id number] [phone]  Follow up in 2 months for biliary stent removal  Office to call -availability    No answer, LVM advising Dr. Anny Wright office will call pt to schedule in the next few days  Closing encounter

## 2023-02-21 NOTE — TELEPHONE ENCOUNTER
Dr. Nawaf Cedeno,    Per procedure notes patient to follow up in 2 months for biliary stent removal. Do you need to see this patient in office?

## 2023-02-21 NOTE — TELEPHONE ENCOUNTER
[facility] central scheduling calling to schedule a 2 month post op with Dr Kesha Madera. Attempted to reach clinical staff, no answer.   Appointment scheduled for June

## 2023-02-21 NOTE — TELEPHONE ENCOUNTER
She doesn't need an office visit with me.  I sent instructions for scheduling procedure in another TE    Thanks    Deja Nunez MD  [address] - Gastroenterology  2/21/2023  2:52 PM

## 2023-02-24 NOTE — TELEPHONE ENCOUNTER
Diagnosis is encounter for biliary stent removal Z46.89    Thanks    Consuelo Wolf MD  [address] - Gastroenterology  2/24/2023  4:55 PM

## 2023-02-24 NOTE — TELEPHONE ENCOUNTER
Spoke to patient and she agreed to [employer] instructions    Scheduled for: ERCP 106923 (biliary stent removal)  Provider Name: Dr Nidhi Brower   Date: Mon 5/01/2023   Location: [facility]  Sedation: general   Time: 8:30 am (60 minute procedure)  Prep: Nothing after midnight the day before procedure and NPO 3 hrs prior procedure  Meds/Allergies Reconciled?: NKDA  Diagnosis with codes: billirary stent removal Z46.89  Was patient informed to call insurance with codes (Y/N): Yes    Referral sent?:  Yes  [address] [place] [address] or [facility] notified?: I sent an electronic request to Endo Scheduling and received a confirmation today. Medication Orders: hold phentermine and vyvanse 1 week prior   Pt is aware to NOT take iron pills, herbal meds and diet supplements for 7 days before exam. Also to NOT take any form of alcohol, recreational drugs and any forms of ED meds 24 hours before exam.      Misc Orders: Patient was informed that they will need a COVID 19 test prior to their procedure. Patient verbally understood & will await a phone call from [facility] to schedule. Further instructions given by staff:  Instructions given over the phone and pt verbalized understanding     [employer] instructions sent

## 2023-02-27 ENCOUNTER — LAB ENCOUNTER (OUTPATIENT)
Dept: LAB | Facility: HOSPITAL | Age: 33
End: 2023-02-27
Attending: SURGERY
Payer: COMMERCIAL

## 2023-02-27 ENCOUNTER — OFFICE VISIT (OUTPATIENT)
Dept: SURGERY | Facility: CLINIC | Age: 33
End: 2023-02-27

## 2023-02-27 VITALS — WEIGHT: 184 LBS | BODY MASS INDEX: 34 KG/M2

## 2023-02-27 DIAGNOSIS — K85.10 ACUTE BILIARY PANCREATITIS WITHOUT INFECTION OR NECROSIS: ICD-10-CM

## 2023-02-27 DIAGNOSIS — K81.1 CHRONIC CHOLECYSTITIS: Primary | ICD-10-CM

## 2023-02-27 DIAGNOSIS — K80.50 CHOLEDOCHOLITHIASIS: ICD-10-CM

## 2023-02-27 DIAGNOSIS — K81.1 CHRONIC CHOLECYSTITIS: ICD-10-CM

## 2023-02-27 LAB
ALBUMIN SERPL-MCNC: 3.7 G/DL (ref 3.4–5)
ALBUMIN/GLOB SERPL: 0.9 {RATIO} (ref 1–2)
ALP LIVER SERPL-CCNC: 85 U/L
ALT SERPL-CCNC: 37 U/L
ANION GAP SERPL CALC-SCNC: 5 MMOL/L (ref 0–18)
AST SERPL-CCNC: 15 U/L (ref 15–37)
BASOPHILS # BLD AUTO: 0.07 X10(3) UL (ref 0–0.2)
BASOPHILS NFR BLD AUTO: 0.9 %
BILIRUB SERPL-MCNC: 0.3 MG/DL (ref 0.1–2)
BUN BLD-MCNC: 16 MG/DL (ref 7–18)
BUN/CREAT SERPL: 20.3 (ref 10–20)
CALCIUM BLD-MCNC: 9.2 MG/DL (ref 8.5–10.1)
CHLORIDE SERPL-SCNC: 108 MMOL/L (ref 98–112)
CO2 SERPL-SCNC: 25 MMOL/L (ref 21–32)
CREAT BLD-MCNC: 0.79 MG/DL
DEPRECATED RDW RBC AUTO: 39.8 FL (ref 35.1–46.3)
EOSINOPHIL # BLD AUTO: 0.52 X10(3) UL (ref 0–0.7)
EOSINOPHIL NFR BLD AUTO: 6.6 %
ERYTHROCYTE [DISTWIDTH] IN BLOOD BY AUTOMATED COUNT: 12.5 % (ref 11–15)
FASTING STATUS PATIENT QL REPORTED: NO
GFR SERPLBLD BASED ON 1.73 SQ M-ARVRAT: 101 ML/MIN/1.73M2 (ref 60–?)
GLOBULIN PLAS-MCNC: 4.1 G/DL (ref 2.8–4.4)
GLUCOSE BLD-MCNC: 96 MG/DL (ref 70–99)
HCT VFR BLD AUTO: 39.3 %
HGB BLD-MCNC: 13 G/DL
IMM GRANULOCYTES # BLD AUTO: 0.02 X10(3) UL (ref 0–1)
IMM GRANULOCYTES NFR BLD: 0.3 %
LIPASE SERPL-CCNC: 270 U/L (ref 73–393)
LIPASE SERPL-CCNC: 65 U/L (ref 13–75)
LYMPHOCYTES # BLD AUTO: 2.53 X10(3) UL (ref 1–4)
LYMPHOCYTES NFR BLD AUTO: 32.4 %
MCH RBC QN AUTO: 28.6 PG (ref 26–34)
MCHC RBC AUTO-ENTMCNC: 33.1 G/DL (ref 31–37)
MCV RBC AUTO: 86.6 FL
MONOCYTES # BLD AUTO: 0.59 X10(3) UL (ref 0.1–1)
MONOCYTES NFR BLD AUTO: 7.5 %
NEUTROPHILS # BLD AUTO: 4.09 X10 (3) UL (ref 1.5–7.7)
NEUTROPHILS # BLD AUTO: 4.09 X10(3) UL (ref 1.5–7.7)
NEUTROPHILS NFR BLD AUTO: 52.3 %
OSMOLALITY SERPL CALC.SUM OF ELEC: 287 MOSM/KG (ref 275–295)
PLATELET # BLD AUTO: 346 10(3)UL (ref 150–450)
POTASSIUM SERPL-SCNC: 4.1 MMOL/L (ref 3.5–5.1)
PROT SERPL-MCNC: 7.8 G/DL (ref 6.4–8.2)
RBC # BLD AUTO: 4.54 X10(6)UL
SODIUM SERPL-SCNC: 138 MMOL/L (ref 136–145)
WBC # BLD AUTO: 7.8 X10(3) UL (ref 4–11)

## 2023-02-27 PROCEDURE — 83690 ASSAY OF LIPASE: CPT

## 2023-02-27 PROCEDURE — 85025 COMPLETE CBC W/AUTO DIFF WBC: CPT

## 2023-02-27 PROCEDURE — 80053 COMPREHEN METABOLIC PANEL: CPT

## 2023-02-27 PROCEDURE — 99024 POSTOP FOLLOW-UP VISIT: CPT | Performed by: SURGERY

## 2023-02-27 PROCEDURE — 36415 COLL VENOUS BLD VENIPUNCTURE: CPT

## 2023-02-27 NOTE — PROGRESS NOTES
Postoperative Patient Follow-up      2/27/2023    Ying Hsu 35year old      HPI  Patient presents with:  Post-Op: S/P Laparoscopic Cholecystectomy 2/16/2023. Patient states she has RUQ pain radiating to back, states she has had 4 attacks recently. Was diagnosed with pancreatitis. Denies fevers, bowels are normal and regular, appetite is good, but she has nausea. RUQ pain similar to preop still , mild R periumb incisional pain, nausea and chills, no fever. Exam  Abd soft and nt, nd, incisions clean and dry. Path ok  CMP and LIP and CBC WNL today. Assessment/Plan  Assessment   Chronic cholecystitis  (primary encounter diagnosis)  Choledocholithiasis    Stable s/p robotic lap choley after ERCP/stent for choledocholithiasis. Recent dx H pylori. Limited activity for 3 weeks postop - note for work, labs ok today, if persistent sx for a few more weeks then GI and medical f/u sooner rather than later. GI - H. Pylori and stent management.          Aleyda Benitez MD

## 2023-02-27 NOTE — PROGRESS NOTES
Results reviewed. Tests show no significant abnormalities. Please inform patient. The labs have all returned to normal.  Give the symptoms a couple weeks to resolve on their own. Primary care and/or GI follow up thereafter as needed.

## 2023-03-08 ENCOUNTER — TELEPHONE (OUTPATIENT)
Facility: CLINIC | Age: 33
End: 2023-03-08

## 2023-03-08 DIAGNOSIS — R63.2 BINGE EATING: ICD-10-CM

## 2023-03-08 NOTE — TELEPHONE ENCOUNTER
Patient returned call - states she received call regarding US of gallbladder, but had gall bladder removed at [facility] 2/17/2023). For additional questions please call. Thank you.

## 2023-03-08 NOTE — TELEPHONE ENCOUNTER
1st reminder letter sent out via mail and [employer] for the following:   Chato Doshi (ZKF=[id number]) (Order #[id number]) on 1/18/23

## 2023-03-11 ENCOUNTER — APPOINTMENT (OUTPATIENT)
Dept: CT IMAGING | Facility: HOSPITAL | Age: 33
End: 2023-03-11
Attending: STUDENT IN AN ORGANIZED HEALTH CARE EDUCATION/TRAINING PROGRAM
Payer: COMMERCIAL

## 2023-03-11 ENCOUNTER — HOSPITAL ENCOUNTER (EMERGENCY)
Facility: HOSPITAL | Age: 33
Discharge: HOME | End: 2023-03-11
Attending: STUDENT IN AN ORGANIZED HEALTH CARE EDUCATION/TRAINING PROGRAM
Payer: COMMERCIAL

## 2023-03-11 ENCOUNTER — PATIENT MESSAGE (OUTPATIENT)
Facility: CLINIC | Age: 33
End: 2023-03-11

## 2023-03-11 ENCOUNTER — TELEPHONE (OUTPATIENT)
Facility: CLINIC | Age: 33
End: 2023-03-11

## 2023-03-11 VITALS
WEIGHT: 183 LBS | DIASTOLIC BLOOD PRESSURE: 69 MMHG | HEART RATE: 71 BPM | TEMPERATURE: 98 F | SYSTOLIC BLOOD PRESSURE: 103 MMHG | BODY MASS INDEX: 33 KG/M2 | RESPIRATION RATE: 20 BRPM | OXYGEN SATURATION: 97 %

## 2023-03-11 DIAGNOSIS — R10.11 ABDOMINAL PAIN, RIGHT UPPER QUADRANT: Primary | ICD-10-CM

## 2023-03-11 LAB
ALBUMIN SERPL-MCNC: 3.8 G/DL (ref 3.4–5)
ALBUMIN/GLOB SERPL: 1 {RATIO} (ref 1–2)
ALP LIVER SERPL-CCNC: 72 U/L
ALT SERPL-CCNC: 23 U/L
ANION GAP SERPL CALC-SCNC: 3 MMOL/L (ref 0–18)
AST SERPL-CCNC: 13 U/L (ref 15–37)
B-HCG UR QL: NEGATIVE
BASOPHILS # BLD AUTO: 0.03 X10(3) UL (ref 0–0.2)
BASOPHILS NFR BLD AUTO: 0.4 %
BILIRUB SERPL-MCNC: 0.5 MG/DL (ref 0.1–2)
BILIRUB UR QL: NEGATIVE
BUN BLD-MCNC: 19 MG/DL (ref 7–18)
BUN/CREAT SERPL: 25 (ref 10–20)
CALCIUM BLD-MCNC: 8.9 MG/DL (ref 8.5–10.1)
CHLORIDE SERPL-SCNC: 111 MMOL/L (ref 98–112)
CO2 SERPL-SCNC: 27 MMOL/L (ref 21–32)
COLOR UR: YELLOW
CREAT BLD-MCNC: 0.76 MG/DL
DEPRECATED RDW RBC AUTO: 40.1 FL (ref 35.1–46.3)
EOSINOPHIL # BLD AUTO: 0.28 X10(3) UL (ref 0–0.7)
EOSINOPHIL NFR BLD AUTO: 4.2 %
ERYTHROCYTE [DISTWIDTH] IN BLOOD BY AUTOMATED COUNT: 12.8 % (ref 11–15)
GFR SERPLBLD BASED ON 1.73 SQ M-ARVRAT: 106 ML/MIN/1.73M2 (ref 60–?)
GLOBULIN PLAS-MCNC: 4 G/DL (ref 2.8–4.4)
GLUCOSE BLD-MCNC: 86 MG/DL (ref 70–99)
GLUCOSE UR-MCNC: NEGATIVE MG/DL
HCT VFR BLD AUTO: 38.8 %
HGB BLD-MCNC: 13 G/DL
IMM GRANULOCYTES # BLD AUTO: 0.02 X10(3) UL (ref 0–1)
IMM GRANULOCYTES NFR BLD: 0.3 %
KETONES UR-MCNC: NEGATIVE MG/DL
LEUKOCYTE ESTERASE UR QL STRIP.AUTO: NEGATIVE
LIPASE SERPL-CCNC: 129 U/L (ref 73–393)
LIPASE SERPL-CCNC: 31 U/L (ref 13–75)
LYMPHOCYTES # BLD AUTO: 2.17 X10(3) UL (ref 1–4)
LYMPHOCYTES NFR BLD AUTO: 32.4 %
MCH RBC QN AUTO: 28.9 PG (ref 26–34)
MCHC RBC AUTO-ENTMCNC: 33.5 G/DL (ref 31–37)
MCV RBC AUTO: 86.2 FL
MONOCYTES # BLD AUTO: 0.43 X10(3) UL (ref 0.1–1)
MONOCYTES NFR BLD AUTO: 6.4 %
NEUTROPHILS # BLD AUTO: 3.76 X10 (3) UL (ref 1.5–7.7)
NEUTROPHILS # BLD AUTO: 3.76 X10(3) UL (ref 1.5–7.7)
NEUTROPHILS NFR BLD AUTO: 56.3 %
NITRITE UR QL STRIP.AUTO: NEGATIVE
OSMOLALITY SERPL CALC.SUM OF ELEC: 294 MOSM/KG (ref 275–295)
PH UR: 6 [PH] (ref 5–8)
PLATELET # BLD AUTO: 304 10(3)UL (ref 150–450)
POTASSIUM SERPL-SCNC: 3.5 MMOL/L (ref 3.5–5.1)
PROT SERPL-MCNC: 7.8 G/DL (ref 6.4–8.2)
PROT UR-MCNC: NEGATIVE MG/DL
RBC # BLD AUTO: 4.5 X10(6)UL
SODIUM SERPL-SCNC: 141 MMOL/L (ref 136–145)
SP GR UR STRIP: >1.03 (ref 1–1.03)
UROBILINOGEN UR STRIP-ACNC: <2
VIT C UR-MCNC: NEGATIVE MG/DL
WBC # BLD AUTO: 6.7 X10(3) UL (ref 4–11)

## 2023-03-11 PROCEDURE — 96375 TX/PRO/DX INJ NEW DRUG ADDON: CPT

## 2023-03-11 PROCEDURE — 83690 ASSAY OF LIPASE: CPT | Performed by: STUDENT IN AN ORGANIZED HEALTH CARE EDUCATION/TRAINING PROGRAM

## 2023-03-11 PROCEDURE — 99284 EMERGENCY DEPT VISIT MOD MDM: CPT

## 2023-03-11 PROCEDURE — 81025 URINE PREGNANCY TEST: CPT

## 2023-03-11 PROCEDURE — 81001 URINALYSIS AUTO W/SCOPE: CPT | Performed by: STUDENT IN AN ORGANIZED HEALTH CARE EDUCATION/TRAINING PROGRAM

## 2023-03-11 PROCEDURE — 74177 CT ABD & PELVIS W/CONTRAST: CPT | Performed by: STUDENT IN AN ORGANIZED HEALTH CARE EDUCATION/TRAINING PROGRAM

## 2023-03-11 PROCEDURE — 96374 THER/PROPH/DIAG INJ IV PUSH: CPT

## 2023-03-11 PROCEDURE — 85025 COMPLETE CBC W/AUTO DIFF WBC: CPT | Performed by: STUDENT IN AN ORGANIZED HEALTH CARE EDUCATION/TRAINING PROGRAM

## 2023-03-11 PROCEDURE — 99285 EMERGENCY DEPT VISIT HI MDM: CPT

## 2023-03-11 PROCEDURE — 80053 COMPREHEN METABOLIC PANEL: CPT | Performed by: STUDENT IN AN ORGANIZED HEALTH CARE EDUCATION/TRAINING PROGRAM

## 2023-03-11 RX ORDER — MORPHINE SULFATE 4 MG/ML
4 INJECTION, SOLUTION INTRAMUSCULAR; INTRAVENOUS ONCE
Status: COMPLETED | OUTPATIENT
Start: 2023-03-11 | End: 2023-03-11

## 2023-03-11 RX ORDER — ONDANSETRON 2 MG/ML
4 INJECTION INTRAMUSCULAR; INTRAVENOUS ONCE
Status: COMPLETED | OUTPATIENT
Start: 2023-03-11 | End: 2023-03-11

## 2023-03-11 RX ORDER — FAMOTIDINE 20 MG/1
20 TABLET, FILM COATED ORAL 2 TIMES DAILY
Qty: 28 TABLET | Refills: 0 | Status: SHIPPED | OUTPATIENT
Start: 2023-03-11 | End: 2023-03-25

## 2023-03-11 RX ORDER — DICYCLOMINE HCL 20 MG
20 TABLET ORAL 4 TIMES DAILY PRN
Qty: 30 TABLET | Refills: 0 | Status: SHIPPED | OUTPATIENT
Start: 2023-03-11 | End: 2023-04-10

## 2023-03-11 RX ORDER — KETOROLAC TROMETHAMINE 15 MG/ML
15 INJECTION, SOLUTION INTRAMUSCULAR; INTRAVENOUS ONCE
Status: COMPLETED | OUTPATIENT
Start: 2023-03-11 | End: 2023-03-11

## 2023-03-11 NOTE — TELEPHONE ENCOUNTER
Svetlana Howe called the answering service this morning complaining of recurrent right upper quadrant pain that feels like her prior biliary colic related to her significant gallstone. She presented to the hospital recently with gallstone pancreatitis underwent an ERCP with sphincterotomy and stent placement followed by a laparoscopic cholecystectomy. She reports intermittent nausea, vomiting and significant right upper quadrant pain that radiates to the shoulder blade that is been going on for the last 1 to 2 weeks. Given recent manipulation of the right upper quadrant with both an ERCP and laparoscopic cholecystectomy is certainly possible she could have a retained stone versus stent occlusion versus stent migration and given this concern it is best she get imaging. I discussed that she go to immediate care for labs and a CT of the abdomen pelvis with IV contrast or come to the ER for the same. She elected to go to immediate care with hopes of getting cross-sectional imaging and labs. Should her liver enzymes mainly bilirubin, alk phos be elevated and/or imaging show hepatobiliary pathology she will need to come to the hospital for further evaluation. She expressed understanding will go to immediate care right now.     Carter Askew MD

## 2023-03-11 NOTE — ED INITIAL ASSESSMENT (HPI)
Aox4. Complaints of generalized abd pain for ~1week. Cholecystectomy 2-17-23; -fevers, chills. Told to come to ED by GI. CHRIS

## 2023-03-12 NOTE — TELEPHONE ENCOUNTER
From: Adelso Delarosa  To: Charlies Brittle, MD  Sent: 3/11/2023 7:28 AM CST  Subject: Severe Gallbladder attack     Hi    Unfortunately I began to have a very severe gallbladder attack yesterday at work and it has not gone away, it goes away after a few minutes but it comes back. My pain is causing me nausea and now even when I move the pain to that right upper quadrant is so bad, the worse is in the mid right back between shoulder blade and scapula. Please tell me how I should proceed. I did take 1000mg of Tylenol. I'm very scared. I don't have my gallbladder anymore and I do still have that stent in place. What is happening to me?

## 2023-03-13 ENCOUNTER — TELEPHONE (OUTPATIENT)
Facility: CLINIC | Age: 33
End: 2023-03-13

## 2023-03-13 ENCOUNTER — PATIENT MESSAGE (OUTPATIENT)
Facility: CLINIC | Age: 33
End: 2023-03-13

## 2023-03-13 NOTE — TELEPHONE ENCOUNTER
Attempted to call the patient to follow-up and see how she is doing. She did not answer the phone. I did leave a message.

## 2023-03-15 NOTE — TELEPHONE ENCOUNTER
From: Christine Rich  Sent: 3/13/2023 9:10 PM CDT  To: [facility] Clinical Staff  Subject: How are you feeling    Hi,    I'm feeling zero pain now. :-) just soreness when taking a deep breath to [facility]. I still had very bad pain 1hr after discharge Saturday, and on and off all night like attacks, I promise you it was bad. I ate normal but bland food, took 1000mg Tylenol with no relief, I did not start the Bentyl, :-(  Pain was gone on Sunday morning. On Saturday Pain concentrated in right upper quadrant/right flank and Sunday afternoon after discharge,it was localized in epigastric region to the left. I know, it's bizarre. I finished H-pylo tx March 3rd. And yes, I will be available noon Tomorrow. Thank you.

## 2023-04-03 ENCOUNTER — TELEPHONE (OUTPATIENT)
Facility: CLINIC | Age: 33
End: 2023-04-03

## 2023-04-14 DIAGNOSIS — R63.2 BINGE EATING: Primary | ICD-10-CM

## 2023-04-25 ENCOUNTER — TELEPHONE (OUTPATIENT)
Facility: CLINIC | Age: 33
End: 2023-04-25

## 2023-04-25 DIAGNOSIS — Z86.19 HISTORY OF HELICOBACTER PYLORI INFECTION: Primary | ICD-10-CM

## 2023-04-26 NOTE — TELEPHONE ENCOUNTER
I spoke to the pt    She does take omeprazole on an as needed basis    She will completely stop the omeprazole for 2 weeks and then go to the lab for the breath test    She is aware that taking omeprazole can cause false negative results if taken while testing for h pylori    She will refrain for food & drink one hour before taking the breath test

## 2023-04-26 NOTE — TELEPHONE ENCOUNTER
We should ensure Ronald Collier is off Omeprazole. If she is, can get breath test drawn now. Thank you!

## 2023-04-26 NOTE — TELEPHONE ENCOUNTER
Dr Gaby Uribe,    Pt was treated with Amoxicillin and Clarithromycin for h pylori 02/21/2023    Please order f/u h pylori breath test

## 2023-05-01 ENCOUNTER — ANESTHESIA EVENT (OUTPATIENT)
Dept: ENDOSCOPY | Facility: HOSPITAL | Age: 33
End: 2023-05-01
Payer: COMMERCIAL

## 2023-05-01 ENCOUNTER — ANESTHESIA (OUTPATIENT)
Dept: ENDOSCOPY | Facility: HOSPITAL | Age: 33
End: 2023-05-01
Payer: COMMERCIAL

## 2023-05-01 ENCOUNTER — HOSPITAL ENCOUNTER (OUTPATIENT)
Facility: HOSPITAL | Age: 33
Setting detail: HOSPITAL OUTPATIENT SURGERY
Discharge: HOME | End: 2023-05-01
Attending: INTERNAL MEDICINE | Admitting: INTERNAL MEDICINE
Payer: COMMERCIAL

## 2023-05-01 ENCOUNTER — APPOINTMENT (OUTPATIENT)
Dept: GENERAL RADIOLOGY | Facility: HOSPITAL | Age: 33
End: 2023-05-01
Attending: INTERNAL MEDICINE
Payer: COMMERCIAL

## 2023-05-01 VITALS
SYSTOLIC BLOOD PRESSURE: 118 MMHG | HEIGHT: 63 IN | DIASTOLIC BLOOD PRESSURE: 67 MMHG | RESPIRATION RATE: 15 BRPM | TEMPERATURE: 97 F | BODY MASS INDEX: 31.01 KG/M2 | WEIGHT: 175 LBS | OXYGEN SATURATION: 100 % | HEART RATE: 74 BPM

## 2023-05-01 LAB — B-HCG UR QL: NEGATIVE

## 2023-05-01 PROCEDURE — 0FPB8DZ REMOVAL OF INTRALUMINAL DEVICE FROM HEPATOBILIARY DUCT, VIA NATURAL OR ARTIFICIAL OPENING ENDOSCOPIC: ICD-10-PCS | Performed by: INTERNAL MEDICINE

## 2023-05-01 PROCEDURE — 43275 ERCP REMOVE FORGN BODY DUCT: CPT | Performed by: INTERNAL MEDICINE

## 2023-05-01 PROCEDURE — 74328 X-RAY BILE DUCT ENDOSCOPY: CPT | Performed by: INTERNAL MEDICINE

## 2023-05-01 RX ORDER — MIDAZOLAM HYDROCHLORIDE 1 MG/ML
INJECTION INTRAMUSCULAR; INTRAVENOUS AS NEEDED
Status: DISCONTINUED | OUTPATIENT
Start: 2023-05-01 | End: 2023-05-01 | Stop reason: SURG

## 2023-05-01 RX ORDER — ONDANSETRON 2 MG/ML
INJECTION INTRAMUSCULAR; INTRAVENOUS AS NEEDED
Status: DISCONTINUED | OUTPATIENT
Start: 2023-05-01 | End: 2023-05-01 | Stop reason: SURG

## 2023-05-01 RX ORDER — GLYCOPYRROLATE 0.2 MG/ML
INJECTION, SOLUTION INTRAMUSCULAR; INTRAVENOUS AS NEEDED
Status: DISCONTINUED | OUTPATIENT
Start: 2023-05-01 | End: 2023-05-01 | Stop reason: SURG

## 2023-05-01 RX ORDER — NALOXONE HYDROCHLORIDE 0.4 MG/ML
80 INJECTION, SOLUTION INTRAMUSCULAR; INTRAVENOUS; SUBCUTANEOUS AS NEEDED
OUTPATIENT
Start: 2023-05-01 | End: 2023-05-01

## 2023-05-01 RX ORDER — LIDOCAINE HYDROCHLORIDE 10 MG/ML
INJECTION, SOLUTION EPIDURAL; INFILTRATION; INTRACAUDAL; PERINEURAL AS NEEDED
Status: DISCONTINUED | OUTPATIENT
Start: 2023-05-01 | End: 2023-05-01 | Stop reason: SURG

## 2023-05-01 RX ORDER — SODIUM CHLORIDE, SODIUM LACTATE, POTASSIUM CHLORIDE, CALCIUM CHLORIDE 600; 310; 30; 20 MG/100ML; MG/100ML; MG/100ML; MG/100ML
INJECTION, SOLUTION INTRAVENOUS CONTINUOUS
Status: DISCONTINUED | OUTPATIENT
Start: 2023-05-01 | End: 2023-05-01

## 2023-05-01 RX ORDER — DEXAMETHASONE SODIUM PHOSPHATE 4 MG/ML
VIAL (ML) INJECTION AS NEEDED
Status: DISCONTINUED | OUTPATIENT
Start: 2023-05-01 | End: 2023-05-01 | Stop reason: SURG

## 2023-05-01 RX ORDER — SODIUM CHLORIDE, SODIUM LACTATE, POTASSIUM CHLORIDE, CALCIUM CHLORIDE 600; 310; 30; 20 MG/100ML; MG/100ML; MG/100ML; MG/100ML
INJECTION, SOLUTION INTRAVENOUS CONTINUOUS
OUTPATIENT
Start: 2023-05-01

## 2023-05-01 RX ADMIN — MIDAZOLAM HYDROCHLORIDE 2 MG: 1 INJECTION INTRAMUSCULAR; INTRAVENOUS at 08:32:00

## 2023-05-01 RX ADMIN — GLYCOPYRROLATE 0.2 MG: 0.2 INJECTION, SOLUTION INTRAMUSCULAR; INTRAVENOUS at 08:41:00

## 2023-05-01 RX ADMIN — LIDOCAINE HYDROCHLORIDE 50 MG: 10 INJECTION, SOLUTION EPIDURAL; INFILTRATION; INTRACAUDAL; PERINEURAL at 08:34:00

## 2023-05-01 RX ADMIN — ONDANSETRON 4 MG: 2 INJECTION INTRAMUSCULAR; INTRAVENOUS at 08:43:00

## 2023-05-01 RX ADMIN — DEXAMETHASONE SODIUM PHOSPHATE 4 MG: 4 MG/ML VIAL (ML) INJECTION at 08:43:00

## 2023-05-01 RX ADMIN — SODIUM CHLORIDE, SODIUM LACTATE, POTASSIUM CHLORIDE, CALCIUM CHLORIDE: 600; 310; 30; 20 INJECTION, SOLUTION INTRAVENOUS at 08:31:00

## 2023-05-01 RX ADMIN — SODIUM CHLORIDE, SODIUM LACTATE, POTASSIUM CHLORIDE, CALCIUM CHLORIDE: 600; 310; 30; 20 INJECTION, SOLUTION INTRAVENOUS at 09:13:00

## 2023-05-01 NOTE — ANESTHESIA PROCEDURE NOTES
Airway  Date/Time: 5/1/2023 8:37 AM  Urgency: elective    Airway not difficult    General Information and Staff    Patient location during procedure: endo  Resident/CRNA: Leo Huntley CRNA  Performed: CRNA   Performed by: Leo Huntley CRNA  Authorized by:  Leo Huntley CRNA      Indications and Patient Condition  Indications for airway management: airway protection and anesthesia  Spontaneous ventilation: present  Sedation level: deep  Preoxygenated: yes  Patient position: sniffing  Mask difficulty assessment: 1 - vent by mask    Final Airway Details  Final airway type: endotracheal airway      Successful airway: ETT  Cuffed: yes   Successful intubation technique: direct laryngoscopy  Facilitating devices/methods: intubating stylet  Endotracheal tube insertion site: oral  Blade: Dell  Blade size: #4  ETT size (mm): 7.0    Cormack-Lehane Classification: grade I - full view of glottis  Placement verified by: chest auscultation and capnometry   Measured from: lips  ETT to lips (cm): 22  Number of attempts at approach: 1

## 2023-06-05 RX ORDER — PHENTERMINE HYDROCHLORIDE 30 MG/1
CAPSULE ORAL
Qty: 30 CAPSULE | Refills: 2 | Status: SHIPPED | OUTPATIENT
Start: 2023-06-05

## 2023-06-15 DIAGNOSIS — R63.2 BINGE EATING: Primary | ICD-10-CM

## 2023-06-20 ENCOUNTER — OFFICE VISIT (OUTPATIENT)
Dept: OBGYN CLINIC | Facility: CLINIC | Age: 33
End: 2023-06-20

## 2023-06-20 VITALS — SYSTOLIC BLOOD PRESSURE: 116 MMHG | DIASTOLIC BLOOD PRESSURE: 69 MMHG | WEIGHT: 181 LBS | BODY MASS INDEX: 32 KG/M2

## 2023-06-20 DIAGNOSIS — N92.6 PROLONGED PERIODS: ICD-10-CM

## 2023-06-20 DIAGNOSIS — N94.10 DYSPAREUNIA IN FEMALE: ICD-10-CM

## 2023-06-20 DIAGNOSIS — Z01.419 WOMEN'S ANNUAL ROUTINE GYNECOLOGICAL EXAMINATION: Primary | ICD-10-CM

## 2023-06-20 PROCEDURE — 99395 PREV VISIT EST AGE 18-39: CPT | Performed by: OBSTETRICS & GYNECOLOGY

## 2023-06-20 PROCEDURE — 3078F DIAST BP <80 MM HG: CPT | Performed by: OBSTETRICS & GYNECOLOGY

## 2023-06-20 PROCEDURE — 3074F SYST BP LT 130 MM HG: CPT | Performed by: OBSTETRICS & GYNECOLOGY

## 2023-08-31 ENCOUNTER — OFFICE VISIT (OUTPATIENT)
Dept: SURGERY | Facility: CLINIC | Age: 33
End: 2023-08-31
Payer: COMMERCIAL

## 2023-08-31 VITALS
OXYGEN SATURATION: 97 % | SYSTOLIC BLOOD PRESSURE: 120 MMHG | BODY MASS INDEX: 33.55 KG/M2 | WEIGHT: 180 LBS | HEIGHT: 61.4 IN | DIASTOLIC BLOOD PRESSURE: 66 MMHG | HEART RATE: 86 BPM

## 2023-08-31 DIAGNOSIS — Z51.81 ENCOUNTER FOR THERAPEUTIC DRUG MONITORING: ICD-10-CM

## 2023-08-31 DIAGNOSIS — R63.2 BINGE EATING: Primary | ICD-10-CM

## 2023-08-31 DIAGNOSIS — F43.9 STRESS: ICD-10-CM

## 2023-08-31 DIAGNOSIS — E66.9 OBESITY (BMI 30-39.9): ICD-10-CM

## 2023-08-31 PROCEDURE — 3074F SYST BP LT 130 MM HG: CPT | Performed by: INTERNAL MEDICINE

## 2023-08-31 PROCEDURE — 3078F DIAST BP <80 MM HG: CPT | Performed by: INTERNAL MEDICINE

## 2023-08-31 PROCEDURE — 3008F BODY MASS INDEX DOCD: CPT | Performed by: INTERNAL MEDICINE

## 2023-08-31 PROCEDURE — 99215 OFFICE O/P EST HI 40 MIN: CPT | Performed by: INTERNAL MEDICINE

## 2023-08-31 RX ORDER — PHENTERMINE HYDROCHLORIDE 8 MG/1
1 TABLET ORAL DAILY
Qty: 30 TABLET | Refills: 2 | Status: SHIPPED | OUTPATIENT
Start: 2023-08-31 | End: 2023-09-30

## 2023-09-11 DIAGNOSIS — R63.2 BINGE EATING: ICD-10-CM

## 2023-09-14 RX ORDER — PHENTERMINE HYDROCHLORIDE 30 MG/1
30 CAPSULE ORAL EVERY MORNING
Qty: 30 CAPSULE | Refills: 0 | OUTPATIENT
Start: 2023-09-14

## 2023-09-19 ENCOUNTER — OFFICE VISIT (OUTPATIENT)
Dept: PODIATRY CLINIC | Facility: CLINIC | Age: 33
End: 2023-09-19

## 2023-09-19 DIAGNOSIS — B35.1 ONYCHOMYCOSIS: Primary | ICD-10-CM

## 2023-09-19 PROCEDURE — 99213 OFFICE O/P EST LOW 20 MIN: CPT | Performed by: STUDENT IN AN ORGANIZED HEALTH CARE EDUCATION/TRAINING PROGRAM

## 2023-09-19 NOTE — PROGRESS NOTES
[address] [facility]ry  Progress Note      Karyn Corcoran is a 35year old female. Patient presents with:  Fungus Nails: R foot- Pt here due pain in her 4th and 5th toe. Pt states she had it for about 2 years. Pt rates pain a 8/10. Pain is worse when she wears certain shoes. HPI:     Patient is a pleasant 27-year-old female presents to clinic today for evaluation of right fourth and fifth digit nail fungus. Patient admits to pain with ambulation and pressure due to the thickness of the toenails. Patient was not able to get on oral treatment in the past due to high hepatic enzymes. Patient would like to discuss nail treatment again. Allergies: Patient has no known allergies. Current Outpatient Medications   Medication Sig Dispense Refill    lisdexamfetamine (VYVANSE) 50 MG Oral Cap Take 1 capsule (50 mg total) by mouth daily. 30 capsule 0    sertraline 50 MG Oral Tab Take 1 tablet (50 mg total) by mouth every evening. Take half tablet for the first week 90 tablet 1    Phentermine HCl (LOMAIRA) 8 MG Oral Tab Take 1 tablet by mouth daily for 30 doses. 30 tablet 2    COLLAGEN-VITAMIN C OR Take by mouth. Multiple Vitamins-Minerals (BIOTIN PLUS/CALCIUM/VIT D3) Oral Tab Take by mouth. Omeprazole 40 MG Oral Capsule Delayed Release Take 1 capsule (40 mg total) by mouth daily. Take 1 capsule by mouth daily before breakfast. 30 capsule 11      Past Medical History:   Diagnosis Date    COVID-19     History of cholecystectomy 2023    Obesity (BMI 30-39. 9)       Past Surgical History:   Procedure Laterality Date                CHOLECYSTECTOMY  2023    ERCP,INSERT STENT,BILIARY/PANC  02/15/2023    LAPAROSCOPIC CHOLECYSTECTOMY  2023    LEEP        Family History   Problem Relation Age of Onset    Lipids Father     Diabetes Mother     Diabetes Maternal Grandmother     Hypertension Maternal Grandmother     Stroke Maternal Grandmother     Other (Other) Maternal Grandmother         kidney disease    Diabetes Other     Breast Cancer Neg     Ovarian Cancer Neg     Colon Cancer Neg     Uterine Cancer Neg       Social History    Socioeconomic History      Marital status:       Number of children: 1    Tobacco Use      Smoking status: Never      Smokeless tobacco: Never    Vaping Use      Vaping Use: Never used    Substance and Sexual Activity      Alcohol use: No      Drug use: Never          REVIEW OF SYSTEMS:     Denies nause, fever, chills  No calf pain  Denies chest pain or SOB      EXAM:   LMP 05/25/2023 (Exact Date)   GENERAL: well developed, well nourished, in no apparent distress  EXTREMITIES:   1. Integument: Normal skin temperature and turgor. Right fourth and fifth digit toenails are thickened and dystrophic with pain on palpation. 2. Vascular: Dorsalis pedis two out of four bilateral and posterior tibial pulses two out of   four bilateral, capillary refill normal.   3. Musculoskeletal: All muscle groups are graded 5 out of 5 in the foot and ankle. 4. Neurological: Normal sharp dull sensation; reflexes normal.             ASSESSMENT AND PLAN:   Diagnoses and all orders for this visit:    Onychomycosis        Plan:       Reviewed treatment options for nail dystrophy / onychomycosis including:   No treatment and monitoring, topical meds, oral meds, nail removal and laser treatment. Advantages and disadvantages of each reviewed. Using oral agents would require regular   blood test monitoring due to risk of hepatotoxicity and other adverse reactions including drug interactions. Topical meds are much safer yet carry very low efficacy. Pt has opted for nail removal of the right fourth and fifth digit toenails. Advised patient to schedule a 30-minute procedure to have both right fourth and fifth digit toenails removed with a chemical matricectomy    The patient indicates understanding of these issues and agrees to the plan.         Mckenna Ibarra Aguilar Calloway

## 2023-10-02 RX ORDER — PHENTERMINE HYDROCHLORIDE 30 MG/1
30 CAPSULE ORAL EVERY MORNING
Qty: 30 CAPSULE | Refills: 0 | Status: SHIPPED | OUTPATIENT
Start: 2023-10-02

## 2023-10-10 DIAGNOSIS — R63.2 BINGE EATING: ICD-10-CM

## 2023-10-11 RX ORDER — LISDEXAMFETAMINE DIMESYLATE CAPSULES 50 MG/1
50 CAPSULE ORAL DAILY
Qty: 30 CAPSULE | Refills: 0 | Status: SHIPPED | OUTPATIENT
Start: 2023-10-11 | End: 2023-11-10

## 2023-10-13 DIAGNOSIS — R63.2 BINGE EATING: ICD-10-CM

## 2023-10-16 DIAGNOSIS — E66.9 OBESITY (BMI 30-39.9): Primary | ICD-10-CM

## 2023-10-16 RX ORDER — LISDEXAMFETAMINE DIMESYLATE CAPSULES 50 MG/1
50 CAPSULE ORAL DAILY
Qty: 30 CAPSULE | Refills: 0 | OUTPATIENT
Start: 2023-10-16 | End: 2023-11-15

## 2023-10-17 ENCOUNTER — TELEPHONE (OUTPATIENT)
Dept: SURGERY | Facility: CLINIC | Age: 33
End: 2023-10-17

## 2023-10-17 DIAGNOSIS — R63.2 BINGE EATING: ICD-10-CM

## 2023-10-17 RX ORDER — LISDEXAMFETAMINE DIMESYLATE CAPSULES 50 MG/1
50 CAPSULE ORAL DAILY
Qty: 30 CAPSULE | Refills: 0 | Status: SHIPPED | OUTPATIENT
Start: 2023-10-17 | End: 2023-11-16

## 2023-10-26 ENCOUNTER — TELEPHONE (OUTPATIENT)
Dept: SURGERY | Facility: CLINIC | Age: 33
End: 2023-10-26

## 2023-10-30 ENCOUNTER — HOSPITAL ENCOUNTER (OUTPATIENT)
Dept: ULTRASOUND IMAGING | Facility: HOSPITAL | Age: 33
Discharge: HOME | End: 2023-10-30
Attending: OBSTETRICS & GYNECOLOGY
Payer: COMMERCIAL

## 2023-10-30 DIAGNOSIS — N92.6 PROLONGED PERIODS: ICD-10-CM

## 2023-10-30 PROCEDURE — 76856 US EXAM PELVIC COMPLETE: CPT | Performed by: OBSTETRICS & GYNECOLOGY

## 2023-10-30 PROCEDURE — 76830 TRANSVAGINAL US NON-OB: CPT | Performed by: OBSTETRICS & GYNECOLOGY

## 2023-11-01 RX ORDER — PHENTERMINE HYDROCHLORIDE 30 MG/1
30 CAPSULE ORAL EVERY MORNING
Qty: 30 CAPSULE | Refills: 0 | OUTPATIENT
Start: 2023-11-01

## 2023-11-08 DIAGNOSIS — R63.2 BINGE EATING: ICD-10-CM

## 2023-11-08 RX ORDER — LISDEXAMFETAMINE DIMESYLATE CAPSULES 50 MG/1
50 CAPSULE ORAL DAILY
Qty: 30 CAPSULE | Refills: 0 | Status: SHIPPED | OUTPATIENT
Start: 2023-11-08 | End: 2023-12-08

## 2023-11-24 DIAGNOSIS — E66.9 OBESITY (BMI 30-39.9): ICD-10-CM

## 2023-11-27 RX ORDER — PHENTERMINE HYDROCHLORIDE 8 MG/1
1 TABLET ORAL DAILY
Qty: 30 TABLET | Refills: 2 | OUTPATIENT
Start: 2023-11-27 | End: 2023-12-27

## 2023-11-27 RX ORDER — PHENTERMINE HYDROCHLORIDE 30 MG/1
30 CAPSULE ORAL EVERY MORNING
Qty: 30 CAPSULE | Refills: 0 | Status: SHIPPED | OUTPATIENT
Start: 2023-11-27

## 2023-11-28 DIAGNOSIS — R63.2 BINGE EATING: ICD-10-CM

## 2023-11-28 RX ORDER — LISDEXAMFETAMINE DIMESYLATE CAPSULES 50 MG/1
50 CAPSULE ORAL DAILY
Qty: 30 CAPSULE | Refills: 0 | Status: SHIPPED | OUTPATIENT
Start: 2023-11-28 | End: 2023-12-28

## 2023-12-01 ENCOUNTER — OFFICE VISIT (OUTPATIENT)
Dept: SURGERY | Facility: CLINIC | Age: 33
End: 2023-12-01
Payer: COMMERCIAL

## 2023-12-01 VITALS
HEIGHT: 61.4 IN | DIASTOLIC BLOOD PRESSURE: 71 MMHG | SYSTOLIC BLOOD PRESSURE: 117 MMHG | HEART RATE: 114 BPM | BODY MASS INDEX: 33.23 KG/M2 | WEIGHT: 178.31 LBS | OXYGEN SATURATION: 97 %

## 2023-12-01 DIAGNOSIS — E66.9 OBESITY (BMI 30-39.9): ICD-10-CM

## 2023-12-01 DIAGNOSIS — F43.9 STRESS: ICD-10-CM

## 2023-12-01 DIAGNOSIS — Z51.81 ENCOUNTER FOR THERAPEUTIC DRUG MONITORING: ICD-10-CM

## 2023-12-01 DIAGNOSIS — R63.2 BINGE EATING: Primary | ICD-10-CM

## 2023-12-01 PROCEDURE — 3008F BODY MASS INDEX DOCD: CPT | Performed by: INTERNAL MEDICINE

## 2023-12-01 PROCEDURE — 99214 OFFICE O/P EST MOD 30 MIN: CPT | Performed by: INTERNAL MEDICINE

## 2023-12-01 PROCEDURE — 3078F DIAST BP <80 MM HG: CPT | Performed by: INTERNAL MEDICINE

## 2023-12-01 PROCEDURE — 3074F SYST BP LT 130 MM HG: CPT | Performed by: INTERNAL MEDICINE

## 2023-12-26 RX ORDER — PHENTERMINE HYDROCHLORIDE 30 MG/1
30 CAPSULE ORAL EVERY MORNING
Qty: 30 CAPSULE | Refills: 2 | Status: SHIPPED | OUTPATIENT
Start: 2023-12-26

## 2024-01-02 DIAGNOSIS — E66.9 OBESITY (BMI 30-39.9): Primary | ICD-10-CM

## 2024-01-02 RX ORDER — TIRZEPATIDE 2.5 MG/.5ML
2.5 INJECTION, SOLUTION SUBCUTANEOUS WEEKLY
Qty: 3 ML | Refills: 2 | Status: SHIPPED | OUTPATIENT
Start: 2024-01-02 | End: 2024-01-05 | Stop reason: DRUGHIGH

## 2024-01-03 ENCOUNTER — TELEPHONE (OUTPATIENT)
Dept: SURGERY | Facility: CLINIC | Age: 34
End: 2024-01-03

## 2024-01-05 DIAGNOSIS — E66.9 OBESITY (BMI 30-39.9): Primary | ICD-10-CM

## 2024-01-05 RX ORDER — TIRZEPATIDE 7.5 MG/.5ML
7.5 INJECTION, SOLUTION SUBCUTANEOUS WEEKLY
Qty: 3 ML | Refills: 2 | Status: SHIPPED | OUTPATIENT
Start: 2024-01-05 | End: 2024-01-08

## 2024-01-08 DIAGNOSIS — R63.2 BINGE EATING: ICD-10-CM

## 2024-01-08 RX ORDER — TIRZEPATIDE 7.5 MG/.5ML
7.5 INJECTION, SOLUTION SUBCUTANEOUS WEEKLY
Qty: 3 ML | Refills: 2 | Status: SHIPPED | OUTPATIENT
Start: 2024-01-08

## 2024-01-08 RX ORDER — LISDEXAMFETAMINE DIMESYLATE 50 MG
50 CAPSULE ORAL EVERY MORNING
Qty: 30 CAPSULE | Refills: 0 | Status: SHIPPED | OUTPATIENT
Start: 2024-01-08

## 2024-01-17 ENCOUNTER — OFFICE VISIT (OUTPATIENT)
Dept: AUDIOLOGY | Facility: CLINIC | Age: 34
End: 2024-01-17

## 2024-01-17 ENCOUNTER — OFFICE VISIT (OUTPATIENT)
Dept: OTOLARYNGOLOGY | Facility: CLINIC | Age: 34
End: 2024-01-17
Payer: COMMERCIAL

## 2024-01-17 DIAGNOSIS — H91.90 HEARING LOSS, UNSPECIFIED HEARING LOSS TYPE, UNSPECIFIED LATERALITY: Primary | ICD-10-CM

## 2024-01-17 DIAGNOSIS — H91.8X9 OTHER SPECIFIED HEARING LOSS, UNSPECIFIED EAR: Primary | ICD-10-CM

## 2024-01-17 DIAGNOSIS — M26.609 TMJ (TEMPOROMANDIBULAR JOINT DISORDER): ICD-10-CM

## 2024-01-17 PROCEDURE — 92504 EAR MICROSCOPY EXAMINATION: CPT | Performed by: STUDENT IN AN ORGANIZED HEALTH CARE EDUCATION/TRAINING PROGRAM

## 2024-01-17 PROCEDURE — 92567 TYMPANOMETRY: CPT | Performed by: AUDIOLOGIST

## 2024-01-17 PROCEDURE — 99204 OFFICE O/P NEW MOD 45 MIN: CPT | Performed by: STUDENT IN AN ORGANIZED HEALTH CARE EDUCATION/TRAINING PROGRAM

## 2024-01-17 PROCEDURE — 92557 COMPREHENSIVE HEARING TEST: CPT | Performed by: AUDIOLOGIST

## 2024-01-17 NOTE — PROGRESS NOTES
Jennifer Donato is a 33 year old female.   Chief Complaint   Patient presents with    Hearing Loss     Left hearing loss        ASSESSMENT AND PLAN:   1. Hearing loss, unspecified hearing loss type, unspecified laterality    - Audiology Referral - [facility])    2. TMJ (temporomandibular joint disorder)  33-year-old who presents with feeling she has hearing loss.  She works in the emergency room.  She does grind and clench her teeth.  No other otologic issues and her history    Went over her audiogram that shows a small dip at 4000 Hz just to 20 dB in each ear is slightly worse than the left she had normal tympanograms.  Normal otologic exam.  She has a mobility of her jaw joint with opening    Went over her largely normal audiogram.  Slight dip in her hearing at 4000 Hz but still within the normal range.  Discussed that chronic TMJ can cause otologic symptoms of fullness in her ears which she may be experiencing as a progression of the TMJ.  She has been dealing with this issue and I encouraged her to more look into this issue to help relieve some of her symptoms.  Reassured her the largely normal hearing test.  She can follow-up if needed    The patient indicates understanding of these issues and agrees to the plan.      EXAM:   LMP 05/25/2023 (Exact Date)     Pertinent exam findings may also be noted above in assessment and plan     System Details   Skin Inspection - Normal.   Constitutional Overall appearance - Normal.   Head/Face Symmetric, TMJ tenderness not present    Eyes EOMI, PERRL   Right ear:  Canal clear, TM intact, no RODRICK   Left ear:  Canal clear, TM intact, no RODRICK   Nose: Septum midline, inferior turbinates not enlarged, nasal valves without collapse    Oral cavity/Oropharynx: No lesions or masses on inspection or palpation, tonsils symmetric    Neck: Soft without LAD, thyroid not enlarged  Voice clear/ no stridor   Other:      Scopes and Procedures:             Current  Outpatient Medications   Medication Sig Dispense Refill    VYVANSE 50 MG Oral Cap Take 1 capsule (50 mg total) by mouth every morning. 30 capsule 0    Tirzepatide (MOUNJARO) 7.5 MG/0.5ML Subcutaneous Solution Pen-injector Inject 7.5 mg into the skin once a week. 3 mL 2    Phentermine HCl 30 MG Oral Cap TAKE 1 CAPSULE BY MOUTH EVERY DAY IN THE MORNING 30 capsule 2    Multiple Vitamins-Minerals (BIOTIN PLUS/CALCIUM/VIT D3) Oral Tab Take by mouth.      COLLAGEN-VITAMIN C OR Take by mouth.      Omeprazole 40 MG Oral Capsule Delayed Release Take 1 capsule (40 mg total) by mouth daily. Take 1 capsule by mouth daily before breakfast. 30 capsule 11      Past Medical History:   Diagnosis Date    COVID-19     History of cholecystectomy 02/17/2023    Obesity (BMI 30-39.9)       Social History:  Social History     Socioeconomic History    Marital status:     Number of children: 1   Tobacco Use    Smoking status: Never    Smokeless tobacco: Never   Vaping Use    Vaping Use: Never used   Substance and Sexual Activity    Alcohol use: No    Drug use: Never     Social Determinants of Health     Financial Resource Strain: Low Risk  (2/20/2023)    Financial Resource Strain     Difficulty of Paying Living Expenses: Not hard at all     Med Affordability: No   Transportation Needs: No Transportation Needs (2/20/2023)    Transportation Needs     Lack of Transportation: No          Bruno Mora MD  1/17/2024  10:29 AM

## 2024-02-01 DIAGNOSIS — R63.2 BINGE EATING: ICD-10-CM

## 2024-02-01 DIAGNOSIS — E66.9 OBESITY (BMI 30-39.9): Primary | ICD-10-CM

## 2024-02-01 RX ORDER — LISDEXAMFETAMINE DIMESYLATE CAPSULES 50 MG/1
50 CAPSULE ORAL EVERY MORNING
Qty: 30 CAPSULE | Refills: 0 | Status: SHIPPED | OUTPATIENT
Start: 2024-02-01

## 2024-02-01 RX ORDER — TIRZEPATIDE 10 MG/.5ML
10 INJECTION, SOLUTION SUBCUTANEOUS WEEKLY
Qty: 3 ML | Refills: 2 | Status: SHIPPED | OUTPATIENT
Start: 2024-02-01

## 2024-02-05 DIAGNOSIS — R63.2 BINGE EATING: ICD-10-CM

## 2024-02-05 RX ORDER — LISDEXAMFETAMINE DIMESYLATE 50 MG
50 CAPSULE ORAL EVERY MORNING
Qty: 30 CAPSULE | Refills: 0 | OUTPATIENT
Start: 2024-02-05

## 2024-02-06 DIAGNOSIS — E66.9 OBESITY (BMI 30-39.9): ICD-10-CM

## 2024-02-06 RX ORDER — TIRZEPATIDE 10 MG/.5ML
10 INJECTION, SOLUTION SUBCUTANEOUS WEEKLY
Qty: 2 ML | Refills: 0 | OUTPATIENT
Start: 2024-02-06

## 2024-02-06 RX ORDER — TIRZEPATIDE 10 MG/.5ML
10 INJECTION, SOLUTION SUBCUTANEOUS WEEKLY
Qty: 3 ML | Refills: 2 | Status: SHIPPED | OUTPATIENT
Start: 2024-02-06

## 2024-02-07 ENCOUNTER — TELEPHONE (OUTPATIENT)
Dept: SURGERY | Facility: CLINIC | Age: 34
End: 2024-02-07

## 2024-02-07 RX ORDER — TIRZEPATIDE 10 MG/.5ML
10 INJECTION, SOLUTION SUBCUTANEOUS WEEKLY
Qty: 2 ML | Refills: 0 | Status: SHIPPED | OUTPATIENT
Start: 2024-02-07 | End: 2024-02-07

## 2024-02-07 RX ORDER — TIRZEPATIDE 10 MG/.5ML
10 INJECTION, SOLUTION SUBCUTANEOUS WEEKLY
Qty: 2 ML | Refills: 0 | Status: SHIPPED | OUTPATIENT
Start: 2024-02-07

## 2024-03-04 DIAGNOSIS — E66.9 OBESITY (BMI 30-39.9): Primary | ICD-10-CM

## 2024-03-04 DIAGNOSIS — R63.2 BINGE EATING: ICD-10-CM

## 2024-03-04 RX ORDER — LISDEXAMFETAMINE DIMESYLATE CAPSULES 50 MG/1
50 CAPSULE ORAL EVERY MORNING
Qty: 30 CAPSULE | Refills: 0 | Status: SHIPPED | OUTPATIENT
Start: 2024-03-04

## 2024-03-04 RX ORDER — TIRZEPATIDE 12.5 MG/.5ML
12.5 INJECTION, SOLUTION SUBCUTANEOUS WEEKLY
Qty: 2 ML | Refills: 3 | Status: SHIPPED | OUTPATIENT
Start: 2024-03-04

## 2024-03-04 RX ORDER — TIRZEPATIDE 12.5 MG/.5ML
12.5 INJECTION, SOLUTION SUBCUTANEOUS WEEKLY
Qty: 2 ML | Refills: 3 | Status: SHIPPED | OUTPATIENT
Start: 2024-03-04 | End: 2024-03-04

## 2024-03-11 DIAGNOSIS — E66.9 OBESITY (BMI 30-39.9): ICD-10-CM

## 2024-03-11 RX ORDER — TIRZEPATIDE 12.5 MG/.5ML
12.5 INJECTION, SOLUTION SUBCUTANEOUS WEEKLY
Qty: 2 ML | Refills: 3 | Status: SHIPPED | OUTPATIENT
Start: 2024-03-11

## 2024-03-13 DIAGNOSIS — E66.9 OBESITY (BMI 30-39.9): ICD-10-CM

## 2024-03-14 ENCOUNTER — PATIENT MESSAGE (OUTPATIENT)
Dept: SURGERY | Facility: CLINIC | Age: 34
End: 2024-03-14

## 2024-03-14 RX ORDER — TIRZEPATIDE 12.5 MG/.5ML
INJECTION, SOLUTION SUBCUTANEOUS
Qty: 2 ML | Refills: 0 | Status: SHIPPED | OUTPATIENT
Start: 2024-03-14

## 2024-03-14 RX ORDER — TIRZEPATIDE 12.5 MG/.5ML
12.5 INJECTION, SOLUTION SUBCUTANEOUS WEEKLY
Qty: 2 ML | Refills: 3 | Status: SHIPPED | OUTPATIENT
Start: 2024-03-14

## 2024-04-02 DIAGNOSIS — E66.9 OBESITY (BMI 30-39.9): Primary | ICD-10-CM

## 2024-04-02 DIAGNOSIS — R63.2 BINGE EATING: ICD-10-CM

## 2024-04-02 RX ORDER — TIRZEPATIDE 12.5 MG/.5ML
12.5 INJECTION, SOLUTION SUBCUTANEOUS WEEKLY
Qty: 2 ML | Refills: 2 | Status: SHIPPED | OUTPATIENT
Start: 2024-04-02

## 2024-04-02 RX ORDER — LISDEXAMFETAMINE DIMESYLATE 50 MG
50 CAPSULE ORAL EVERY MORNING
Qty: 30 CAPSULE | Refills: 0 | Status: SHIPPED | OUTPATIENT
Start: 2024-04-02

## 2024-04-03 ENCOUNTER — NURSE ONLY (OUTPATIENT)
Dept: INTERNAL MEDICINE CLINIC | Facility: HOSPITAL | Age: 34
End: 2024-04-03
Attending: EMERGENCY MEDICINE

## 2024-04-03 DIAGNOSIS — Z00.00 WELLNESS EXAMINATION: Primary | ICD-10-CM

## 2024-04-03 PROCEDURE — 86480 TB TEST CELL IMMUN MEASURE: CPT

## 2024-04-04 LAB
M TB IFN-G CD4+ T-CELLS BLD-ACNC: 0.09 IU/ML
M TB TUBERC IFN-G BLD QL: NEGATIVE
M TB TUBERC IGNF/MITOGEN IGNF CONTROL: >10 IU/ML
QFT TB1 AG MINUS NIL: 0.03 IU/ML
QFT TB2 AG MINUS NIL: 0.05 IU/ML

## 2024-04-18 ENCOUNTER — OFFICE VISIT (OUTPATIENT)
Dept: SURGERY | Facility: CLINIC | Age: 34
End: 2024-04-18
Payer: COMMERCIAL

## 2024-04-18 VITALS
HEART RATE: 83 BPM | HEIGHT: 61.4 IN | DIASTOLIC BLOOD PRESSURE: 68 MMHG | BODY MASS INDEX: 29.82 KG/M2 | SYSTOLIC BLOOD PRESSURE: 112 MMHG | WEIGHT: 160 LBS | OXYGEN SATURATION: 99 %

## 2024-04-18 DIAGNOSIS — Z51.81 ENCOUNTER FOR THERAPEUTIC DRUG MONITORING: ICD-10-CM

## 2024-04-18 DIAGNOSIS — E66.3 OVERWEIGHT (BMI 25.0-29.9): ICD-10-CM

## 2024-04-18 DIAGNOSIS — R63.2 BINGE EATING: Primary | ICD-10-CM

## 2024-04-18 DIAGNOSIS — F43.9 STRESS: ICD-10-CM

## 2024-04-18 PROCEDURE — 3078F DIAST BP <80 MM HG: CPT | Performed by: INTERNAL MEDICINE

## 2024-04-18 PROCEDURE — 99214 OFFICE O/P EST MOD 30 MIN: CPT | Performed by: INTERNAL MEDICINE

## 2024-04-18 PROCEDURE — 3008F BODY MASS INDEX DOCD: CPT | Performed by: INTERNAL MEDICINE

## 2024-04-18 PROCEDURE — 3074F SYST BP LT 130 MM HG: CPT | Performed by: INTERNAL MEDICINE

## 2024-04-18 NOTE — PROGRESS NOTES
[facility], [facility], [place]  [address] [facility] [address] [place] [id number]  Dept: [phone]       Patient:  Jennifer Donato  :      1990  MRN:      [id number]    Chief Complaint:    Chief Complaint   Patient presents with    Follow - Up    Weight Management     Weight check        SUBJECTIVE     History of Present Illness:  Jennifer is being seen today for a follow-up for non surgical weight loss.     Past Medical History:   Past Medical History:    COVID-19    History of cholecystectomy    Obesity (BMI 30-39.9)        Comorbidities:  Back pain-Improvement?  yes, Joint pain-Improvement?  yes, IRASEMA-Improvement?  yes, and Snoring-Improvement?  yes    OBJECTIVE     Vitals: /68   Pulse 83   Ht 5' 1.4\" (1.56 m)   Wt 160 lb (72.6 kg)   LMP 2023 (Exact Date)   SpO2 99%   BMI 29.84 kg/m²     Initial weight loss: -18   Total weight loss: -26   Start weight: 185    Wt Readings from Last 3 Encounters:   24 160 lb (72.6 kg)   23 178 lb 4.8 oz (80.9 kg)   23 180 lb (81.6 kg)       Patient Medications:    Current Outpatient Medications   Medication Sig Dispense Refill    VYVANSE 50 MG Oral Cap Take 1 capsule (50 mg total) by mouth every morning. 30 capsule 0    Tirzepatide-Weight Management (ZEPBOUND) 12.5 MG/0.5ML Subcutaneous Solution Auto-injector Inject 12.5 mg into the skin once a week. 2 mL 2    Phentermine HCl 30 MG Oral Cap TAKE 1 CAPSULE BY MOUTH EVERY DAY IN THE MORNING 30 capsule 2    Multiple Vitamins-Minerals (BIOTIN PLUS/CALCIUM/VIT D3) Oral Tab Take by mouth.      COLLAGEN-VITAMIN C OR Take by mouth.      Omeprazole 40 MG Oral Capsule Delayed Release Take 1 capsule (40 mg total) by mouth daily. Take 1 capsule by mouth daily before breakfast. 30 capsule 11     Allergies:  Patient has no known allergies.     Social History:    Social History     Socioeconomic History    Marital status:      Spouse name: Not on  file    Number of children: 1    Years of education: Not on file    Highest education level: Not on file   Occupational History    Not on file   Tobacco Use    Smoking status: Never    Smokeless tobacco: Never   Vaping Use    Vaping status: Never Used   Substance and Sexual Activity    Alcohol use: No    Drug use: Never    Sexual activity: Not on file   Other Topics Concern    Caffeine Concern Not Asked    Exercise Not Asked    Seat Belt Not Asked    Special Diet Not Asked    Stress Concern Not Asked    Weight Concern Not Asked   Social History Narrative    Not on file     Social Determinants of Health     Financial Resource Strain: Low Risk  (2023)    Financial Resource Strain     Difficulty of Paying Living Expenses: Not hard at all     Med Affordability: No   Food Insecurity: Not on file   Transportation Needs: No Transportation Needs (2023)    Transportation Needs     Lack of Transportation: No   Physical Activity: Not on file   Stress: Not on file   Social Connections: Not on file   Housing Stability: Not on file     Surgical History:    Past Surgical History:   Procedure Laterality Date                Cholecystectomy  2023    Ercp,insert stent,biliary/panc  02/15/2023    Laparoscopic cholecystectomy  2023    Leep       Family History:    Family History   Problem Relation Age of Onset    Lipids Father     Diabetes Mother     Diabetes Maternal Grandmother     Hypertension Maternal Grandmother     Stroke Maternal Grandmother     Other (Other) Maternal Grandmother         kidney disease    Diabetes Other     Breast Cancer Neg     Ovarian Cancer Neg     Colon Cancer Neg     Uterine Cancer Neg        Food Journal  Reviewed and Discussed:       Patient has a Food Journal?: yes   Patient is reading nutrition labels?  yes  Average Caloric Intake:     Average CHO Intake: 130  Is patient exercising? yes  Type of exercise? walking    Eating Habits  Patient states the  following:  Eats 2 meal(s) per day  Length of time it takes to consume a meal:  20  # of snacks per day: 1 Type of snacks:  trail mix  Amount of soda consumption per day:    Amount of water (in ounces) per day:  64  Drinking between meals only:  yes  Toughest challenge:  fatigue    Nutritional Goals  Limit carbohydrates to 100 gms per day, Eat 100-200 calories within 1 hour of waking , and Eat 3-4 cups of fresh fruits or vegetables daily    Behavior Modifications Reviewed and Discussed  Eat breakfast, Eat 3 meals per day, Plan meals in advance, Read nutrition labels, Drink 64 oz of water per day, Maintain a daily food journal, No drinking 30 minutes before or after meals, Utlize portion control strategies to reduce calorie intake, Identify triggers for eating and manage cues, and Eat slowly and take 20 to 30 minutes to complete each meal    Exercise Goals Reviewed and Discussed    Continue walking    ROS:    Constitutional: positive for fatigue  Respiratory: negative  Cardiovascular: negative  Gastrointestinal: negative  Musculoskeletal:positive for stiff joints  Neurological: negative  Behavioral/Psych: positive for depression  Endocrine: negative  All other systems were reviewed and are negative    Physical Exam:   General appearance: alert, appears stated age, cooperative, and mildly obese  Head: Normocephalic, without obvious abnormality, atraumatic  Back: symmetric, no curvature. ROM normal. No CVA tenderness.  Lungs: clear to auscultation bilaterally  Heart: S1, S2 normal, no murmur, click, rub or gallop, regular rate and rhythm  Abdomen: soft, non-tender; bowel sounds normal; no masses,  no organomegaly  Extremities: extremities normal, atraumatic, no cyanosis or edema  Pulses: 2+ and symmetric  Skin: Skin color, texture, turgor normal. No rashes or lesions  Neurologic: Grossly normal    ASSESSMENT       Encounter Diagnosis(ses):   Encounter Diagnoses   Name Primary?    Binge eating Yes    Stress      Encounter for therapeutic drug monitoring     Overweight (BMI 25.0-29.9)        PLAN     Patient is not interested in bariatric surgery. Patient desires to pursue traditional weight loss at this time.      Stress: increased  Follow up with therapist as scheduled      Goals for next month:  1. Keep a food log.  2. Drink 48-64 ounces of non-caloric beverages per day. No fruit juices or regular soda.  3. Increase activity-upper body exercises, walk 10 minutes per day.  4. Increase fruit and vegetable servings to 5-6 per day.      Tolerating Vyvanse  Refill at current dose      Tolerating Zepbound (paying cash)      Diagnoses and all orders for this visit:    Binge eating    Stress    Encounter for therapeutic drug monitoring    Overweight (BMI 25.0-29.9)          Larry Goldman MD

## 2024-05-02 DIAGNOSIS — R63.2 BINGE EATING: ICD-10-CM

## 2024-05-02 RX ORDER — LISDEXAMFETAMINE DIMESYLATE 50 MG
50 CAPSULE ORAL EVERY MORNING
Qty: 30 CAPSULE | Refills: 0 | Status: SHIPPED | OUTPATIENT
Start: 2024-05-02

## 2024-05-28 DIAGNOSIS — R63.2 BINGE EATING: ICD-10-CM

## 2024-05-28 RX ORDER — LISDEXAMFETAMINE DIMESYLATE 50 MG
50 CAPSULE ORAL EVERY MORNING
Qty: 30 CAPSULE | Refills: 0 | Status: SHIPPED | OUTPATIENT
Start: 2024-05-28

## 2024-06-24 DIAGNOSIS — R63.2 BINGE EATING: ICD-10-CM

## 2024-06-24 RX ORDER — LISDEXAMFETAMINE DIMESYLATE 50 MG
50 CAPSULE ORAL EVERY MORNING
Qty: 30 CAPSULE | Refills: 0 | Status: SHIPPED | OUTPATIENT
Start: 2024-06-24

## 2024-07-18 DIAGNOSIS — R63.2 BINGE EATING: ICD-10-CM

## 2024-07-18 RX ORDER — LISDEXAMFETAMINE DIMESYLATE 50 MG
50 CAPSULE ORAL EVERY MORNING
Qty: 30 CAPSULE | Refills: 0 | Status: SHIPPED | OUTPATIENT
Start: 2024-07-18

## 2024-07-18 RX ORDER — TIRZEPATIDE 12.5 MG/.5ML
12.5 INJECTION, SOLUTION SUBCUTANEOUS WEEKLY
Qty: 2 ML | Refills: 0 | Status: SHIPPED | OUTPATIENT
Start: 2024-07-18

## 2024-07-19 DIAGNOSIS — R63.2 BINGE EATING: Primary | ICD-10-CM

## 2024-07-19 RX ORDER — LISDEXAMFETAMINE DIMESYLATE 50 MG/1
50 CAPSULE ORAL DAILY
Qty: 30 CAPSULE | Refills: 0 | Status: SHIPPED | OUTPATIENT
Start: 2024-07-19 | End: 2024-08-18

## 2024-07-19 RX ORDER — LISDEXAMFETAMINE DIMESYLATE 50 MG/1
50 CAPSULE ORAL DAILY
Qty: 30 CAPSULE | Refills: 0 | Status: SHIPPED | OUTPATIENT
Start: 2024-08-19 | End: 2024-09-18

## 2024-07-19 RX ORDER — LISDEXAMFETAMINE DIMESYLATE 50 MG
50 CAPSULE ORAL EVERY MORNING
Qty: 30 CAPSULE | Refills: 0 | OUTPATIENT
Start: 2024-07-19

## 2024-07-19 RX ORDER — LISDEXAMFETAMINE DIMESYLATE 50 MG/1
50 CAPSULE ORAL DAILY
Qty: 30 CAPSULE | Refills: 0 | Status: SHIPPED | OUTPATIENT
Start: 2024-09-19 | End: 2024-10-19

## 2024-08-13 RX ORDER — TIRZEPATIDE 12.5 MG/.5ML
12.5 INJECTION, SOLUTION SUBCUTANEOUS WEEKLY
Qty: 2 ML | Refills: 0 | Status: SHIPPED | OUTPATIENT
Start: 2024-08-13

## 2024-08-26 ENCOUNTER — OFFICE VISIT (OUTPATIENT)
Dept: INTERNAL MEDICINE CLINIC | Facility: CLINIC | Age: 34
End: 2024-08-26
Payer: COMMERCIAL

## 2024-08-26 VITALS
HEART RATE: 93 BPM | OXYGEN SATURATION: 98 % | DIASTOLIC BLOOD PRESSURE: 60 MMHG | BODY MASS INDEX: 28.11 KG/M2 | WEIGHT: 150.81 LBS | SYSTOLIC BLOOD PRESSURE: 106 MMHG | HEIGHT: 61.4 IN | TEMPERATURE: 98 F

## 2024-08-26 DIAGNOSIS — M54.9 CHRONIC MIDLINE BACK PAIN, UNSPECIFIED BACK LOCATION: Primary | ICD-10-CM

## 2024-08-26 DIAGNOSIS — R35.0 URINARY FREQUENCY: ICD-10-CM

## 2024-08-26 DIAGNOSIS — G89.29 CHRONIC MIDLINE BACK PAIN, UNSPECIFIED BACK LOCATION: Primary | ICD-10-CM

## 2024-08-26 LAB
BILIRUB UR QL: NEGATIVE
CLARITY UR: CLEAR
COLOR UR: YELLOW
GLUCOSE UR-MCNC: NORMAL MG/DL
HGB UR QL STRIP.AUTO: NEGATIVE
KETONES UR-MCNC: NEGATIVE MG/DL
LEUKOCYTE ESTERASE UR QL STRIP.AUTO: NEGATIVE
NITRITE UR QL STRIP.AUTO: NEGATIVE
PH UR: 6.5 [PH] (ref 5–8)
SP GR UR STRIP: 1.03 (ref 1–1.03)
UROBILINOGEN UR STRIP-ACNC: 2

## 2024-08-26 PROCEDURE — 99215 OFFICE O/P EST HI 40 MIN: CPT | Performed by: FAMILY MEDICINE

## 2024-08-26 PROCEDURE — 3074F SYST BP LT 130 MM HG: CPT | Performed by: FAMILY MEDICINE

## 2024-08-26 PROCEDURE — 87086 URINE CULTURE/COLONY COUNT: CPT | Performed by: FAMILY MEDICINE

## 2024-08-26 PROCEDURE — 3078F DIAST BP <80 MM HG: CPT | Performed by: FAMILY MEDICINE

## 2024-08-26 PROCEDURE — 81003 URINALYSIS AUTO W/O SCOPE: CPT | Performed by: FAMILY MEDICINE

## 2024-08-26 PROCEDURE — 3008F BODY MASS INDEX DOCD: CPT | Performed by: FAMILY MEDICINE

## 2024-08-26 RX ORDER — METHYLPREDNISOLONE 4 MG
TABLET, DOSE PACK ORAL
Qty: 21 EACH | Refills: 0 | Status: SHIPPED | OUTPATIENT
Start: 2024-08-26

## 2024-08-26 RX ORDER — PHENAZOPYRIDINE HYDROCHLORIDE 200 MG/1
200 TABLET, FILM COATED ORAL 3 TIMES DAILY PRN
Qty: 6 TABLET | Refills: 0 | Status: SHIPPED | OUTPATIENT
Start: 2024-08-26 | End: 2024-08-28

## 2024-08-26 RX ORDER — CYCLOBENZAPRINE HCL 5 MG
5 TABLET ORAL NIGHTLY PRN
Qty: 14 TABLET | Refills: 0 | Status: SHIPPED | OUTPATIENT
Start: 2024-08-26

## 2024-08-26 NOTE — PROGRESS NOTES
HPI:     Chief Complaint   Patient presents with    Low Back Pain    Urinary Frequency     Last 2 months.        Jennifer Donato is a 34 year old female presenting for:    Low back pain  -for past 3mo  -daily while at work towards end of shift. Works as Rn in ER  -no n/t/w  -no skin changes  -no trauma/injury      -for past 2mo having urinary frequency, goes almost hourly. +urgency  No dysuria. No hematuria. Having incomplete emptying of bladder  -took keflex 7d w/o resolution. Did not get a UA/Ucx for this medication  -no fever. +chills  Does hold urine often at work    Results for orders placed or performed in visit on 08/26/24   Urinalysis, Routine   Result Value Ref Range    Urine Color Yellow Yellow    Clarity Urine Clear Clear    Spec Gravity 1.029 1.005 - 1.030    Glucose Urine Normal Normal mg/dL    Bilirubin Urine Negative Negative    Ketones Urine Negative Negative mg/dL    Blood Urine Negative Negative    pH Urine 6.5 5.0 - 8.0    Protein Urine Trace (A) Negative mg/dL    Urobilinogen Urine 2 (A) Normal    Nitrite Urine Negative Negative    Leukocyte Esterase Urine Negative Negative    Microscopic Microscopic not indicated    Urine Culture, Routine [E]    Specimen: Urine, clean catch   Result Value Ref Range    Urine Culture No Growth at 18-24 hrs.        Labs:   Lab Results   Component Value Date/Time    A1C 5.1 07/28/2022 10:26 AM      Lab Results   Component Value Date/Time    CHOLEST 146 07/28/2022 10:26 AM    HDL 45 07/28/2022 10:26 AM    TRIG 48 07/28/2022 10:26 AM    LDL 90 07/28/2022 10:26 AM    NONHDLC 101 07/28/2022 10:26 AM       Lab Results   Component Value Date/Time    GLU 86 03/11/2023 11:29 AM     03/11/2023 11:29 AM    K 3.5 03/11/2023 11:29 AM     03/11/2023 11:29 AM    CO2 27.0 03/11/2023 11:29 AM    CREATSERUM 0.76 03/11/2023 11:29 AM    CA 8.9 03/11/2023 11:29 AM    ALB 3.8 03/11/2023 11:29 AM    TP 7.8 03/11/2023 11:29 AM    ALKPHO 72 03/11/2023 11:29 AM    AST  13 (L) 2023 11:29 AM    ALT 23 2023 11:29 AM    BILT 0.5 2023 11:29 AM    TSH 0.739 2022 10:26 AM          Medications:  Current Outpatient Medications   Medication Sig Dispense Refill    cyclobenzaprine 5 MG Oral Tab Take 1 tablet (5 mg total) by mouth nightly as needed. Muscle spasm 14 tablet 0    methylPREDNISolone (MEDROL) 4 MG Oral Tablet Therapy Pack As directed. 21 each 0    lisdexamfetamine (VYVANSE) 50 MG Oral Cap Take 1 capsule (50 mg total) by mouth daily. 30 capsule 0    MOUNJARO 12.5 MG/0.5ML Subcutaneous Solution Pen-injector INJECT 12.5 MG INTO THE SKIN ONCE A WEEK. (Patient not taking: Reported on 2024) 2 mL 0    [START ON 2024] lisdexamfetamine (VYVANSE) 50 MG Oral Cap Take 1 capsule (50 mg total) by mouth daily. (Patient not taking: Reported on 2024) 30 capsule 0    VYVANSE 50 MG Oral Cap TAKE 1 CAPSULE (50 MG TOTAL) BY MOUTH EVERY MORNING. (Patient not taking: Reported on 2024) 30 capsule 0      Past Medical History:    COVID-19    History of cholecystectomy    Obesity (BMI 30-39.9)         Past Surgical History:   Procedure Laterality Date                Cholecystectomy  2023    Ercp,insert stent,biliary/panc  02/15/2023    Laparoscopic cholecystectomy  2023    Leep       No Known Allergies   Social History:  Social History     Socioeconomic History    Marital status:     Number of children: 1   Tobacco Use    Smoking status: Never    Smokeless tobacco: Never   Vaping Use    Vaping status: Never Used   Substance and Sexual Activity    Alcohol use: No    Drug use: Never     Social Determinants of Health     Financial Resource Strain: Low Risk  (2023)    Financial Resource Strain     Difficulty of Paying Living Expenses: Not hard at all     Med Affordability: No   Transportation Needs: No Transportation Needs (2023)    Transportation Needs     Lack of Transportation: No      Family History:  Family  History   Problem Relation Age of Onset    Lipids Father     Diabetes Mother     Diabetes Maternal Grandmother     Hypertension Maternal Grandmother     Stroke Maternal Grandmother     Other (Other) Maternal Grandmother         kidney disease    Diabetes Other     Breast Cancer Neg     Ovarian Cancer Neg     Colon Cancer Neg     Uterine Cancer Neg           REVIEW OF SYSTEMS:   Review of Systems   Constitutional:  Negative for chills, fatigue and fever.   Respiratory:  Negative for cough and shortness of breath.    Cardiovascular:  Negative for chest pain, palpitations and leg swelling.   Gastrointestinal:  Negative for abdominal pain, constipation, diarrhea and vomiting.   Genitourinary:  Positive for frequency and urgency. Negative for dysuria.   Musculoskeletal:  Positive for back pain.   Neurological:  Negative for headaches.            PHYSICAL EXAM:   /60   Pulse 93   Temp 97.7 °F (36.5 °C)   Ht 5' 1.4\" (1.56 m)   Wt 150 lb 12.8 oz (68.4 kg)   LMP 07/06/2024 (Exact Date)   SpO2 98%   Breastfeeding No   BMI 28.12 kg/m²  Estimated body mass index is 28.12 kg/m² as calculated from the following:    Height as of this encounter: 5' 1.4\" (1.56 m).    Weight as of this encounter: 150 lb 12.8 oz (68.4 kg).     Wt Readings from Last 3 Encounters:   08/26/24 150 lb 12.8 oz (68.4 kg)   04/18/24 160 lb (72.6 kg)   12/01/23 178 lb 4.8 oz (80.9 kg)       Physical Exam  Vitals reviewed.   Constitutional:       General: She is not in acute distress.     Appearance: She is well-developed.   Cardiovascular:      Rate and Rhythm: Normal rate and regular rhythm.      Heart sounds: Normal heart sounds. No murmur heard.  Pulmonary:      Effort: Pulmonary effort is normal. No respiratory distress.      Breath sounds: Normal breath sounds.   Abdominal:      General: Bowel sounds are normal. There is no distension.      Palpations: Abdomen is soft.      Tenderness: There is no abdominal tenderness. There is no right CVA  tenderness or left CVA tenderness.   Musculoskeletal:      Right lower leg: No edema.      Left lower leg: No edema.      Comments: Diffuse midline tenderness, most significant in thoracic region   Neurological:      General: No focal deficit present.             ASSESSMENT AND PLAN:   Patient is a 34 year old female who presents primarily presents for:    Diagnoses and all orders for this visit:    Chronic midline back pain, unspecified back location  -     XR CERVICAL SPINE (2-3 VIEWS) (CPT=72040); Future  -     XR THORACIC SPINE (3 VIEWS) (CPT=72072); Future  -     XR LUMBAR SPINE AP LAT FLEX EXT (CPT=72110); Future  -     cyclobenzaprine 5 MG Oral Tab; Take 1 tablet (5 mg total) by mouth nightly as needed. Muscle spasm  -     methylPREDNISolone (MEDROL) 4 MG Oral Tablet Therapy Pack; As directed.  -supportive care PRN    Urinary frequency  -     Urinalysis, Routine; Future  -     Urine Culture, Routine [E]; Future  -     phenazopyridine 200 MG Oral Tab; Take 1 tablet (200 mg total) by mouth 3 (three) times daily as needed for Pain.  -s/p keflex  -possibly IC  -Preventive measures discussed such hydration, frequent urination, avoiding food irritants, post-coital void             Return if symptoms worsen or fail to improve.  Patient indicates understanding of the above recommendations and agrees to the above plan.  Reasurrance and education provided. All questions answered.  Notified to call with any questions, complications, allergies, or worsening or changing symptoms as well as any side effects or complications from the treatments .  Red flags/ ER precautions discussed.    Spent 40 minutes including chart review, reviewing appropriate medical history, evaluating patient, discussing treatment options, counseling and education (diet and exercise), ordering appropriate diagnostic tests and completing documentation.        Meds & Refills for this Visit:  Requested Prescriptions     Signed Prescriptions Disp  Refills    cyclobenzaprine 5 MG Oral Tab 14 tablet 0     Sig: Take 1 tablet (5 mg total) by mouth nightly as needed. Muscle spasm    methylPREDNISolone (MEDROL) 4 MG Oral Tablet Therapy Pack 21 each 0     Sig: As directed.    phenazopyridine 200 MG Oral Tab 6 tablet 0     Sig: Take 1 tablet (200 mg total) by mouth 3 (three) times daily as needed for Pain.       Orders Placed This Encounter   Procedures    Urinalysis, Routine    Urine Culture, Routine [E]       Imaging & Consults:  None    Health Maintenance:  Health Maintenance   Topic Date Due    Annual Physical  07/22/2023    COVID-19 Vaccine (3 - 2023-24 season) 09/01/2023    Pap Smear  01/25/2025    Influenza Vaccine (1) 10/01/2024    DTaP,Tdap,and Td Vaccines (4 - Td or Tdap) 10/05/2031    Annual Depression Screening  Completed    Pneumococcal Vaccine: Birth to 64yrs  Aged Out         Aleksandra Lopez MD

## 2024-08-31 ENCOUNTER — HOSPITAL ENCOUNTER (OUTPATIENT)
Dept: GENERAL RADIOLOGY | Facility: HOSPITAL | Age: 34
Discharge: HOME | End: 2024-08-31
Attending: FAMILY MEDICINE
Payer: COMMERCIAL

## 2024-08-31 DIAGNOSIS — M54.9 CHRONIC MIDLINE BACK PAIN, UNSPECIFIED BACK LOCATION: ICD-10-CM

## 2024-08-31 DIAGNOSIS — G89.29 CHRONIC MIDLINE BACK PAIN, UNSPECIFIED BACK LOCATION: ICD-10-CM

## 2024-08-31 PROCEDURE — 72110 X-RAY EXAM L-2 SPINE 4/>VWS: CPT | Performed by: FAMILY MEDICINE

## 2024-08-31 PROCEDURE — 72040 X-RAY EXAM NECK SPINE 2-3 VW: CPT | Performed by: FAMILY MEDICINE

## 2024-08-31 PROCEDURE — 72072 X-RAY EXAM THORAC SPINE 3VWS: CPT | Performed by: FAMILY MEDICINE

## 2024-09-01 ENCOUNTER — PATIENT MESSAGE (OUTPATIENT)
Dept: INTERNAL MEDICINE CLINIC | Facility: CLINIC | Age: 34
End: 2024-09-01

## 2024-09-01 DIAGNOSIS — R35.0 URINARY FREQUENCY: ICD-10-CM

## 2024-09-01 DIAGNOSIS — G89.29 CHRONIC MIDLINE BACK PAIN, UNSPECIFIED BACK LOCATION: Primary | ICD-10-CM

## 2024-09-01 DIAGNOSIS — M54.9 CHRONIC MIDLINE BACK PAIN, UNSPECIFIED BACK LOCATION: Primary | ICD-10-CM

## 2024-09-01 DIAGNOSIS — N39.3 STRESS INCONTINENCE: ICD-10-CM

## 2024-09-03 NOTE — TELEPHONE ENCOUNTER
Patient agreed to physical therapy. Patient also requesting therapy for pelvic floor. Is it OK to include pelvic floor therapy?

## 2024-09-03 NOTE — TELEPHONE ENCOUNTER
From: Jennifer Donato  To: Aleksandra Lopez  Sent: 9/1/2024 7:44 AM CDT  Subject: Physical Therapy     Janeth Dr. Hammond, hope my message finds you well. Yes, I would like to move forward with Physical therapy, maybe for pelvic floor too?. Thank you.         Soraida Bautista

## 2024-09-10 DIAGNOSIS — R63.2 BINGE EATING: ICD-10-CM

## 2024-09-10 RX ORDER — TIRZEPATIDE 12.5 MG/.5ML
12.5 INJECTION, SOLUTION SUBCUTANEOUS WEEKLY
Qty: 2 ML | Refills: 0 | Status: SHIPPED | OUTPATIENT
Start: 2024-09-10

## 2024-09-11 RX ORDER — LISDEXAMFETAMINE DIMESYLATE 50 MG/1
50 CAPSULE ORAL DAILY
Qty: 30 CAPSULE | Refills: 0 | Status: SHIPPED | OUTPATIENT
Start: 2024-09-11 | End: 2024-10-11

## 2024-09-20 ENCOUNTER — PATIENT MESSAGE (OUTPATIENT)
Dept: INTERNAL MEDICINE CLINIC | Facility: CLINIC | Age: 34
End: 2024-09-20

## 2024-09-25 NOTE — TELEPHONE ENCOUNTER
From: Jennifer Donato  To: Aleksandra Lopez  Sent: 9/20/2024 5:16 AM CDT  Subject: Physical therapy     Hello,    I was able to find a physical therapy location. This is the their information. Thank you.

## 2024-09-25 NOTE — TELEPHONE ENCOUNTER
Spoke with [facility] at [phone] requesting their fax number. [facility] fax number is [phone]. Order faxed to [facility].      [employer] notification message sent.

## 2024-09-26 NOTE — TELEPHONE ENCOUNTER
Connie from [facility] called stating that they have not received any orders for PT  If possible to fax over to [phone]  Ph: [phone]

## 2024-10-01 ENCOUNTER — OFFICE VISIT (OUTPATIENT)
Dept: INTERNAL MEDICINE CLINIC | Facility: CLINIC | Age: 34
End: 2024-10-01
Payer: COMMERCIAL

## 2024-10-01 VITALS
WEIGHT: 150 LBS | HEART RATE: 94 BPM | OXYGEN SATURATION: 98 % | SYSTOLIC BLOOD PRESSURE: 112 MMHG | BODY MASS INDEX: 27.96 KG/M2 | TEMPERATURE: 98 F | DIASTOLIC BLOOD PRESSURE: 68 MMHG | HEIGHT: 61.4 IN

## 2024-10-01 DIAGNOSIS — M25.50 POLYARTHRALGIA: ICD-10-CM

## 2024-10-01 DIAGNOSIS — R23.3 EASY BRUISING: ICD-10-CM

## 2024-10-01 DIAGNOSIS — Z00.00 WELLNESS EXAMINATION: Primary | ICD-10-CM

## 2024-10-01 DIAGNOSIS — R53.82 CHRONIC FATIGUE: ICD-10-CM

## 2024-10-02 RX ORDER — TIRZEPATIDE 12.5 MG/.5ML
12.5 INJECTION, SOLUTION SUBCUTANEOUS WEEKLY
Qty: 2 ML | Refills: 0 | Status: SHIPPED | OUTPATIENT
Start: 2024-10-02

## 2024-10-05 ENCOUNTER — LAB ENCOUNTER (OUTPATIENT)
Dept: LAB | Facility: HOSPITAL | Age: 34
End: 2024-10-05
Attending: FAMILY MEDICINE
Payer: COMMERCIAL

## 2024-10-05 DIAGNOSIS — R53.82 CHRONIC FATIGUE: ICD-10-CM

## 2024-10-05 DIAGNOSIS — Z00.00 WELLNESS EXAMINATION: ICD-10-CM

## 2024-10-05 DIAGNOSIS — R23.3 EASY BRUISING: ICD-10-CM

## 2024-10-05 DIAGNOSIS — M25.50 POLYARTHRALGIA: ICD-10-CM

## 2024-10-05 LAB
ALBUMIN SERPL-MCNC: 4.5 G/DL (ref 3.2–4.8)
ALBUMIN/GLOB SERPL: 1.6 {RATIO} (ref 1–2)
ALP LIVER SERPL-CCNC: 60 U/L
ALT SERPL-CCNC: 10 U/L
ANION GAP SERPL CALC-SCNC: 7 MMOL/L (ref 0–18)
APTT PPP: 30.7 SECONDS (ref 23–36)
AST SERPL-CCNC: 15 U/L (ref ?–34)
BASOPHILS # BLD AUTO: 0.05 X10(3) UL (ref 0–0.2)
BASOPHILS NFR BLD AUTO: 0.9 %
BILIRUB SERPL-MCNC: 0.5 MG/DL (ref 0.3–1.2)
BUN BLD-MCNC: 8 MG/DL (ref 9–23)
BUN/CREAT SERPL: 12.5 (ref 10–20)
CALCIUM BLD-MCNC: 9.2 MG/DL (ref 8.7–10.4)
CHLORIDE SERPL-SCNC: 107 MMOL/L (ref 98–112)
CHOLEST SERPL-MCNC: 148 MG/DL (ref ?–200)
CO2 SERPL-SCNC: 27 MMOL/L (ref 21–32)
CREAT BLD-MCNC: 0.64 MG/DL
CRP SERPL-MCNC: <0.4 MG/DL (ref ?–1)
DEPRECATED HBV CORE AB SER IA-ACNC: 4 NG/ML
DEPRECATED RDW RBC AUTO: 42.4 FL (ref 35.1–46.3)
EGFRCR SERPLBLD CKD-EPI 2021: 119 ML/MIN/1.73M2 (ref 60–?)
EOSINOPHIL # BLD AUTO: 0.15 X10(3) UL (ref 0–0.7)
EOSINOPHIL NFR BLD AUTO: 2.6 %
ERYTHROCYTE [DISTWIDTH] IN BLOOD BY AUTOMATED COUNT: 14 % (ref 11–15)
ERYTHROCYTE [SEDIMENTATION RATE] IN BLOOD: 19 MM/HR
EST. AVERAGE GLUCOSE BLD GHB EST-MCNC: 103 MG/DL (ref 68–126)
FASTING PATIENT LIPID ANSWER: YES
FASTING STATUS PATIENT QL REPORTED: YES
GLOBULIN PLAS-MCNC: 2.8 G/DL (ref 2–3.5)
GLUCOSE BLD-MCNC: 87 MG/DL (ref 70–99)
HBA1C MFR BLD: 5.2 % (ref ?–5.7)
HCT VFR BLD AUTO: 34.9 %
HDLC SERPL-MCNC: 51 MG/DL (ref 40–59)
HGB BLD-MCNC: 11.4 G/DL
IMM GRANULOCYTES # BLD AUTO: 0.01 X10(3) UL (ref 0–1)
IMM GRANULOCYTES NFR BLD: 0.2 %
INR BLD: 0.94 (ref 0.8–1.2)
IRON SATN MFR SERPL: 7 %
IRON SERPL-MCNC: 31 UG/DL
LDLC SERPL CALC-MCNC: 78 MG/DL (ref ?–100)
LYMPHOCYTES # BLD AUTO: 2 X10(3) UL (ref 1–4)
LYMPHOCYTES NFR BLD AUTO: 34.7 %
MCH RBC QN AUTO: 27.1 PG (ref 26–34)
MCHC RBC AUTO-ENTMCNC: 32.7 G/DL (ref 31–37)
MCV RBC AUTO: 83.1 FL
MONOCYTES # BLD AUTO: 0.45 X10(3) UL (ref 0.1–1)
MONOCYTES NFR BLD AUTO: 7.8 %
NEUTROPHILS # BLD AUTO: 3.11 X10 (3) UL (ref 1.5–7.7)
NEUTROPHILS # BLD AUTO: 3.11 X10(3) UL (ref 1.5–7.7)
NEUTROPHILS NFR BLD AUTO: 53.8 %
NONHDLC SERPL-MCNC: 97 MG/DL (ref ?–130)
OSMOLALITY SERPL CALC.SUM OF ELEC: 290 MOSM/KG (ref 275–295)
PLATELET # BLD AUTO: 312 10(3)UL (ref 150–450)
POTASSIUM SERPL-SCNC: 3.7 MMOL/L (ref 3.5–5.1)
PROT SERPL-MCNC: 7.3 G/DL (ref 5.7–8.2)
PROTHROMBIN TIME: 13.1 SECONDS (ref 11.6–14.8)
RBC # BLD AUTO: 4.2 X10(6)UL
RHEUMATOID FACT SERPL-ACNC: 4.4 IU/ML (ref ?–14)
SODIUM SERPL-SCNC: 141 MMOL/L (ref 136–145)
TIBC SERPL-MCNC: 416 UG/DL (ref 250–425)
TRANSFERRIN SERPL-MCNC: 279 MG/DL (ref 250–380)
TRIGL SERPL-MCNC: 102 MG/DL (ref 30–149)
TSI SER-ACNC: 2.44 MIU/ML (ref 0.55–4.78)
VIT B12 SERPL-MCNC: 654 PG/ML (ref 211–911)
VIT D+METAB SERPL-MCNC: 27.3 NG/ML (ref 30–100)
VLDLC SERPL CALC-MCNC: 16 MG/DL (ref 0–30)
WBC # BLD AUTO: 5.8 X10(3) UL (ref 4–11)

## 2024-10-05 PROCEDURE — 82607 VITAMIN B-12: CPT | Performed by: FAMILY MEDICINE

## 2024-10-05 PROCEDURE — 83036 HEMOGLOBIN GLYCOSYLATED A1C: CPT | Performed by: FAMILY MEDICINE

## 2024-10-05 PROCEDURE — 86038 ANTINUCLEAR ANTIBODIES: CPT | Performed by: FAMILY MEDICINE

## 2024-10-05 PROCEDURE — 82728 ASSAY OF FERRITIN: CPT | Performed by: FAMILY MEDICINE

## 2024-10-05 PROCEDURE — 80050 GENERAL HEALTH PANEL: CPT | Performed by: FAMILY MEDICINE

## 2024-10-05 PROCEDURE — 82306 VITAMIN D 25 HYDROXY: CPT | Performed by: FAMILY MEDICINE

## 2024-10-05 PROCEDURE — 83540 ASSAY OF IRON: CPT | Performed by: FAMILY MEDICINE

## 2024-10-05 PROCEDURE — 85652 RBC SED RATE AUTOMATED: CPT | Performed by: FAMILY MEDICINE

## 2024-10-05 PROCEDURE — 86431 RHEUMATOID FACTOR QUANT: CPT | Performed by: FAMILY MEDICINE

## 2024-10-05 PROCEDURE — 85610 PROTHROMBIN TIME: CPT | Performed by: FAMILY MEDICINE

## 2024-10-05 PROCEDURE — 86140 C-REACTIVE PROTEIN: CPT | Performed by: FAMILY MEDICINE

## 2024-10-05 PROCEDURE — 85730 THROMBOPLASTIN TIME PARTIAL: CPT | Performed by: FAMILY MEDICINE

## 2024-10-05 PROCEDURE — 80061 LIPID PANEL: CPT | Performed by: FAMILY MEDICINE

## 2024-10-05 PROCEDURE — 84466 ASSAY OF TRANSFERRIN: CPT | Performed by: FAMILY MEDICINE

## 2024-10-08 LAB — NUCLEAR IGG TITR SER IF: NEGATIVE {TITER}

## 2024-10-17 ENCOUNTER — OFFICE VISIT (OUTPATIENT)
Dept: SURGERY | Facility: CLINIC | Age: 34
End: 2024-10-17
Payer: COMMERCIAL

## 2024-10-17 VITALS
HEIGHT: 61.4 IN | SYSTOLIC BLOOD PRESSURE: 100 MMHG | WEIGHT: 151.13 LBS | BODY MASS INDEX: 28.17 KG/M2 | DIASTOLIC BLOOD PRESSURE: 58 MMHG

## 2024-10-17 DIAGNOSIS — Z51.81 ENCOUNTER FOR THERAPEUTIC DRUG MONITORING: ICD-10-CM

## 2024-10-17 DIAGNOSIS — E66.3 OVERWEIGHT (BMI 25.0-29.9): ICD-10-CM

## 2024-10-17 DIAGNOSIS — R63.2 BINGE EATING: Primary | ICD-10-CM

## 2024-10-17 DIAGNOSIS — F43.9 STRESS: ICD-10-CM

## 2024-10-17 PROCEDURE — 3078F DIAST BP <80 MM HG: CPT | Performed by: INTERNAL MEDICINE

## 2024-10-17 PROCEDURE — 99214 OFFICE O/P EST MOD 30 MIN: CPT | Performed by: INTERNAL MEDICINE

## 2024-10-17 PROCEDURE — 3074F SYST BP LT 130 MM HG: CPT | Performed by: INTERNAL MEDICINE

## 2024-10-17 PROCEDURE — 3008F BODY MASS INDEX DOCD: CPT | Performed by: INTERNAL MEDICINE

## 2024-10-17 RX ORDER — LISDEXAMFETAMINE DIMESYLATE 50 MG/1
50 CAPSULE ORAL DAILY
Qty: 30 CAPSULE | Refills: 0 | Status: SHIPPED | OUTPATIENT
Start: 2024-10-17 | End: 2024-11-16

## 2024-10-17 RX ORDER — LISDEXAMFETAMINE DIMESYLATE 50 MG/1
50 CAPSULE ORAL DAILY
Qty: 30 CAPSULE | Refills: 0 | Status: SHIPPED | OUTPATIENT
Start: 2024-11-17 | End: 2024-12-17

## 2024-10-17 RX ORDER — ESCITALOPRAM OXALATE 5 MG/1
5 TABLET ORAL DAILY
Qty: 90 TABLET | Refills: 1 | Status: SHIPPED | OUTPATIENT
Start: 2024-10-17 | End: 2025-01-15

## 2024-10-17 RX ORDER — LISDEXAMFETAMINE DIMESYLATE 50 MG/1
50 CAPSULE ORAL DAILY
Qty: 30 CAPSULE | Refills: 0 | Status: SHIPPED | OUTPATIENT
Start: 2024-12-18 | End: 2025-01-17

## 2024-10-17 NOTE — PROGRESS NOTES
[facility], [facility], [place]  [address]  [facility] [address]  Dept: [phone]       Patient:  Jennifer Donato  :      1990  MRN:      [id number]    Chief Complaint:    Chief Complaint   Patient presents with    Follow - Up    Weight Management       SUBJECTIVE     History of Present Illness:  Jennifer is being seen today for a follow-up for non surgical weight loss.     Past Medical History:   Past Medical History:    COVID-19    History of cholecystectomy    Obesity (BMI 30-39.9)        Comorbidities:  Back pain-Improvement?  yes, Joint pain-Improvement?  yes, IRASEMA-Improvement?  yes, and Snoring-Improvement?  yes    OBJECTIVE     Vitals: /58   Pulse (P) 90   Ht 5' 1.4\" (1.56 m)   Wt 151 lb 1.6 oz (68.5 kg)   LMP 2024 (Exact Date)   SpO2 (P) 100%   BMI 28.18 kg/m²     Initial weight loss: -09   Total weight loss: -35   Start weight: 185    Wt Readings from Last 3 Encounters:   10/17/24 151 lb 1.6 oz (68.5 kg)   10/01/24 150 lb (68 kg)   24 150 lb 12.8 oz (68.4 kg)       Patient Medications:    Current Outpatient Medications   Medication Sig Dispense Refill    lisdexamfetamine (VYVANSE) 50 MG Oral Cap Take 1 capsule (50 mg total) by mouth daily. 30 capsule 0    [START ON 2024] lisdexamfetamine (VYVANSE) 50 MG Oral Cap Take 1 capsule (50 mg total) by mouth daily. 30 capsule 0    [START ON 2024] lisdexamfetamine (VYVANSE) 50 MG Oral Cap Take 1 capsule (50 mg total) by mouth daily. 30 capsule 0    Ferrous Sulfate 325 (65 Fe) MG Oral Tab Take 1 tablet (325 mg total) by mouth in the morning and 1 tablet (325 mg total) before bedtime. 180 tablet 0    MOUNJARO 12.5 MG/0.5ML Subcutaneous Solution Pen-injector INJECT 12.5 MG INTO THE SKIN ONCE A WEEK. 2 mL 0    cyclobenzaprine 5 MG Oral Tab Take 1 tablet (5 mg total) by mouth nightly as needed. Muscle spasm 14 tablet 0    lisdexamfetamine (VYVANSE) 50 MG Oral Cap  Take 1 capsule (50 mg total) by mouth daily. 30 capsule 0     Allergies:  Patient has no known allergies.     Social History:    Social History     Socioeconomic History    Marital status: Single     Spouse name: Not on file    Number of children: 1    Years of education: Not on file    Highest education level: Not on file   Occupational History    Not on file   Tobacco Use    Smoking status: Never    Smokeless tobacco: Never   Vaping Use    Vaping status: Never Used   Substance and Sexual Activity    Alcohol use: No    Drug use: Never    Sexual activity: Not on file   Other Topics Concern    Caffeine Concern Not Asked    Exercise Not Asked    Seat Belt Not Asked    Special Diet Not Asked    Stress Concern Not Asked    Weight Concern Not Asked   Social History Narrative    Not on file     Social Drivers of Health     Financial Resource Strain: Low Risk  (2023)    Financial Resource Strain     Difficulty of Paying Living Expenses: Not hard at all     Med Affordability: No   Food Insecurity: Not on file   Transportation Needs: No Transportation Needs (2023)    Transportation Needs     Lack of Transportation: No   Physical Activity: Not on file   Stress: Not on file   Social Connections: Not on file   Housing Stability: Not on file     Surgical History:    Past Surgical History:   Procedure Laterality Date                Cholecystectomy  2023    Ercp,insert stent,biliary/panc  02/15/2023    Laparoscopic cholecystectomy  2023    Leep       Family History:    Family History   Problem Relation Age of Onset    Lipids Father     Diabetes Mother     Diabetes Maternal Grandmother     Hypertension Maternal Grandmother     Stroke Maternal Grandmother     Other (Other) Maternal Grandmother         kidney disease    Diabetes Other     Breast Cancer Neg     Ovarian Cancer Neg     Colon Cancer Neg     Uterine Cancer Neg        Food Journal  Reviewed and Discussed:       Patient has a  Food Journal?: yes   Patient is reading nutrition labels?  yes  Average Caloric Intake:     Average CHO Intake: 130  Is patient exercising? yes  Type of exercise? walking    Eating Habits  Patient states the following:  Eats 2 meal(s) per day  Length of time it takes to consume a meal:  20  # of snacks per day: 1 Type of snacks:  trail mix  Amount of soda consumption per day:    Amount of water (in ounces) per day:  64  Drinking between meals only:  yes  Toughest challenge:  fatigue    Nutritional Goals  Limit carbohydrates to 100 gms per day, Eat 100-200 calories within 1 hour of waking , and Eat 3-4 cups of fresh fruits or vegetables daily    Behavior Modifications Reviewed and Discussed  Eat breakfast, Eat 3 meals per day, Plan meals in advance, Read nutrition labels, Drink 64 oz of water per day, Maintain a daily food journal, No drinking 30 minutes before or after meals, Utlize portion control strategies to reduce calorie intake, Identify triggers for eating and manage cues, and Eat slowly and take 20 to 30 minutes to complete each meal    Exercise Goals Reviewed and Discussed    Continue walking    ROS:    Constitutional: positive for fatigue  Respiratory: negative  Cardiovascular: negative  Gastrointestinal: negative  Musculoskeletal:positive for stiff joints  Neurological: negative  Behavioral/Psych: positive for depression  Endocrine: negative  All other systems were reviewed and are negative    Physical Exam:   General appearance: alert, appears stated age, cooperative, and mildly obese  Head: Normocephalic, without obvious abnormality, atraumatic  Back: symmetric, no curvature. ROM normal. No CVA tenderness.  Lungs: clear to auscultation bilaterally  Heart: S1, S2 normal, no murmur, click, rub or gallop, regular rate and rhythm  Abdomen: soft, non-tender; bowel sounds normal; no masses,  no organomegaly  Extremities: extremities normal, atraumatic, no cyanosis or edema  Pulses: 2+ and symmetric  Skin:  Skin color, texture, turgor normal. No rashes or lesions  Neurologic: Grossly normal    ASSESSMENT       Encounter Diagnosis(ses):   Encounter Diagnoses   Name Primary?    Binge eating Yes    Stress     Encounter for therapeutic drug monitoring     Overweight (BMI 25.0-29.9)        PLAN     Patient is not interested in bariatric surgery. Patient desires to pursue traditional weight loss at this time.      Stress: increased  Follow up with therapist as scheduled      Goals for next month:  1. Keep a food log.  2. Drink 48-64 ounces of non-caloric beverages per day. No fruit juices or regular soda.  3. Increase activity-upper body exercises, walk 10 minutes per day.  4. Increase fruit and vegetable servings to 5-6 per day.      Tolerating Vyvanse  Refill at current dose      Tolerating Mounjaro (paying cash)    Will start Tirezepatide via [employer]     Start Lexapro 5 mg for stress      Diagnoses and all orders for this visit:    Binge eating  -     lisdexamfetamine (VYVANSE) 50 MG Oral Cap; Take 1 capsule (50 mg total) by mouth daily.  -     lisdexamfetamine (VYVANSE) 50 MG Oral Cap; Take 1 capsule (50 mg total) by mouth daily.  -     lisdexamfetamine (VYVANSE) 50 MG Oral Cap; Take 1 capsule (50 mg total) by mouth daily.    Stress    Encounter for therapeutic drug monitoring    Overweight (BMI 25.0-29.9)          Larry Goldman MD

## 2024-10-25 ENCOUNTER — LAB ENCOUNTER (OUTPATIENT)
Dept: LAB | Facility: HOSPITAL | Age: 34
End: 2024-10-25
Attending: INTERNAL MEDICINE
Payer: COMMERCIAL

## 2024-10-25 ENCOUNTER — OFFICE VISIT (OUTPATIENT)
Dept: OBGYN CLINIC | Facility: CLINIC | Age: 34
End: 2024-10-25
Payer: COMMERCIAL

## 2024-10-25 VITALS
BODY MASS INDEX: 27.75 KG/M2 | SYSTOLIC BLOOD PRESSURE: 112 MMHG | DIASTOLIC BLOOD PRESSURE: 76 MMHG | WEIGHT: 147 LBS | HEIGHT: 61 IN

## 2024-10-25 DIAGNOSIS — F43.9 STRESS: ICD-10-CM

## 2024-10-25 DIAGNOSIS — Z11.3 SCREEN FOR STD (SEXUALLY TRANSMITTED DISEASE): ICD-10-CM

## 2024-10-25 DIAGNOSIS — E66.3 OVERWEIGHT (BMI 25.0-29.9): ICD-10-CM

## 2024-10-25 DIAGNOSIS — Z51.81 ENCOUNTER FOR THERAPEUTIC DRUG MONITORING: ICD-10-CM

## 2024-10-25 DIAGNOSIS — Z01.419 WOMEN'S ANNUAL ROUTINE GYNECOLOGICAL EXAMINATION: Primary | ICD-10-CM

## 2024-10-25 DIAGNOSIS — R63.2 BINGE EATING: ICD-10-CM

## 2024-10-25 DIAGNOSIS — Z12.4 SCREENING FOR CERVICAL CANCER: ICD-10-CM

## 2024-10-25 LAB
HBV SURFACE AG SER-ACNC: <0.1 [IU]/L
HBV SURFACE AG SERPL QL IA: NONREACTIVE
HCV AB SERPL QL IA: NONREACTIVE
T PALLIDUM AB SER QL IA: NONREACTIVE

## 2024-10-25 PROCEDURE — 36415 COLL VENOUS BLD VENIPUNCTURE: CPT

## 2024-10-25 PROCEDURE — 87389 HIV-1 AG W/HIV-1&-2 AB AG IA: CPT

## 2024-10-25 PROCEDURE — 87340 HEPATITIS B SURFACE AG IA: CPT

## 2024-10-25 PROCEDURE — 3008F BODY MASS INDEX DOCD: CPT | Performed by: OBSTETRICS & GYNECOLOGY

## 2024-10-25 PROCEDURE — 3074F SYST BP LT 130 MM HG: CPT | Performed by: OBSTETRICS & GYNECOLOGY

## 2024-10-25 PROCEDURE — 93010 ELECTROCARDIOGRAM REPORT: CPT | Performed by: INTERNAL MEDICINE

## 2024-10-25 PROCEDURE — 86780 TREPONEMA PALLIDUM: CPT

## 2024-10-25 PROCEDURE — 3078F DIAST BP <80 MM HG: CPT | Performed by: OBSTETRICS & GYNECOLOGY

## 2024-10-25 PROCEDURE — 86803 HEPATITIS C AB TEST: CPT

## 2024-10-25 PROCEDURE — 93005 ELECTROCARDIOGRAM TRACING: CPT

## 2024-10-25 PROCEDURE — 99395 PREV VISIT EST AGE 18-39: CPT | Performed by: OBSTETRICS & GYNECOLOGY

## 2024-10-25 RX ORDER — SPIRONOLACTONE 50 MG/1
TABLET, FILM COATED ORAL
COMMUNITY
Start: 2024-10-17

## 2024-10-25 NOTE — PROGRESS NOTES
HPI:   Jennifer Donato is a 34 year old female who presents for an annual/pap.  Pt wants std labs.  Pt going through a divorce.  Pt had irreg periods this past month,  order for pelvic ultrasound entered for pt  Wt Readings from Last 6 Encounters:   10/25/24 147 lb (66.7 kg)   10/17/24 151 lb 1.6 oz (68.5 kg)   10/01/24 150 lb (68 kg)   08/26/24 150 lb 12.8 oz (68.4 kg)   04/18/24 160 lb (72.6 kg)   12/01/23 178 lb 4.8 oz (80.9 kg)     Body mass index is 27.78 kg/m².    Cholesterol, Total (mg/dL)   Date Value   10/05/2024 148   07/28/2022 146   11/09/2020 160     HDL Cholesterol (mg/dL)   Date Value   10/05/2024 51   07/28/2022 45   11/09/2020 47     LDL Cholesterol (mg/dL)   Date Value   10/05/2024 78   07/28/2022 90   11/09/2020 93     AST (U/L)   Date Value   10/05/2024 15   03/11/2023 13 (L)   02/27/2023 15     ALT (U/L)   Date Value   10/05/2024 10   03/11/2023 23   02/27/2023 37        Current Outpatient Medications   Medication Sig Dispense Refill    spironolactone 50 MG Oral Tab TAKE ONE TABLET FOR ONE WEEK THEN INCREASE TO TWICE DAILY STAY HYDRATED WHILE ON MEDICATION      lisdexamfetamine (VYVANSE) 50 MG Oral Cap Take 1 capsule (50 mg total) by mouth daily. 30 capsule 0    MOUNJARO 12.5 MG/0.5ML Subcutaneous Solution Pen-injector INJECT 12.5 MG INTO THE SKIN ONCE A WEEK. 2 mL 0    cyclobenzaprine 5 MG Oral Tab Take 1 tablet (5 mg total) by mouth nightly as needed. Muscle spasm 14 tablet 0    [START ON 11/17/2024] lisdexamfetamine (VYVANSE) 50 MG Oral Cap Take 1 capsule (50 mg total) by mouth daily. (Patient not taking: Reported on 10/25/2024) 30 capsule 0    [START ON 12/18/2024] lisdexamfetamine (VYVANSE) 50 MG Oral Cap Take 1 capsule (50 mg total) by mouth daily. (Patient not taking: Reported on 10/25/2024) 30 capsule 0    escitalopram 5 MG Oral Tab Take 1 tablet (5 mg total) by mouth daily. (Patient not taking: Reported on 10/25/2024) 90 tablet 1    Ferrous Sulfate 325 (65 Fe) MG Oral Tab Take  1 tablet (325 mg total) by mouth in the morning and 1 tablet (325 mg total) before bedtime. (Patient not taking: Reported on 10/25/2024) 180 tablet 0      Past Medical History:    COVID-19    History of cholecystectomy    Obesity (BMI 30-39.9)      Past Surgical History:   Procedure Laterality Date                Cholecystectomy  2023    Ercp,insert stent,biliary/panc  02/15/2023    Laparoscopic cholecystectomy  2023    Leep        Family History   Problem Relation Age of Onset    Lipids Father     Diabetes Mother     Diabetes Maternal Grandmother     Hypertension Maternal Grandmother     Stroke Maternal Grandmother     Other (Other) Maternal Grandmother         kidney disease    Diabetes Other     Breast Cancer Neg     Ovarian Cancer Neg     Colon Cancer Neg     Uterine Cancer Neg       Social History:   Social History     Socioeconomic History    Marital status: Single    Number of children: 1   Tobacco Use    Smoking status: Never    Smokeless tobacco: Never   Vaping Use    Vaping status: Never Used   Substance and Sexual Activity    Alcohol use: No    Drug use: Never     Social Drivers of Health     Financial Resource Strain: Low Risk  (2023)    Financial Resource Strain     Difficulty of Paying Living Expenses: Not hard at all     Med Affordability: No   Transportation Needs: No Transportation Needs (2023)    Transportation Needs     Lack of Transportation: No            REVIEW OF SYSTEMS:   GENERAL: feels well otherwise  SKIN: denies any unusual skin lesions  EYES:denies blurred vision or double vision  HEENT: denies nasal congestion, sinus pain or ST  LUNGS: denies shortness of breath with exertion  CARDIOVASCULAR: denies chest pain on exertion  GI: denies abdominal pain,denies heartburn  : denies dysuria, vaginal discharge or itching,periods regular   MUSCULOSKELETAL: denies back pain  NEURO: denies headaches  PSYCHE: denies depression or anxiety  HEMATOLOGIC:  denies hx of anemia  ENDOCRINE: denies thyroid history  ALL/ASTHMA: denies hx of allergy or asthma    EXAM:   /76   Ht 5' 1\" (1.549 m)   Wt 147 lb (66.7 kg)   LMP 10/18/2024 (Exact Date)   BMI 27.78 kg/m²   Body mass index is 27.78 kg/m².   GENERAL: well developed, well nourished,in no apparent distress  SKIN: no rashes,no suspicious lesions  HEENT: atraumatic, normocephalic  EYES:normal in appearance  NECK: supple,no adenopathy  CHEST: no chest tenderness  BREAST: no dominant or suspicious mass  LUNGS: clear to auscultation  CARDIO: RRR without murmur  GI: good BS's,no masses, HSM or tenderness  :introitus is normal,scant discharge,cervix is pink,no adnexal masses or tenderness, PAP was done     MUSCULOSKELETAL: back is not tender,FROM of the back  EXTREMITIES: no cyanosis, clubbing or edema  NEURO: Oriented times three      ASSESSMENT AND PLAN:   Jennifer Donato is a 34 year old female who presents for a annual/pap.  Pt wants std labs.  Pt going through a divorce.  Pt had irreg periods this past month,  order for pelvic ultrasound entered for pt

## 2024-10-26 LAB
ATRIAL RATE: 88 BPM
P AXIS: 48 DEGREES
P-R INTERVAL: 148 MS
Q-T INTERVAL: 338 MS
QRS DURATION: 74 MS
QTC CALCULATION (BEZET): 408 MS
R AXIS: 36 DEGREES
T AXIS: 16 DEGREES
VENTRICULAR RATE: 88 BPM

## 2024-10-28 LAB
C TRACH DNA SPEC QL NAA+PROBE: NEGATIVE
HPV E6+E7 MRNA CVX QL NAA+PROBE: NEGATIVE
N GONORRHOEA DNA SPEC QL NAA+PROBE: NEGATIVE

## 2024-10-28 RX ORDER — TIRZEPATIDE 12.5 MG/.5ML
12.5 INJECTION, SOLUTION SUBCUTANEOUS WEEKLY
Qty: 2 ML | Refills: 0 | Status: SHIPPED | OUTPATIENT
Start: 2024-10-28

## 2024-11-01 NOTE — PROGRESS NOTES
CC: Annual Physical Exam    HPI:   Jennifer Donato is a 34 year old female who presents for a complete physical exam.    HCM  -Diet:  Well-balanced.   -Exercise active  -Skin care:  no concerning lesions/moles  -Menses: regular cycles. No heavy bleeding or cramping    Going thru a divorce.   Havign sleep deprivation, anxiety due to this. No SI/HI  Having some joint pain  Very tired  Has been bruising easily. Only in extremities. No easy bleeding  Eating poorly    Wt Readings from Last 6 Encounters:   10/25/24 147 lb (66.7 kg)   10/17/24 151 lb 1.6 oz (68.5 kg)   10/01/24 150 lb (68 kg)   08/26/24 150 lb 12.8 oz (68.4 kg)   04/18/24 160 lb (72.6 kg)   12/01/23 178 lb 4.8 oz (80.9 kg)     Body mass index is 27.97 kg/m².     Results for orders placed or performed in visit on 08/26/24   Urinalysis, Routine    Collection Time: 08/26/24  5:13 PM   Result Value Ref Range    Urine Color Yellow Yellow    Clarity Urine Clear Clear    Spec Gravity 1.029 1.005 - 1.030    Glucose Urine Normal Normal mg/dL    Bilirubin Urine Negative Negative    Ketones Urine Negative Negative mg/dL    Blood Urine Negative Negative    pH Urine 6.5 5.0 - 8.0    Protein Urine Trace (A) Negative mg/dL    Urobilinogen Urine 2 (A) Normal    Nitrite Urine Negative Negative    Leukocyte Esterase Urine Negative Negative    Microscopic Microscopic not indicated    Urine Culture, Routine [E]    Collection Time: 08/26/24  5:13 PM    Specimen: Urine, clean catch   Result Value Ref Range    Urine Culture No Growth at 18-24 hrs.        Current Outpatient Medications   Medication Sig Dispense Refill    cyclobenzaprine 5 MG Oral Tab Take 1 tablet (5 mg total) by mouth nightly as needed. Muscle spasm 14 tablet 0    MOUNJARO 12.5 MG/0.5ML Subcutaneous Solution Auto-injector INJECT 12.5 MG INTO THE SKIN ONCE A WEEK. 2 mL 0    spironolactone 50 MG Oral Tab TAKE ONE TABLET FOR ONE WEEK THEN INCREASE TO TWICE DAILY STAY HYDRATED WHILE ON MEDICATION       lisdexamfetamine (VYVANSE) 50 MG Oral Cap Take 1 capsule (50 mg total) by mouth daily. 30 capsule 0    [START ON 2024] lisdexamfetamine (VYVANSE) 50 MG Oral Cap Take 1 capsule (50 mg total) by mouth daily. (Patient not taking: Reported on 10/25/2024) 30 capsule 0    [START ON 2024] lisdexamfetamine (VYVANSE) 50 MG Oral Cap Take 1 capsule (50 mg total) by mouth daily. (Patient not taking: Reported on 10/25/2024) 30 capsule 0    escitalopram 5 MG Oral Tab Take 1 tablet (5 mg total) by mouth daily. (Patient not taking: Reported on 10/25/2024) 90 tablet 1    Ferrous Sulfate 325 (65 Fe) MG Oral Tab Take 1 tablet (325 mg total) by mouth in the morning and 1 tablet (325 mg total) before bedtime. (Patient not taking: Reported on 10/25/2024) 180 tablet 0      Allergies[1]   Past Medical History:    COVID-19    History of cholecystectomy    Obesity (BMI 30-39.9)      Past Surgical History:   Procedure Laterality Date                Cholecystectomy  2023    Ercp,insert stent,biliary/panc  02/15/2023    Laparoscopic cholecystectomy  2023    Leep        Family History   Problem Relation Age of Onset    Lipids Father     Diabetes Mother     Diabetes Maternal Grandmother     Hypertension Maternal Grandmother     Stroke Maternal Grandmother     Other (Other) Maternal Grandmother         kidney disease    Diabetes Other     Breast Cancer Neg     Ovarian Cancer Neg     Colon Cancer Neg     Uterine Cancer Neg       Social History:   Social History     Socioeconomic History    Marital status: Single    Number of children: 1   Tobacco Use    Smoking status: Never    Smokeless tobacco: Never   Vaping Use    Vaping status: Never Used   Substance and Sexual Activity    Alcohol use: No    Drug use: Never     Social Drivers of Health     Financial Resource Strain: Low Risk  (2023)    Financial Resource Strain     Difficulty of Paying Living Expenses: Not hard at all     Med Affordability:  No   Transportation Needs: No Transportation Needs (2/20/2023)    Transportation Needs     Lack of Transportation: No          REVIEW OF SYSTEMS:   Review of Systems   Constitutional:  Positive for fatigue. Negative for chills and fever.   Respiratory:  Negative for cough and shortness of breath.    Cardiovascular:  Negative for chest pain, palpitations and leg swelling.   Gastrointestinal:  Negative for abdominal pain, constipation, diarrhea and vomiting.   Musculoskeletal:  Positive for arthralgias.   Neurological:  Negative for headaches.   Hematological:  Bruises/bleeds easily.   Psychiatric/Behavioral:  The patient is nervous/anxious.             PHYSICAL EXAM:   /68   Pulse 94   Temp 98.1 °F (36.7 °C)   Ht 5' 1.4\" (1.56 m)   Wt 150 lb (68 kg)   LMP 09/20/2024 (Exact Date)   SpO2 98%   BMI 27.97 kg/m²  Estimated body mass index is 27.97 kg/m² as calculated from the following:    Height as of this encounter: 5' 1.4\" (1.56 m).    Weight as of this encounter: 150 lb (68 kg).     Wt Readings from Last 3 Encounters:   10/25/24 147 lb (66.7 kg)   10/17/24 151 lb 1.6 oz (68.5 kg)   10/01/24 150 lb (68 kg)       Physical Exam  Vitals reviewed.   Constitutional:       General: She is not in acute distress.     Appearance: She is well-developed.   HENT:      Head: Normocephalic.      Right Ear: Tympanic membrane and external ear normal.      Left Ear: Tympanic membrane and external ear normal.   Eyes:      Conjunctiva/sclera: Conjunctivae normal.      Pupils: Pupils are equal, round, and reactive to light.   Neck:      Thyroid: No thyromegaly.   Cardiovascular:      Rate and Rhythm: Normal rate and regular rhythm.      Heart sounds: Normal heart sounds. No murmur heard.  Pulmonary:      Effort: Pulmonary effort is normal. No respiratory distress.      Breath sounds: Normal breath sounds.   Abdominal:      General: Bowel sounds are normal. There is no distension.      Palpations: Abdomen is soft.       Tenderness: There is no abdominal tenderness.   Musculoskeletal:         General: Normal range of motion.      Cervical back: Normal range of motion and neck supple.      Right lower leg: No edema.      Left lower leg: No edema.   Skin:     Findings: No bruising or rash.   Neurological:      General: No focal deficit present.      Cranial Nerves: No cranial nerve deficit.           ASSESSMENT AND PLAN:   Jennifer Donato is a 34 year old female who presents for a complete physical exam.      Health maintenance, will check :   Orders Placed This Encounter   Procedures    CBC With Differential With Platelet    Comp Metabolic Panel (14)    Hemoglobin A1C    Lipid Panel    TSH W Reflex To Free T4    Vitamin D    Vitamin B12    Iron And Tibc [E]    Ferritin [E]    Prothrombin Time (PT) [E]    PTT, Activated [E]    Rheumatoid Arthritis Factor    Sed Rate, Westergren (Automated)    C-Reactive Protein    ADIN, Direct, with Reflex Titer + Spec AB ([place] only)       Diagnoses and all orders for this visit:    Wellness examination  -     CBC With Differential With Platelet; Future  -     Comp Metabolic Panel (14); Future  -     Hemoglobin A1C; Future  -     Lipid Panel; Future  -     TSH W Reflex To Free T4; Future  -     Pt reassured and all questions answered.  -     Age/sex specific preventive measures/immunizations reviewed and discussed with pt.  -     Pt counseled with regards to diet and exercise.    Chronic fatigue  -     TSH W Reflex To Free T4; Future  -     Vitamin D; Future  -     Vitamin B12; Future  -     Iron And Tibc [E]; Future  -     Ferritin [E]; Future  -     ADIN, Direct, with Reflex Titer + Spec AB ([place] only); Future  -Supportive care discussed including proper sleep, hydration, healthy eating, exercise, stress-relieve  -Bloodwork obtained    Polyarthralgia  -     Rheumatoid Arthritis Factor; Future  -     Sed Rate, Westergren (Automated); Future  -     C-Reactive Protein; Future  -     ADIN,  Direct, with Reflex Titer + Spec AB ([place] only); Future    Easy bruising  -     Prothrombin Time (PT) [E]; Future  -     PTT, Activated [E]; Future  -suspect jorge l K deficinency from nutrition  -supportive care PRN         Health Maintenance Due   Topic Date Due    Annual Physical  07/22/2023    COVID-19 Vaccine (3 - 2023-24 season) 09/01/2024    Influenza Vaccine (1) 10/01/2024         The patient indicates understanding of these issues and agrees to the plan.  Return if symptoms worsen or fail to improve.  Reasurrance and education provided. All questions answered. Red flags/ ER precautions discussed.      Spent 40 minutes (10 in preventative and  30 in acute/chronic)  including chart review, reviewing appropriate medical history, evaluating patient, discussing treatment options, counseling and education (diet and exercise), ordering appropriate diagnostic tests and completing documentation.             [1] No Known Allergies

## 2024-11-05 ENCOUNTER — TELEPHONE (OUTPATIENT)
Dept: SURGERY | Facility: CLINIC | Age: 34
End: 2024-11-05

## 2024-11-05 RX ORDER — TIRZEPATIDE 12.5 MG/.5ML
12.5 INJECTION, SOLUTION SUBCUTANEOUS WEEKLY
Qty: 2 ML | Refills: 0 | Status: SHIPPED | OUTPATIENT
Start: 2024-11-05

## 2024-11-20 RX ORDER — TIRZEPATIDE 12.5 MG/.5ML
12.5 INJECTION, SOLUTION SUBCUTANEOUS WEEKLY
Qty: 2 ML | Refills: 0 | Status: SHIPPED | OUTPATIENT
Start: 2024-11-20

## 2024-12-17 RX ORDER — TIRZEPATIDE 12.5 MG/.5ML
12.5 INJECTION, SOLUTION SUBCUTANEOUS WEEKLY
Qty: 2 ML | Refills: 0 | Status: SHIPPED | OUTPATIENT
Start: 2024-12-17

## 2024-12-28 DIAGNOSIS — R63.2 BINGE EATING: ICD-10-CM

## 2024-12-30 RX ORDER — LISDEXAMFETAMINE DIMESYLATE 50 MG/1
50 CAPSULE ORAL DAILY
Qty: 30 CAPSULE | Refills: 0 | Status: SHIPPED | OUTPATIENT
Start: 2024-12-30 | End: 2025-01-29

## 2025-01-15 RX ORDER — FERROUS SULFATE 325(65) MG
1 TABLET ORAL 2 TIMES DAILY
Qty: 180 TABLET | Refills: 0 | Status: SHIPPED | OUTPATIENT
Start: 2025-01-15

## 2025-01-25 DIAGNOSIS — R63.2 BINGE EATING: ICD-10-CM

## 2025-01-27 DIAGNOSIS — R63.2 BINGE EATING: ICD-10-CM

## 2025-01-27 RX ORDER — LISDEXAMFETAMINE DIMESYLATE 50 MG
50 CAPSULE ORAL DAILY
Qty: 30 CAPSULE | Refills: 0 | OUTPATIENT
Start: 2025-01-27

## 2025-01-27 RX ORDER — LISDEXAMFETAMINE DIMESYLATE 50 MG/1
50 CAPSULE ORAL DAILY
Qty: 30 CAPSULE | Refills: 0 | Status: SHIPPED | OUTPATIENT
Start: 2025-01-27 | End: 2025-02-26

## 2025-02-20 DIAGNOSIS — R63.2 BINGE EATING: ICD-10-CM

## 2025-02-20 RX ORDER — LISDEXAMFETAMINE DIMESYLATE 50 MG/1
50 CAPSULE ORAL DAILY
Qty: 30 CAPSULE | Refills: 0 | Status: SHIPPED | OUTPATIENT
Start: 2025-03-23 | End: 2025-04-22

## 2025-02-20 RX ORDER — LISDEXAMFETAMINE DIMESYLATE 50 MG/1
50 CAPSULE ORAL DAILY
Qty: 30 CAPSULE | Refills: 0 | OUTPATIENT
Start: 2025-02-20 | End: 2025-03-22

## 2025-02-20 RX ORDER — LISDEXAMFETAMINE DIMESYLATE 50 MG/1
50 CAPSULE ORAL DAILY
Qty: 30 CAPSULE | Refills: 0 | Status: SHIPPED | OUTPATIENT
Start: 2025-04-23 | End: 2025-05-23

## 2025-02-20 RX ORDER — LISDEXAMFETAMINE DIMESYLATE 50 MG/1
50 CAPSULE ORAL DAILY
Qty: 30 CAPSULE | Refills: 0 | Status: SHIPPED | OUTPATIENT
Start: 2025-02-20 | End: 2025-03-22

## 2025-02-27 DIAGNOSIS — R63.2 BINGE EATING: Primary | ICD-10-CM

## 2025-02-27 RX ORDER — LISDEXAMFETAMINE DIMESYLATE 60 MG/1
60 CAPSULE ORAL EVERY MORNING
Qty: 30 CAPSULE | Refills: 0 | Status: SHIPPED | OUTPATIENT
Start: 2025-02-27

## 2025-03-19 ENCOUNTER — NURSE TRIAGE (OUTPATIENT)
Dept: INTERNAL MEDICINE CLINIC | Facility: CLINIC | Age: 35
End: 2025-03-19

## 2025-03-19 ENCOUNTER — HOSPITAL ENCOUNTER (EMERGENCY)
Facility: HOSPITAL | Age: 35
Discharge: HOME | End: 2025-03-19
Attending: EMERGENCY MEDICINE
Payer: COMMERCIAL

## 2025-03-19 ENCOUNTER — HOSPITAL ENCOUNTER (OUTPATIENT)
Age: 35
Discharge: ANOTHER HOSPITAL | End: 2025-03-19
Payer: COMMERCIAL

## 2025-03-19 ENCOUNTER — PATIENT MESSAGE (OUTPATIENT)
Age: 35
End: 2025-03-19

## 2025-03-19 ENCOUNTER — APPOINTMENT (OUTPATIENT)
Dept: CT IMAGING | Facility: HOSPITAL | Age: 35
End: 2025-03-19
Attending: EMERGENCY MEDICINE
Payer: COMMERCIAL

## 2025-03-19 VITALS
SYSTOLIC BLOOD PRESSURE: 108 MMHG | WEIGHT: 158 LBS | HEART RATE: 77 BPM | RESPIRATION RATE: 18 BRPM | BODY MASS INDEX: 30 KG/M2 | DIASTOLIC BLOOD PRESSURE: 67 MMHG | OXYGEN SATURATION: 100 % | TEMPERATURE: 97 F

## 2025-03-19 VITALS
OXYGEN SATURATION: 100 % | HEART RATE: 90 BPM | TEMPERATURE: 97 F | RESPIRATION RATE: 18 BRPM | SYSTOLIC BLOOD PRESSURE: 99 MMHG | DIASTOLIC BLOOD PRESSURE: 69 MMHG

## 2025-03-19 DIAGNOSIS — R42 DIZZINESS: ICD-10-CM

## 2025-03-19 DIAGNOSIS — R51.9 FREQUENT HEADACHES: Primary | ICD-10-CM

## 2025-03-19 DIAGNOSIS — R42 VERTIGO: ICD-10-CM

## 2025-03-19 DIAGNOSIS — R51.9 ACUTE NONINTRACTABLE HEADACHE, UNSPECIFIED HEADACHE TYPE: Primary | ICD-10-CM

## 2025-03-19 LAB
ANION GAP SERPL CALC-SCNC: 4 MMOL/L (ref 0–18)
BASOPHILS # BLD AUTO: 0.04 X10(3) UL (ref 0–0.2)
BASOPHILS NFR BLD AUTO: 0.5 %
BUN BLD-MCNC: 14 MG/DL (ref 9–23)
BUN/CREAT SERPL: 18.7 (ref 10–20)
CALCIUM BLD-MCNC: 9.3 MG/DL (ref 8.7–10.4)
CHLORIDE SERPL-SCNC: 109 MMOL/L (ref 98–112)
CO2 SERPL-SCNC: 27 MMOL/L (ref 21–32)
CREAT BLD-MCNC: 0.75 MG/DL
DEPRECATED RDW RBC AUTO: 43.4 FL (ref 35.1–46.3)
EGFRCR SERPLBLD CKD-EPI 2021: 106 ML/MIN/1.73M2 (ref 60–?)
EOSINOPHIL # BLD AUTO: 0.19 X10(3) UL (ref 0–0.7)
EOSINOPHIL NFR BLD AUTO: 2.5 %
ERYTHROCYTE [DISTWIDTH] IN BLOOD BY AUTOMATED COUNT: 13.6 % (ref 11–15)
GLUCOSE BLD-MCNC: 76 MG/DL (ref 70–99)
HCG SERPL QL: NEGATIVE
HCT VFR BLD AUTO: 36.9 %
HGB BLD-MCNC: 12.2 G/DL
IMM GRANULOCYTES # BLD AUTO: 0.01 X10(3) UL (ref 0–1)
IMM GRANULOCYTES NFR BLD: 0.1 %
LYMPHOCYTES # BLD AUTO: 2.41 X10(3) UL (ref 1–4)
LYMPHOCYTES NFR BLD AUTO: 32.2 %
MAGNESIUM SERPL-MCNC: 2 MG/DL (ref 1.6–2.6)
MCH RBC QN AUTO: 28.6 PG (ref 26–34)
MCHC RBC AUTO-ENTMCNC: 33.1 G/DL (ref 31–37)
MCV RBC AUTO: 86.6 FL
MONOCYTES # BLD AUTO: 0.57 X10(3) UL (ref 0.1–1)
MONOCYTES NFR BLD AUTO: 7.6 %
NEUTROPHILS # BLD AUTO: 4.26 X10 (3) UL (ref 1.5–7.7)
NEUTROPHILS # BLD AUTO: 4.26 X10(3) UL (ref 1.5–7.7)
NEUTROPHILS NFR BLD AUTO: 57.1 %
OSMOLALITY SERPL CALC.SUM OF ELEC: 289 MOSM/KG (ref 275–295)
PLATELET # BLD AUTO: 247 10(3)UL (ref 150–450)
POTASSIUM SERPL-SCNC: 4.3 MMOL/L (ref 3.5–5.1)
RBC # BLD AUTO: 4.26 X10(6)UL
SODIUM SERPL-SCNC: 140 MMOL/L (ref 136–145)
TSI SER-ACNC: 1.82 UIU/ML (ref 0.55–4.78)
WBC # BLD AUTO: 7.5 X10(3) UL (ref 4–11)

## 2025-03-19 PROCEDURE — 84443 ASSAY THYROID STIM HORMONE: CPT | Performed by: EMERGENCY MEDICINE

## 2025-03-19 PROCEDURE — 96375 TX/PRO/DX INJ NEW DRUG ADDON: CPT

## 2025-03-19 PROCEDURE — 96374 THER/PROPH/DIAG INJ IV PUSH: CPT

## 2025-03-19 PROCEDURE — 83735 ASSAY OF MAGNESIUM: CPT | Performed by: EMERGENCY MEDICINE

## 2025-03-19 PROCEDURE — 93010 ELECTROCARDIOGRAM REPORT: CPT

## 2025-03-19 PROCEDURE — 99284 EMERGENCY DEPT VISIT MOD MDM: CPT

## 2025-03-19 PROCEDURE — 96361 HYDRATE IV INFUSION ADD-ON: CPT

## 2025-03-19 PROCEDURE — 93005 ELECTROCARDIOGRAM TRACING: CPT

## 2025-03-19 PROCEDURE — 84703 CHORIONIC GONADOTROPIN ASSAY: CPT | Performed by: EMERGENCY MEDICINE

## 2025-03-19 PROCEDURE — 70498 CT ANGIOGRAPHY NECK: CPT | Performed by: EMERGENCY MEDICINE

## 2025-03-19 PROCEDURE — 70496 CT ANGIOGRAPHY HEAD: CPT | Performed by: EMERGENCY MEDICINE

## 2025-03-19 PROCEDURE — 85025 COMPLETE CBC W/AUTO DIFF WBC: CPT | Performed by: EMERGENCY MEDICINE

## 2025-03-19 PROCEDURE — 99215 OFFICE O/P EST HI 40 MIN: CPT | Performed by: NURSE PRACTITIONER

## 2025-03-19 PROCEDURE — 80048 BASIC METABOLIC PNL TOTAL CA: CPT | Performed by: EMERGENCY MEDICINE

## 2025-03-19 RX ORDER — KETOROLAC TROMETHAMINE 30 MG/ML
30 INJECTION, SOLUTION INTRAMUSCULAR; INTRAVENOUS ONCE
Status: COMPLETED | OUTPATIENT
Start: 2025-03-19 | End: 2025-03-19

## 2025-03-19 RX ORDER — SUMATRIPTAN 50 MG/1
50 TABLET, FILM COATED ORAL EVERY 2 HOUR PRN
Qty: 6 TABLET | Refills: 0 | Status: SHIPPED | OUTPATIENT
Start: 2025-03-19 | End: 2025-04-18

## 2025-03-19 RX ORDER — DIPHENHYDRAMINE HYDROCHLORIDE 50 MG/ML
25 INJECTION, SOLUTION INTRAMUSCULAR; INTRAVENOUS ONCE
Status: COMPLETED | OUTPATIENT
Start: 2025-03-19 | End: 2025-03-19

## 2025-03-19 RX ORDER — ONDANSETRON 4 MG/1
4 TABLET, ORALLY DISINTEGRATING ORAL
Qty: 10 TABLET | Refills: 0 | Status: SHIPPED | OUTPATIENT
Start: 2025-03-19 | End: 2025-03-26

## 2025-03-19 RX ORDER — MECLIZINE HYDROCHLORIDE 25 MG/1
25 TABLET ORAL 3 TIMES DAILY PRN
Qty: 21 TABLET | Refills: 0 | Status: SHIPPED | OUTPATIENT
Start: 2025-03-19

## 2025-03-19 NOTE — ED PROVIDER NOTES
Chief Complaint   Patient presents with    Dizziness       HPI:     Jennifer Donato is a 35 year old female presents with a chief complaint of headache.  Rates it 8/10 currently. Headache started about 3 to 4 weeks ago, around the same time she sustained a posterior head injury at a kids jump house, coming down a slide.  She reports within the last 2 to 3 days, headache has been worse.  Has not been taking any medication for the headache.  However within the last 2 days has had what she describes as vertigo, room spinning sensation, that is primarily positional.  She works at the [employer], and one of the providers prescribed her meclizine, which she has been taking which helps some.  However she is mostly concerned about the worsening headache, and the brain fog she is experiencing.  She is not on any blood thinning medications.  At the time of injury she did not lose consciousness.  She has had no chest pain or shortness of breath however she does report within the last couple days she has noticed that her heart rate sometimes increases to the 140s.  She is unsure if this is due to the energy drink, that she has been drinking.  She has had no weakness, numbness, tingling.  No nausea, vomiting, diarrhea.  LMP February 24, 2025.      PFSH  PFS asessment screens reviewed and agree.  Nursing note reviewed and I agree with documentation.    Social History     Socioeconomic History    Marital status: Single     Spouse name: Not on file    Number of children: 1    Years of education: Not on file    Highest education level: Not on file   Occupational History    Not on file   Tobacco Use    Smoking status: Never    Smokeless tobacco: Never   Vaping Use    Vaping status: Never Used   Substance and Sexual Activity    Alcohol use: No    Drug use: Never    Sexual activity: Not on file   Other Topics Concern    Caffeine Concern Not Asked    Exercise Not Asked    Seat Belt Not Asked    Special Diet Not Asked     Stress Concern Not Asked    Weight Concern Not Asked   Social History Narrative    Not on file     Social Drivers of Health     Food Insecurity: Not on file   Transportation Needs: No Transportation Needs (2/20/2023)    Transportation Needs     Lack of Transportation: No   Housing Stability: Not on file     Family History   Problem Relation Age of Onset    Lipids Father     Diabetes Mother     Diabetes Maternal Grandmother     Hypertension Maternal Grandmother     Stroke Maternal Grandmother     Other (Other) Maternal Grandmother         kidney disease    Diabetes Other     Breast Cancer Neg     Ovarian Cancer Neg     Colon Cancer Neg     Uterine Cancer Neg      Family history reviewed with patient/caregiver and is not pertinent to presenting problem.      ROS:     Positive for stated complaint: headache, dizziness   All other systems reviewed and negative except as noted above.  Constitutional and Vital Signs Reviewed.    Physical Exam:     BP 99/69   Pulse 90   Temp 97.4 °F (36.3 °C) (Oral)   Resp 18   LMP 02/24/2025 (Exact Date)   SpO2 100%   GENERAL: well developed, well nourished, well hydrated, no distress  EYES: sclera non icteric bilateral, KHADAR, EOMI  HEENT: atraumatic, normocephalic, ears, nose and throat are clear  NECK: supple, no adenopathy  CARDIO: RRR without murmur  LUNGS: clear to auscultation, no RRW  NEURO: no focal deficits. Normal finger to nose. Normal gait.  EXTREMITIES: no cyanosis or edema, normal ROM  SKIN: good skin turgor, no obvious rashes    MDM/Assessment/Plan:   Orders for this encounter:    No orders of the defined types were placed in this encounter.      Labs performed this visit:  No results found for this or any previous visit (from the past 10 hours).    MDM:  Medical Decision Making  Differentials considered: Vertigo versus acute intracranial process versus space-occupying lesion versus other    Advised patient be seen in the ER for further evaluation given symptoms, and  recent head injury.  Patient agreeable, will go to [facility].    Case discussed with Dr. Violeta Aquino           Diagnosis:    ICD-10-CM    1. Acute nonintractable headache, unspecified headache type  R51.9       2. Dizziness  R42           All results reviewed and discussed with patient.  See AVS for detailed discharge instructions for your condition today.    Please follow up with:  No follow-up provider specified.

## 2025-03-19 NOTE — TELEPHONE ENCOUNTER
Spoke with Jennifer states has vertigo for 3 days. She reported on 3/16/25 she had severe vertigo where she could not stand unassisted and vomited x 2. She states the following days has moderate vertigo where she can stand and ambulate unassisted. Patient also has intermittent head pain occipital area that resolves with Tylenol. Patient denied nasal or ear congestion. 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 Patient denied chest pain or shortness of breath.     Disposition: Go to      Patient agreed to  disposition. RN informed Jennifer if have worsening symptoms of vertigo, sustained tachycardia, or chest pain go to the emergency department. Patient voiced understanding.    Reason for Disposition   SEVERE headache or neck pain    Answer Assessment - Initial Assessment Questions  1. DESCRIPTION: \"Describe your dizziness.\"      Dennied  2. LIGHTHEADED: \"Do you feel lightheaded?\" (e.g., somewhat faint, woozy, weak upon standing)      Denied  3. VERTIGO: \"Do you feel like either you or the room is spinning or tilting?\" (i.e. vertigo)      Yes, patient taking meclizine.   4. SEVERITY: \"How bad is it?\"  \"Do you feel like you are going to faint?\" \"Can you stand and walk?\"    - MILD: Feels slightly dizzy, but walking normally.    - MODERATE: Feels unsteady when walking, but not falling; interferes with normal activities (e.g., school, work).    - SEVERE: Unable to walk without falling, or requires assistance to walk without falling; feels like passing out now.       Moderate   5. ONSET:  \"When did the dizziness begin?\"      3 days  6. AGGRAVATING FACTORS: \"Does anything make it worse?\" (e.g., standing, change in head position)      Position change especially getting out of bed.  7. HEART RATE: \"Can you tell me your heart rate?\" \"How many beats in 15 seconds?\"  (Note: not all patients can do this)        146-152  8. CAUSE: \"What do you think is causing the dizziness?\"       Unknown   9. RECURRENT SYMPTOM: \"Have you had dizziness before?\" If Yes, ask: \"When was the last time?\" \"What happened that time?\"      Denied  10. OTHER SYMPTOMS: \"Do you have any other symptoms?\" (e.g., fever, chest pain, vomiting, diarrhea, bleeding)        Vomited 3 days ago but not since then.   11. PREGNANCY: \"Is there any chance you are pregnant?\" \"When was your last menstrual period?\"        Denied Patient is 7 days late.    Protocols used: Dizziness-A-OH

## 2025-03-20 LAB
ATRIAL RATE: 82 BPM
P AXIS: 60 DEGREES
P-R INTERVAL: 142 MS
Q-T INTERVAL: 346 MS
QRS DURATION: 76 MS
QTC CALCULATION (BEZET): 404 MS
R AXIS: 56 DEGREES
T AXIS: 39 DEGREES
VENTRICULAR RATE: 82 BPM

## 2025-03-20 NOTE — ED PROVIDER NOTES
[facility] Department Note  Patient: Jennifer Donato Age: 35 year old Sex: female      MRN: [id number]  : 1990    Patient Seen in: [facility] Emergency Department    History     Chief Complaint   Patient presents with    Dizziness    Headache     Stated Complaint: Headache, dizziness    History obtained from: patient     35-year-old female history of self diagnosed vertigo, presents to the ER with 3 weeks of left-sided headache, increased vertigo symptoms.  Patient states that she had hit her head about 4 weeks ago and over the past 3 weeks has been noticing constant left-sided headache.  States she has been having more frequent episodes of room spinning dizziness.  This occurs when she turns her head and improves when she sits still, no constant room spinning or difficulty ambulating/feeling off balance otherwise.  Reports nausea and vomiting today, nonbloody nonbilious.  No fever.  No cough or congestion.  No chest pain or shortness of breath.  No abdominal pain or diarrhea.  She denies numbness weakness or tingling her extremities, vision changes, diplopia, slurred speech.  No family history of brain aneurysm or sudden cardiac death.  Seen in urgent care and sent to the ER for further management today.  Been taking meclizine without relief.    Review of Systems:  Review of Systems  Positive for stated complaint: Headache, dizziness. Constitutional and vital signs reviewed. All other systems reviewed and negative except as noted above.    Patient History:  Past Medical History:    COVID-19    History of cholecystectomy    Obesity (BMI 30-39.9)       Past Surgical History:   Procedure Laterality Date                Cholecystectomy  2023    Ercp,insert stent,biliary/panc  02/15/2023    Laparoscopic cholecystectomy  2023    Leep          Family History   Problem Relation Age of Onset    Lipids Father     Diabetes Mother     Diabetes Maternal  Grandmother     Hypertension Maternal Grandmother     Stroke Maternal Grandmother     Other (Other) Maternal Grandmother         kidney disease    Diabetes Other     Breast Cancer Neg     Ovarian Cancer Neg     Colon Cancer Neg     Uterine Cancer Neg        Specific Social Determinants of Health:   Social History     Socioeconomic History    Marital status: Single    Number of children: 1   Tobacco Use    Smoking status: Never    Smokeless tobacco: Never   Vaping Use    Vaping status: Never Used   Substance and Sexual Activity    Alcohol use: No    Drug use: Never     Social Drivers of Health     Transportation Needs: No Transportation Needs (2/20/2023)    Transportation Needs     Lack of Transportation: No           PSFH elements reviewed from today and agreed except as otherwise stated in HPI.    Physical Exam     ED Triage Vitals [03/19/25 1930]   /65   Pulse 88   Resp 18   Temp 97.4 °F (36.3 °C)   Temp src Temporal   SpO2 99 %   O2 Device None (Room air)       Current:/67   Pulse 77   Temp 97.4 °F (36.3 °C) (Temporal)   Resp 18   Wt 71.7 kg   LMP 02/24/2025 (Exact Date)   SpO2 100%   BMI 29.85 kg/m²         Physical Exam  Vitals and nursing note reviewed.   Constitutional:       General: She is not in acute distress.     Appearance: She is not ill-appearing.   HENT:      Head: Normocephalic and atraumatic.      Right Ear: Tympanic membrane, ear canal and external ear normal.      Left Ear: Tympanic membrane, ear canal and external ear normal.      Nose: Nose normal.      Mouth/Throat:      Mouth: Mucous membranes are moist.      Pharynx: Oropharynx is clear. No oropharyngeal exudate or posterior oropharyngeal erythema.   Eyes:      Extraocular Movements: Extraocular movements intact.      Conjunctiva/sclera: Conjunctivae normal.      Pupils: Pupils are equal, round, and reactive to light.   Neck:      Comments: Diffuse bilateral paraspinal mm tenderness. No stepoff nor deformity    Cardiovascular:      Rate and Rhythm: Normal rate and regular rhythm.      Heart sounds: No murmur heard.     Comments: 2+ radial and dp pulses bilaterally   Pulmonary:      Effort: Pulmonary effort is normal. No respiratory distress.      Breath sounds: No wheezing or rales.   Abdominal:      General: There is no distension.      Palpations: Abdomen is soft.      Tenderness: There is no abdominal tenderness. There is no guarding or rebound.   Musculoskeletal:         General: No deformity.      Cervical back: Normal range of motion and neck supple. No rigidity.      Right lower leg: No edema.      Left lower leg: No edema.   Lymphadenopathy:      Cervical: No cervical adenopathy.   Skin:     General: Skin is warm and dry.      Capillary Refill: Capillary refill takes less than 2 seconds.   Neurological:      General: No focal deficit present.      Mental Status: She is alert and oriented to person, place, and time.      GCS: GCS eye subscore is 4. GCS verbal subscore is 5. GCS motor subscore is 6.      Cranial Nerves: No cranial nerve deficit, dysarthria or facial asymmetry.      Coordination: Heel to Shin Test normal.      Comments: Strength is 5/5 bilateral upper extremities on handgrip, elbow flexion/extension, bilateral lower extremities on hip flexion and ankle plantar/dorsiflexion. SILT bilat UE and LE throughout and symmetric. Finger to nose intact bilaterally.      Psychiatric:         Mood and Affect: Mood normal.         ED Course   Labs:   Labs Reviewed   BASIC METABOLIC PANEL (8) - Normal   HCG, BETA SUBUNIT, QUAL - Normal   MAGNESIUM - Normal   TSH W REFLEX TO FREE T4 - Normal   CBC WITH DIFFERENTIAL WITH PLATELET   RAINBOW DRAW LAVENDER   RAINBOW DRAW LIGHT GREEN   RAINBOW DRAW BLUE   RAINBOW DRAW GOLD     Radiology findings:    CTA BRAIN + CTA CAROTIDS (CPT=70496/16501)    Result Date: 3/19/2025  CONCLUSION:  1. Focal linear filling defects in vertebral arteries at skull base (V4 segments) are  probably artifactual related to patient motion.  Highly unlikely these are focal dissections.  Both vertebral arteries are otherwise normal. 2. Slight image degradation by patient related motion artifact. 3. Beading of intracranial arteries on MIP reformatted sequences is likely artifactual related to patient motion.  Less likely intracranial FMD or vasculitis.  If patient has persistent unexplained symptoms, follow-up MRA or CTA for confirmation. 4.  No hemodynamically significant stenosis, AVM or aneurysm.  5. No acute intracranial finding. 6. Mildly prominent thymic tissue in the anterior mediastinum.  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  Follow-up MRI or CT in 1 year recommended to confirm.    Dictated by (CST): Chato Guadarrama MD on 3/19/2025 at 9:55 PM     Finalized by (CST): Chato Guadarrama MD on 3/19/2025 at 10:10 PM           EKG as interpreted by me: My interpretation of EKG normal sinus rhythm rate 82 beats minute, normal axis, normal intervals, no STEMI, no ectopy, QTc 404 ms  Cardiac Monitor: Interpreted by me.   Pulse Readings from Last 1 Encounters:   03/19/25 77   , sinus,       MDM   This patient presents with 3 weeks of headache, increased vertigo from baseline though no vertigo at rest, she has a nonfocal neurologic exam and appears well-perfused.  Her EKG is unremarkable    Differential diagnoses considered includes, but is not limited to:   Migraine or tension headache  Posttraumatic headache or concussion  Less likely subdural hemorrhage, clinically very low suspicion for subarachnoid hemorrhage given the chronicity of headache and no thunderclap/maximal onset, less likely but considered vascular dissection leading to her dizziness, clinically low suspicion for posterior circulation stroke  Electrolyte abnormality  BPPV  Anemia  Meniere disease     Will obtain the following tests: cbc, bmp, hcg, mg, tsh, cta head/neck. Ecg from triage unremarkable   Will administer the following  medications/therapies: IV NS bolus, benadryl, toradol    Please see ED course for my independent review of these tests/imaging results.    Chronic conditions affecting care: n/a     Workup and medications considered but not ordered: n/a     Social Determinants of Health that impacted care: n/a     ED Course as of 03/19/25 2329  ------------------------------------------------------------  Time: 03/19 2043  Comment: Cbc, bmp, tsh, mg normal. Hcg neg   ------------------------------------------------------------  Time: 03/19 2212  Value: CTA BRAIN + CTA CAROTIDS (CPT=70496/87369)  Comment:      Impression  CONCLUSION:  1. Focal linear filling defects in vertebral arteries at skull base (V4 segments) are probably artifactual related to patient motion.  Highly unlikely these are focal dissections.  Both vertebral arteries are otherwise normal.  2. Slight image degradation by patient related motion artifact.  3. Beading of intracranial arteries on MIP reformatted sequences is likely artifactual related to patient motion.  Less likely intracranial FMD or vasculitis.  If patient has persistent unexplained symptoms, follow-up MRA or CTA for confirmation.  4.  No hemodynamically significant stenosis, AVM or aneurysm.    5. No acute intracranial finding.  6. Mildly prominent thymic tissue in the anterior mediastinum.  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  Follow-up MRI or CT in 1 year recommended to confirm.    ------------------------------------------------------------  Time: 03/19 2214  Comment: Discussed imaging results with the patient. She's feeling significantly improved. Discussed that there is some motion artifact degrading imaging but otherwise no acute findings, no aneurysm, no bleeding.  Advise follow-up with neurology given her continued symptoms for further management as she may need other imaging in the future.  Discussed incidental finding of residual thymic tissue.  Advised supportive cares, can  use Dramamine if meclizine not working, sumatriptan, Motrin and Tylenol as needed.  She verbalized understanding comfortable with the discharge plan.            Procedures:  Procedures      Disposition and Plan     Clinical Impression:  1. Frequent headaches    2. Vertigo        Disposition:  Discharge    Follow-up:  Aleksandra Pacheco MD  [address]  [address]  [place] [id number] [phone]    Schedule an appointment as soon as possible for a visit in 2 day(s)      Chula Espinoza MD  [address] [facility]  [address]  [place] [address]  [phone]    Schedule an appointment as soon as possible for a visit in 1 week(s)      [facility] Emergency Department  [address] [place] [address]  [phone]  Go to  If symptoms worsen, right away      Medications Prescribed:  Discharge Medication List as of 3/19/2025 10:22 PM        START taking these medications    Details   SUMAtriptan 50 MG Oral Tab Take 1 tablet (50 mg total) by mouth every 2 (two) hours as needed for Migraine. No more than 4 tablets/24 hours., Normal, Disp-6 tablet, R-0      meclizine (DRAMAMINE) 25 MG Oral Tab Take 1 tablet (25 mg total) by mouth 3 (three) times daily as needed for Dizziness., Normal, Disp-21 tablet, R-0               This note may have been created using voice dictation technology and may include inadvertent errors.      Selina Rolon,   Attending Physician   Emergency Medicine

## 2025-03-20 NOTE — ED INITIAL ASSESSMENT (HPI)
Pt arrives ambulatory to ED for c/o dizziness x 3 days and migraines x multiple weeks. +NV. Pt states she fell a month ago and hit her head, denies LOC, but has pain to her tailbone. Aox4, speaking in full sentences.

## 2025-03-20 NOTE — DISCHARGE INSTRUCTIONS
Thank you for seeking care at [facility] Emergency Department.  You have been seen and evaluated for a headache.  The CT scan today did not show any aneurysms or other acute findings in the brain.  It did incidentally show slight thickening of thymus tissue which typically resolves when we are babies.  Please follow-up with with your doctor regarding this incidental finding.   We reviewed the results from your visit in the emergency department.    Please read the instructions provided   If provided, take prescriptions as instructed.     Remember, your care process does not end after your visit today. Please follow-up with your doctor within 1-2 days for a follow-up check to ensure you are  improving, to see if you need any further evaluation/testing, or to evaluate for any alternate diagnoses.     Please return to the emergency department if you develop worsening of your headache or a new headache which is severe, associated with vision changes, difficulty with walking or coordination, difficulty with speech, numbness, tingling or weakness, associated with neck stiffness or fever, nausea or vomiting, if the headache is different from any other headache that you have had before, confusion, or if you develop any other new or concerning symptoms as these could be signs of more serious medical illness.    We hope you feel better.

## 2025-04-04 ENCOUNTER — OFFICE VISIT (OUTPATIENT)
Age: 35
End: 2025-04-04
Payer: COMMERCIAL

## 2025-04-04 VITALS
BODY MASS INDEX: 29.38 KG/M2 | OXYGEN SATURATION: 97 % | DIASTOLIC BLOOD PRESSURE: 70 MMHG | HEART RATE: 102 BPM | TEMPERATURE: 97 F | HEIGHT: 61 IN | SYSTOLIC BLOOD PRESSURE: 100 MMHG | WEIGHT: 155.63 LBS

## 2025-04-04 DIAGNOSIS — G43.809 VESTIBULAR MIGRAINE: Primary | ICD-10-CM

## 2025-04-04 PROCEDURE — 3008F BODY MASS INDEX DOCD: CPT | Performed by: FAMILY MEDICINE

## 2025-04-04 PROCEDURE — 99214 OFFICE O/P EST MOD 30 MIN: CPT | Performed by: FAMILY MEDICINE

## 2025-04-04 PROCEDURE — 3078F DIAST BP <80 MM HG: CPT | Performed by: FAMILY MEDICINE

## 2025-04-04 PROCEDURE — 3074F SYST BP LT 130 MM HG: CPT | Performed by: FAMILY MEDICINE

## 2025-04-04 RX ORDER — ONDANSETRON 4 MG/1
4 TABLET, FILM COATED ORAL EVERY 12 HOURS PRN
Qty: 20 TABLET | Refills: 0 | Status: SHIPPED | OUTPATIENT
Start: 2025-04-04

## 2025-04-04 RX ORDER — VENLAFAXINE HYDROCHLORIDE 37.5 MG/1
37.5 CAPSULE, EXTENDED RELEASE ORAL DAILY
Qty: 90 CAPSULE | Refills: 0 | Status: SHIPPED | OUTPATIENT
Start: 2025-04-04

## 2025-04-04 NOTE — PROGRESS NOTES
HPI:     Chief Complaint   Patient presents with    Test Results     CT Angio results and accidental findings       Jennifer Donato is a 35 year old female presenting for:    Left sided headaches  -for past few months getting them every morning with some vertigo  -sometimes nausea/vomiting, noise sensitivy  -no vision changes  --no awakening at night or worse HA in life  -for >1yr, now daily  -no new meds  -was at ER on 3/19  with CT showing some artifact and thymic hyperplasia.    Results for orders placed or performed during the hospital encounter of 03/19/25   EKG 12 Lead    Collection Time: 03/19/25  7:34 PM   Result Value Ref Range    Ventricular rate 82 BPM    Atrial rate 82 BPM    P-R Interval 142 ms    QRS Duration 76 ms    Q-T Interval 346 ms    QTC Calculation (Bezet) 404 ms    P Axis 60 degrees    R Axis 56 degrees    T Axis 39 degrees   Basic Metabolic Panel (8)    Collection Time: 03/19/25  7:54 PM   Result Value Ref Range    Glucose 76 70 - 99 mg/dL    Sodium 140 136 - 145 mmol/L    Potassium 4.3 3.5 - 5.1 mmol/L    Chloride 109 98 - 112 mmol/L    CO2 27.0 21.0 - 32.0 mmol/L    Anion Gap 4 0 - 18 mmol/L    BUN 14 9 - 23 mg/dL    Creatinine 0.75 0.55 - 1.02 mg/dL    BUN/CREA Ratio 18.7 10.0 - 20.0    Calcium, Total 9.3 8.7 - 10.4 mg/dL    Calculated Osmolality 289 275 - 295 mOsm/kg    eGFR-Cr 106 >=60 mL/min/1.73m2   CBC With Differential With Platelet    Collection Time: 03/19/25  7:54 PM   Result Value Ref Range    WBC 7.5 4.0 - 11.0 x10(3) uL    RBC 4.26 3.80 - 5.30 x10(6)uL    HGB 12.2 12.0 - 16.0 g/dL    HCT 36.9 35.0 - 48.0 %    MCV 86.6 80.0 - 100.0 fL    MCH 28.6 26.0 - 34.0 pg    MCHC 33.1 31.0 - 37.0 g/dL    RDW-SD 43.4 35.1 - 46.3 fL    RDW 13.6 11.0 - 15.0 %    .0 150.0 - 450.0 10(3)uL    Neutrophil Absolute Prelim 4.26 1.50 - 7.70 x10 (3) uL    Neutrophil Absolute 4.26 1.50 - 7.70 x10(3) uL    Lymphocyte Absolute 2.41 1.00 - 4.00 x10(3) uL    Monocyte Absolute 0.57 0.10 -  1.00 x10(3) uL    Eosinophil Absolute 0.19 0.00 - 0.70 x10(3) uL    Basophil Absolute 0.04 0.00 - 0.20 x10(3) uL    Immature Granulocyte Absolute 0.01 0.00 - 1.00 x10(3) uL    Neutrophil % 57.1 %    Lymphocyte % 32.2 %    Monocyte % 7.6 %    Eosinophil % 2.5 %    Basophil % 0.5 %    Immature Granulocyte % 0.1 %   HCG, Beta Subunit, Qual    Collection Time: 03/19/25  7:54 PM   Result Value Ref Range    HCG, Serum Qualitative (S) Negative Negative   Magnesium    Collection Time: 03/19/25  7:54 PM   Result Value Ref Range    Magnesium 2.0 1.6 - 2.6 mg/dL   TSH W Reflex To Free T4    Collection Time: 03/19/25  7:54 PM   Result Value Ref Range    TSH 1.818 0.550 - 4.780 uIU/mL   RAINBOW DRAW LAVENDER    Collection Time: 03/19/25  7:54 PM   Result Value Ref Range    Hold Lavender Auto Resulted    RAINBOW DRAW LIGHT GREEN    Collection Time: 03/19/25  7:54 PM   Result Value Ref Range    Hold Lt Green Auto Resulted    RAINBOW DRAW BLUE    Collection Time: 03/19/25  7:54 PM   Result Value Ref Range    Hold Blue Auto Resulted    RAINBOW DRAW GOLD    Collection Time: 03/19/25  7:54 PM   Result Value Ref Range    Hold Gold Auto Resulted        Labs:   Lab Results   Component Value Date/Time    A1C 5.2 10/05/2024 06:51 AM      Lab Results   Component Value Date/Time    CHOLEST 148 10/05/2024 06:51 AM    HDL 51 10/05/2024 06:51 AM    TRIG 102 10/05/2024 06:51 AM    LDL 78 10/05/2024 06:51 AM    NONHDLC 97 10/05/2024 06:51 AM       Lab Results   Component Value Date/Time    GLU 76 03/19/2025 07:54 PM     03/19/2025 07:54 PM    K 4.3 03/19/2025 07:54 PM     03/19/2025 07:54 PM    CO2 27.0 03/19/2025 07:54 PM    CREATSERUM 0.75 03/19/2025 07:54 PM    CA 9.3 03/19/2025 07:54 PM    ALB 4.5 10/05/2024 06:51 AM    TP 7.3 10/05/2024 06:51 AM    ALKPHO 60 10/05/2024 06:51 AM    AST 15 10/05/2024 06:51 AM    ALT 10 10/05/2024 06:51 AM    BILT 0.5 10/05/2024 06:51 AM    TSH 1.818 03/19/2025 07:54 PM           Medications:  Current Outpatient Medications   Medication Sig Dispense Refill    ondansetron (ZOFRAN) 4 mg tablet Take 1 tablet (4 mg total) by mouth every 12 (twelve) hours as needed for Nausea. 20 tablet 0    venlafaxine ER 37.5 MG Oral Capsule SR 24 Hr Take 1 capsule (37.5 mg total) by mouth daily. 90 capsule 0    Tirzepatide (MOUNJARO) 15 MG/0.5ML Subcutaneous Solution Auto-injector Inject 15 mg into the skin once a week. 2 mL 2    lisdexamfetamine (VYVANSE) 50 MG Oral Cap Take 1 capsule (50 mg total) by mouth daily. 30 capsule 0    [START ON 2025] lisdexamfetamine (VYVANSE) 50 MG Oral Cap Take 1 capsule (50 mg total) by mouth daily. 30 capsule 0    cyclobenzaprine 5 MG Oral Tab Take 1 tablet (5 mg total) by mouth nightly as needed. Muscle spasm 14 tablet 0    meclizine (DRAMAMINE) 25 MG Oral Tab Take 1 tablet (25 mg total) by mouth 3 (three) times daily as needed for Dizziness. 21 tablet 0    FERROUS SULFATE 325 (65 Fe) MG Oral Tab TAKE 1 TABLET (325 MG TOTAL) BY MOUTH IN THE MORNING AND BEFORE BEDTIME (Patient not taking: Reported on 3/19/2025) 180 tablet 0    spironolactone 50 MG Oral Tab TAKE ONE TABLET FOR ONE WEEK THEN INCREASE TO TWICE DAILY STAY HYDRATED WHILE ON MEDICATION        Past Medical History:    COVID-19    History of cholecystectomy    Obesity (BMI 30-39.9)         Past Surgical History:   Procedure Laterality Date                Cholecystectomy  2023    Ercp,insert stent,biliary/panc  02/15/2023    Laparoscopic cholecystectomy  2023    Leep       Allergies[1]   Social History:  Social History     Socioeconomic History    Marital status: Single    Number of children: 1   Tobacco Use    Smoking status: Never    Smokeless tobacco: Never   Vaping Use    Vaping status: Never Used   Substance and Sexual Activity    Alcohol use: No    Drug use: Never     Social Drivers of Health     Transportation Needs: No Transportation Needs (2023)    Transportation  Needs     Lack of Transportation: No      Family History:  Family History   Problem Relation Age of Onset    Lipids Father     Diabetes Mother     Diabetes Maternal Grandmother     Hypertension Maternal Grandmother     Stroke Maternal Grandmother     Other (Other) Maternal Grandmother         kidney disease    Diabetes Other     Breast Cancer Neg     Ovarian Cancer Neg     Colon Cancer Neg     Uterine Cancer Neg           REVIEW OF SYSTEMS:   Review of Systems   Constitutional:  Negative for fatigue and fever.   Respiratory:  Negative for cough and shortness of breath.    Cardiovascular:  Negative for chest pain, palpitations and leg swelling.   Gastrointestinal:  Negative for abdominal pain, constipation, diarrhea and vomiting.   Musculoskeletal:  Negative for arthralgias.   Neurological:  Positive for headaches.            PHYSICAL EXAM:   /70   Pulse 102   Temp 97.4 °F (36.3 °C)   Ht 5' 1\" (1.549 m)   Wt 155 lb 9.6 oz (70.6 kg)   LMP 02/24/2025 (Exact Date)   SpO2 97%   BMI 29.40 kg/m²  Estimated body mass index is 29.4 kg/m² as calculated from the following:    Height as of this encounter: 5' 1\" (1.549 m).    Weight as of this encounter: 155 lb 9.6 oz (70.6 kg).     Wt Readings from Last 3 Encounters:   05/01/25 151 lb (68.5 kg)   04/04/25 155 lb 9.6 oz (70.6 kg)   03/19/25 158 lb (71.7 kg)       Physical Exam  Vitals reviewed.   Constitutional:       General: She is not in acute distress.     Appearance: She is well-developed.   HENT:      Head: Normocephalic.      Right Ear: Tympanic membrane and external ear normal.      Left Ear: Tympanic membrane and external ear normal.   Eyes:      Extraocular Movements: Extraocular movements intact.      Conjunctiva/sclera: Conjunctivae normal.      Pupils: Pupils are equal, round, and reactive to light.   Neck:      Thyroid: No thyromegaly.   Cardiovascular:      Rate and Rhythm: Normal rate and regular rhythm.      Heart sounds: Normal heart sounds. No  murmur heard.  Pulmonary:      Effort: Pulmonary effort is normal. No respiratory distress.      Breath sounds: Normal breath sounds.   Abdominal:      General: Bowel sounds are normal. There is no distension.      Palpations: Abdomen is soft.      Tenderness: There is no abdominal tenderness.   Musculoskeletal:         General: Normal range of motion.      Cervical back: Normal range of motion and neck supple.      Right lower leg: No edema.      Left lower leg: No edema.   Skin:     Findings: No rash.   Neurological:      General: No focal deficit present.      Cranial Nerves: No cranial nerve deficit.      Sensory: No sensory deficit.      Motor: No weakness.      Coordination: Coordination normal.      Gait: Gait normal.             ASSESSMENT AND PLAN:   Patient is a 35 year old female who presents primarily presents for:    Diagnoses and all orders for this visit:    Vestibular migraine  -     ondansetron (ZOFRAN) 4 mg tablet; Take 1 tablet (4 mg total) by mouth every 12 (twelve) hours as needed for Nausea.  -     MRA BRAIN (W+WO) (CPT=70546); Future  -     venlafaxine ER 37.5 MG Oral Capsule SR 24 Hr; Take 1 capsule (37.5 mg total) by mouth daily.     -trial of effexor. SE discussed  -continue sumatriptan PRN  ER work-up reviewed.  -MRI ordered  -pending results will send to neuro      Return if symptoms worsen or fail to improve.  Patient indicates understanding of the above recommendations and agrees to the above plan.  Reasurrance and education provided. All questions answered.  Notified to call with any questions, complications, allergies, or worsening or changing symptoms as well as any side effects or complications from the treatments .  Red flags/ ER precautions discussed.    Spent 30 minutes including chart review, reviewing appropriate medical history, evaluating patient, discussing treatment options, counseling and education (diet and exercise), ordering appropriate diagnostic tests and completing  documentation.        Meds & Refills for this Visit:  Requested Prescriptions     Signed Prescriptions Disp Refills    ondansetron (ZOFRAN) 4 mg tablet 20 tablet 0     Sig: Take 1 tablet (4 mg total) by mouth every 12 (twelve) hours as needed for Nausea.    venlafaxine ER 37.5 MG Oral Capsule SR 24 Hr 90 capsule 0     Sig: Take 1 capsule (37.5 mg total) by mouth daily.       No orders of the defined types were placed in this encounter.      Imaging & Consults:  MRA BRAIN (W+WO) (CPT=70546)    Health Maintenance:  Health Maintenance   Topic Date Due    COVID-19 Vaccine (3 - 2024-25 season) 09/01/2024    Annual Physical  10/01/2025    Influenza Vaccine (Season Ended) 10/01/2025    Pap Smear  10/25/2027    DTaP,Tdap,and Td Vaccines (4 - Td or Tdap) 10/05/2031    Annual Depression Screening  Completed    Pneumococcal Vaccine: Birth to 50yrs  Aged Out    Meningococcal B Vaccine  Aged Out         Aleksandra Lopez MD                  [1] No Known Allergies

## 2025-04-06 DIAGNOSIS — G89.29 CHRONIC MIDLINE BACK PAIN, UNSPECIFIED BACK LOCATION: ICD-10-CM

## 2025-04-06 DIAGNOSIS — M54.9 CHRONIC MIDLINE BACK PAIN, UNSPECIFIED BACK LOCATION: ICD-10-CM

## 2025-04-07 RX ORDER — CYCLOBENZAPRINE HCL 5 MG
5 TABLET ORAL NIGHTLY PRN
Qty: 14 TABLET | Refills: 0 | Status: SHIPPED | OUTPATIENT
Start: 2025-04-07

## 2025-04-07 RX ORDER — TIRZEPATIDE 12.5 MG/.5ML
12.5 INJECTION, SOLUTION SUBCUTANEOUS WEEKLY
Qty: 2 ML | Refills: 0 | OUTPATIENT
Start: 2025-04-07

## 2025-04-08 RX ORDER — TIRZEPATIDE 12.5 MG/.5ML
12.5 INJECTION, SOLUTION SUBCUTANEOUS WEEKLY
Qty: 2 ML | Refills: 0 | Status: SHIPPED | OUTPATIENT
Start: 2025-04-08

## 2025-05-01 ENCOUNTER — OFFICE VISIT (OUTPATIENT)
Dept: SURGERY | Facility: CLINIC | Age: 35
End: 2025-05-01
Payer: COMMERCIAL

## 2025-05-01 VITALS
HEIGHT: 61.4 IN | DIASTOLIC BLOOD PRESSURE: 50 MMHG | BODY MASS INDEX: 28.14 KG/M2 | WEIGHT: 151 LBS | SYSTOLIC BLOOD PRESSURE: 100 MMHG | OXYGEN SATURATION: 99 % | HEART RATE: 98 BPM | RESPIRATION RATE: 16 BRPM

## 2025-05-01 DIAGNOSIS — E66.3 OVERWEIGHT (BMI 25.0-29.9): ICD-10-CM

## 2025-05-01 DIAGNOSIS — R63.2 BINGE EATING: Primary | ICD-10-CM

## 2025-05-01 DIAGNOSIS — Z51.81 ENCOUNTER FOR THERAPEUTIC DRUG MONITORING: ICD-10-CM

## 2025-05-01 DIAGNOSIS — F43.9 STRESS: ICD-10-CM

## 2025-05-01 PROCEDURE — 99214 OFFICE O/P EST MOD 30 MIN: CPT | Performed by: INTERNAL MEDICINE

## 2025-05-01 PROCEDURE — 3078F DIAST BP <80 MM HG: CPT | Performed by: INTERNAL MEDICINE

## 2025-05-01 PROCEDURE — 3074F SYST BP LT 130 MM HG: CPT | Performed by: INTERNAL MEDICINE

## 2025-05-01 PROCEDURE — 3008F BODY MASS INDEX DOCD: CPT | Performed by: INTERNAL MEDICINE

## 2025-05-01 NOTE — PROGRESS NOTES
[facility], [facility], [place]  [address] [facility] [address]  [facility] [address]  Dept: [phone]       Patient:  Jennifer Donato  :      1990  MRN:      [id number]    Chief Complaint:    Chief Complaint   Patient presents with    Follow - Up    Weight Management       SUBJECTIVE     History of Present Illness:  Jennifer is being seen today for a follow-up for non surgical weight loss.     Past Medical History:   Past Medical History:    COVID-19    History of cholecystectomy    Obesity (BMI 30-39.9)        Comorbidities:  Back pain-Improvement?  yes, Joint pain-Improvement?  yes, IRASEMA-Improvement?  yes, and Snoring-Improvement?  yes    OBJECTIVE     Vitals: /50   Pulse 98   Resp 16   Ht 5' 1.4\" (1.56 m)   Wt 151 lb (68.5 kg)   LMP 2025 (Exact Date)   SpO2 99%   BMI 28.16 kg/m²     Initial weight loss: -00   Total weight loss: -35   Start weight: 185    Wt Readings from Last 3 Encounters:   25 151 lb (68.5 kg)   25 155 lb 9.6 oz (70.6 kg)   25 158 lb (71.7 kg)       Patient Medications:    Current Outpatient Medications   Medication Sig Dispense Refill    Tirzepatide (MOUNJARO) 12.5 MG/0.5ML Subcutaneous Solution Auto-injector Inject 12.5 mg into the skin once a week. 2 mL 0    cyclobenzaprine 5 MG Oral Tab Take 1 tablet (5 mg total) by mouth nightly as needed. Muscle spasm 14 tablet 0    ondansetron (ZOFRAN) 4 mg tablet Take 1 tablet (4 mg total) by mouth every 12 (twelve) hours as needed for Nausea. 20 tablet 0    venlafaxine ER 37.5 MG Oral Capsule SR 24 Hr Take 1 capsule (37.5 mg total) by mouth daily. 90 capsule 0    meclizine (DRAMAMINE) 25 MG Oral Tab Take 1 tablet (25 mg total) by mouth 3 (three) times daily as needed for Dizziness. 21 tablet 0    Lisdexamfetamine Dimesylate 60 MG Oral Cap Take 1 capsule (60 mg total) by mouth every morning. 30 capsule 0    FERROUS SULFATE 325 (65 Fe) MG Oral Tab TAKE 1 TABLET (325  MG TOTAL) BY MOUTH IN THE MORNING AND BEFORE BEDTIME (Patient not taking: Reported on 3/19/2025) 180 tablet 0    spironolactone 50 MG Oral Tab TAKE ONE TABLET FOR ONE WEEK THEN INCREASE TO TWICE DAILY STAY HYDRATED WHILE ON MEDICATION       Allergies:  Patient has no known allergies.     Social History:    Social History     Socioeconomic History    Marital status: Single     Spouse name: Not on file    Number of children: 1    Years of education: Not on file    Highest education level: Not on file   Occupational History    Not on file   Tobacco Use    Smoking status: Never    Smokeless tobacco: Never   Vaping Use    Vaping status: Never Used   Substance and Sexual Activity    Alcohol use: No    Drug use: Never    Sexual activity: Not on file   Other Topics Concern    Caffeine Concern Not Asked    Exercise Not Asked    Seat Belt Not Asked    Special Diet Not Asked    Stress Concern Not Asked    Weight Concern Not Asked   Social History Narrative    Not on file     Social Drivers of Health     Food Insecurity: Not on file   Transportation Needs: No Transportation Needs (2023)    Transportation Needs     Lack of Transportation: No   Stress: Not on file   Housing Stability: Not on file     Surgical History:    Past Surgical History:   Procedure Laterality Date                Cholecystectomy  2023    Ercp,insert stent,biliary/panc  02/15/2023    Laparoscopic cholecystectomy  2023    Leep       Family History:    Family History   Problem Relation Age of Onset    Lipids Father     Diabetes Mother     Diabetes Maternal Grandmother     Hypertension Maternal Grandmother     Stroke Maternal Grandmother     Other (Other) Maternal Grandmother         kidney disease    Diabetes Other     Breast Cancer Neg     Ovarian Cancer Neg     Colon Cancer Neg     Uterine Cancer Neg        Food Journal  Reviewed and Discussed:       Patient has a Food Journal?: yes   Patient is reading nutrition  labels?  yes  Average Caloric Intake:     Average CHO Intake: 130  Is patient exercising? yes  Type of exercise? walking    Eating Habits  Patient states the following:  Eats 2 meal(s) per day  Length of time it takes to consume a meal:  20  # of snacks per day: 1 Type of snacks:  trail mix  Amount of soda consumption per day:    Amount of water (in ounces) per day:  64  Drinking between meals only:  yes  Toughest challenge:  fatigue    Nutritional Goals  Limit carbohydrates to 100 gms per day, Eat 100-200 calories within 1 hour of waking , and Eat 3-4 cups of fresh fruits or vegetables daily    Behavior Modifications Reviewed and Discussed  Eat breakfast, Eat 3 meals per day, Plan meals in advance, Read nutrition labels, Drink 64 oz of water per day, Maintain a daily food journal, No drinking 30 minutes before or after meals, Utlize portion control strategies to reduce calorie intake, Identify triggers for eating and manage cues, and Eat slowly and take 20 to 30 minutes to complete each meal    Exercise Goals Reviewed and Discussed    Continue walking    ROS:    Constitutional: positive for fatigue  Respiratory: negative  Cardiovascular: negative  Gastrointestinal: negative  Musculoskeletal:positive for stiff joints  Neurological: negative  Behavioral/Psych: positive for depression  Endocrine: negative  All other systems were reviewed and are negative    Physical Exam:   General appearance: alert, appears stated age, cooperative, and mildly obese  Head: Normocephalic, without obvious abnormality, atraumatic  Back: symmetric, no curvature. ROM normal. No CVA tenderness.  Lungs: clear to auscultation bilaterally  Heart: S1, S2 normal, no murmur, click, rub or gallop, regular rate and rhythm  Abdomen: soft, non-tender; bowel sounds normal; no masses,  no organomegaly  Extremities: extremities normal, atraumatic, no cyanosis or edema  Pulses: 2+ and symmetric  Skin: Skin color, texture, turgor normal. No rashes or  lesions  Neurologic: Grossly normal    ASSESSMENT       Encounter Diagnosis(ses):   Encounter Diagnoses   Name Primary?    Binge eating Yes    Stress     Encounter for therapeutic drug monitoring     Overweight (BMI 25.0-29.9)        PLAN     Patient is not interested in bariatric surgery. Patient desires to pursue traditional weight loss at this time.      Stress: increased  Follow up with therapist as scheduled      Goals for next month:  1. Keep a food log.  2. Drink 48-64 ounces of non-caloric beverages per day. No fruit juices or regular soda.  3. Increase activity-upper body exercises, walk 10 minutes per day.  4. Increase fruit and vegetable servings to 5-6 per day.      Tolerating Vyvanse  Refill at current dose    Mounjaro via [facility]    Diagnoses and all orders for this visit:    Binge eating    Stress    Encounter for therapeutic drug monitoring    Overweight (BMI 25.0-29.9)          Larry Goldman MD

## 2025-05-02 DIAGNOSIS — R63.2 BINGE EATING: ICD-10-CM

## 2025-05-02 RX ORDER — LISDEXAMFETAMINE DIMESYLATE 60 MG/1
60 CAPSULE ORAL EVERY MORNING
Qty: 30 CAPSULE | Refills: 0 | Status: CANCELLED | OUTPATIENT
Start: 2025-05-02

## 2025-05-05 RX ORDER — LISDEXAMFETAMINE DIMESYLATE 50 MG/1
50 CAPSULE ORAL DAILY
Qty: 30 CAPSULE | Refills: 0 | Status: SHIPPED | OUTPATIENT
Start: 2025-05-05 | End: 2025-06-04

## 2025-05-05 RX ORDER — LISDEXAMFETAMINE DIMESYLATE 50 MG/1
50 CAPSULE ORAL DAILY
Qty: 30 CAPSULE | Refills: 0 | Status: SHIPPED | OUTPATIENT
Start: 2025-06-05 | End: 2025-07-05

## 2025-05-05 RX ORDER — LISDEXAMFETAMINE DIMESYLATE 50 MG/1
50 CAPSULE ORAL DAILY
Qty: 30 CAPSULE | Refills: 0 | Status: SHIPPED | OUTPATIENT
Start: 2025-07-06 | End: 2025-08-05

## 2025-06-12 RX ORDER — TIRZEPATIDE 12.5 MG/.5ML
12.5 INJECTION, SOLUTION SUBCUTANEOUS WEEKLY
Qty: 2 ML | Refills: 0 | Status: CANCELLED | OUTPATIENT
Start: 2025-06-12

## 2025-06-12 RX ORDER — TIRZEPATIDE 15 MG/.5ML
15 INJECTION, SOLUTION SUBCUTANEOUS
Qty: 2 ML | Refills: 2 | Status: SHIPPED | OUTPATIENT
Start: 2025-06-12

## 2025-06-16 RX ORDER — SPIRONOLACTONE 50 MG/1
TABLET, FILM COATED ORAL
Refills: 0 | OUTPATIENT
Start: 2025-06-16

## 2025-08-11 DIAGNOSIS — R63.2 BINGE EATING: ICD-10-CM

## 2025-08-11 RX ORDER — LISDEXAMFETAMINE DIMESYLATE 50 MG
50 CAPSULE ORAL DAILY
Qty: 30 CAPSULE | Refills: 0 | Status: SHIPPED | OUTPATIENT
Start: 2025-08-11

## 2025-08-25 ENCOUNTER — DOCUMENTATION ONLY (OUTPATIENT)
Dept: SURGERY | Facility: CLINIC | Age: 35
End: 2025-08-25

## 2025-08-26 RX ORDER — TIRZEPATIDE 15 MG/.5ML
15 INJECTION, SOLUTION SUBCUTANEOUS
Qty: 2 ML | Refills: 2 | Status: SHIPPED | OUTPATIENT
Start: 2025-08-26

## (undated) DEVICE — STANDARD HYPODERMIC NEEDLE,POLYPROPYLENE HUB: Brand: MONOJECT

## (undated) DEVICE — COLUMN DRAPE

## (undated) DEVICE — LOCKING DEVICE RX & BIOPSY CAP

## (undated) DEVICE — KIT CLEAN ENDOKIT 1.1OZ GOWNX2

## (undated) DEVICE — CONMED SCOPE SAVER BITE BLOCK, 20X27 MM: Brand: SCOPE SAVER

## (undated) DEVICE — GAMMEX® PI HYBRID SIZE 8, STERILE POWDER-FREE SURGICAL GLOVE, POLYISOPRENE AND NEOPRENE BLEND: Brand: GAMMEX

## (undated) DEVICE — MARYLAND BIPOLAR FORCEPS: Brand: ENDOWRIST

## (undated) DEVICE — PERMANENT CAUTERY HOOK: Brand: ENDOWRIST

## (undated) DEVICE — LINE MNTR ADLT SET O2 INTMD

## (undated) DEVICE — SYSTEM SURGYPAD VELCRO 36IN

## (undated) DEVICE — LAP CHOLE: Brand: MEDLINE INDUSTRIES, INC.

## (undated) DEVICE — YANKAUER SUCTION INSTRUMENT NO CONTROL VENT, BULB TIP, CLEAR: Brand: YANKAUER

## (undated) DEVICE — SOL NACL IRRIG 0.9% 1000ML BTL

## (undated) DEVICE — LAPAROVUE VISIBILITY SYSTEM LAPAROSCOPIC SOLUTIONS: Brand: LAPAROVUE

## (undated) DEVICE — COVER STND 54X23IN MAYO REINF

## (undated) DEVICE — TUBING MEGADYNE LAPAROSCOPIC

## (undated) DEVICE — KIT ENDO ORCAPOD 160/180/190

## (undated) DEVICE — CANNULATING SPHINCTEROTOME: Brand: TRUETOME JAG 39

## (undated) DEVICE — INSUFFLATION NEEDLE TO ESTABLISH PNEUMOPERITONEUM.: Brand: INSUFFLATION NEEDLE

## (undated) DEVICE — SUT VICRYL 0 J906G

## (undated) DEVICE — RETRIEVAL BALLOON CATHETER: Brand: EXTRACTOR™ PRO RX

## (undated) DEVICE — ACROBAT CALIBRATED TIP WIRE GUIDE: Brand: ACROBAT

## (undated) DEVICE — GOWN SURG AERO BLUE PERF XLG

## (undated) DEVICE — ARM DRAPE

## (undated) DEVICE — [HIGH FLOW INSUFFLATOR,  DO NOT USE IF PACKAGE IS DAMAGED,  KEEP DRY,  KEEP AWAY FROM SUNLIGHT,  PROTECT FROM HEAT AND RADIOACTIVE SOURCES.]: Brand: PNEUMOSURE

## (undated) DEVICE — UNDYED BRAIDED (POLYGLACTIN 910), SYNTHETIC ABSORBABLE SUTURE: Brand: COATED VICRYL

## (undated) DEVICE — 3M™ STERI-DRAPE™ INSTRUMENT POUCH 1018L: Brand: STERI-DRAPE™

## (undated) DEVICE — MEDI-VAC NON-CONDUCTIVE SUCTION TUBING: Brand: CARDINAL HEALTH

## (undated) DEVICE — FUSION WIRE GUIDE LOCKING DEVICE: Brand: FUSION

## (undated) DEVICE — CLIP HEMOLOK LARGE PURPLE

## (undated) DEVICE — CLOSURE EXOFIN 1.0ML

## (undated) DEVICE — BLADE 11 SHRP BP SS SRG STRL

## (undated) DEVICE — TISSUE RETRIEVAL SYSTEM: Brand: INZII RETRIEVAL SYSTEM

## (undated) DEVICE — SUCTION CANISTER, 3000CC,SAFELINER: Brand: DEROYAL

## (undated) DEVICE — LARGE HEM-O-LOK CLIP APPLIER: Brand: ENDOWRIST

## (undated) DEVICE — 3M™ STERI-DRAPE™ PATIENT ISOLATION DRAPE 1014: Brand: STERI-DRAPE™

## (undated) DEVICE — Device

## (undated) DEVICE — Device: Brand: JELCO

## (undated) DEVICE — SOL LACT RINGERS 1000ML

## (undated) DEVICE — CANNULA SEAL

## (undated) DEVICE — FUSION TITAN BILIARY DILATION BALLOON 3.2MM: Brand: FUSION TITAN

## (undated) DEVICE — REM POLYHESIVE ADULT PATIENT RETURN ELECTRODE: Brand: VALLEYLAB

## (undated) DEVICE — ENCORE® LATEX MICRO SIZE 8, STERILE LATEX POWDER-FREE SURGICAL GLOVE: Brand: ENCORE

## (undated) NOTE — LETTER
Tahir Rodriguez:    Here's the information we discussed. Please know that we are available to provide further assistance. At [facility], we feel that postpartum support is very important for new families.  Please see the enclosed new parent s the lower-level conference rooms (use [place] elevators). This in-person group is for new moms and their babies (up to 6months of age). Relatives and friends are welcome to attend with the new mom.  Includes breastfeeding support with an IBCLC (lactation cons topics for you and your children.  [email or web address]    Cradle Talk at [facility] and [facility] of [facility] AND [facility] Facebook Groups—You can find mom-to-mom advice and the list of speaker topics for [employer] Online program on these Facebook g

## (undated) NOTE — LETTER
AUTHORIZATION FOR SURGICAL OPERATION OR OTHER PROCEDURE    1.  I hereby authorize Dr. Debbie Sage, and [facility], [place], [facility] staff assigned to my case to perform the following operation and/or procedure at the [address]    2.  My physician has Relationship to Patient:           []  Parent    Responsible person                          []  Spouse  In case of minor or                    [] Other  _____________   Incompetent name:  __________________________________________________

## (undated) NOTE — LETTER
Date & Time: 9/14/2019, 10:09 AM  Patient: Dennis Delvalle  Encounter Provider(s):    Brayton Mohs, MD       To Whom It May Concern:    Dennis Delvalle was seen and treated in our department on 9/14/2019.  She can return to work with t

## (undated) NOTE — LETTER
[facility]  Administration of Anesthesia  1.  Aleksandra Caldwell, or _________________________________ acting on her behalf, (Patient) (Dependent/Representative) request to receive anesthesia for my pending procedure/operation/jennifer spinal bleeding, seizure, cardiac arrest and death. 7. AWARENESS: I understand that it is possible (but unlikely) to have explicit memory of events from the operating room while under general anesthesia.   8. ELECTROCONVULSIVE THERAPY PATIENTS: This consen signature below affirms that prior to the time of the procedure, I have explained to the patient and/or his/her guardian, the risks and benefits of undergoing anesthesia, as well as any reasonable alternatives.     __________________________________________

## (undated) NOTE — Clinical Note
TCM call completed. A TE was sent to the office for an appointment request. The patient reports being able to manage abdominal pain at home. Thank you.

## (undated) NOTE — LETTER
Doretha Javed [address], [place], [place]  [address]  INFORMED CONSENT FOR TRANSFUSION OF BLOOD OR BLOOD PRODUCTS  My physician has informed me of the nature, purpose, benefits and risks of transfusion for blood and blood components that he/she may deem necessary during my treatment or hospitalization. He/she has also discussed alternatives to receiving blood from the voluntary blood supply with me, such as self-donation (autologous) and directed donation (blood donated by family or friends to be used specifically for me). I further understand that while the [address] [place] [address] will attempt to supply any autologous or directed donor blood prior to transfusion of blood from the routine blood supply, medical circumstances may require that other or additional blood components may be required for my care. In giving consent, I acknowledge that my physician has also informed me that despite careful screening and testing in accordance with national and regional regulations, there is still a small risk of transmission of infectious agents including hepatitis, HIV-1/2, cytomegalovirus and other viruses or diseases as yet unknown for which licensed definitive screening tests do not currently exist. Additionally, my physician has informed me of the potential for transfusion reactions not related to an infectious agent. [  ]  Check here for Recurring Outpatient Transfusion Therapy (valid for 1 year) In addition to the above, my physician has informed me that I shall receive numerous transfusions over a period of time and that these can lead to other increased risks. I hereby authorize the transfusion of blood and/or blood products to me as deemed necessary and ordered by physicians participating in my care.  My physician has given me the opportunity to ask questions and any questions asked have been answered to my satisfaction  __________________________________________ ______________________________________________  (Signature of Patient)                                                            (Responsible party in case of Minor,                                                                                                 Incompetent, or unconscious Patient)  ___________________________________________       ________ ______________________________________  (Relationship to Patient)                                                       (Signature of Witness)  ______________________________________________________________________________________________   (Date)                                                                           (Time)  REFUSAL OF CONSENT FOR BLOOD TRANSFUSIONS   Sign only if Refusing   [  ] I understand refusal of blood or blood products as deemed necessary by my physician may have serious consequences to my condition to include possible death.  I hereby assume responsibility for my refusal and release the hospital, its personnel, and my physicians from any responsibility for the consequences of my refusal.    ________________________________________________________________________________  (Signature of Patient)                                                         (Responsible Party/Relationship to Patient)    ________________________________________________________________________________  (Signature of Witness)                                                       (Date/Time)     Patient Name: Kori Acuña     : 1990                 Printed: 2/15/2023      Medical Record #: [id number]                                 Page 1 of 1

## (undated) NOTE — Clinical Note
IUP at 13w5d  Normal NT ultrasound  Class I obesity complicating pregnancy  H/o     RECOMMENDATIONS:  Continue care with Dr. Delvin Lees ultrasound at 32 weeks. Weekly NST's at 36 weeks.   Level 2 ultrasound at 20 weeks  Limit gestati

## (undated) NOTE — LETTER
1/26/2022              Geraldine Amezquita [id number]         To Whom It May Concern,    The above named patient is currently under my medical care.  At this time, she is able to return back to work on 2/

## (undated) NOTE — LETTER
02/27/23        Patient: Latesha Cotto   YOB: 1990   Date of Visit: 2/27/2023       Dear  Dr. Ramírez Barriga MD,      Thank you for referring Latesha Cotto to my practice. Please find my assessment and plan below. Chronic cholecystitis  (primary encounter diagnosis)  Choledocholithiasis    Stable s/p robotic lap choley after ERCP/stent for choledocholithiasis. Recent dx H pylori. Limited activity for 3 weeks postop - note for work, labs ok today, if persistent sx for a few more weeks then GI and medical f/u sooner rather than later. GI - H. Pylori and stent management.                   Sincerely,   Lesa De La Rosa MD   [facility], [facility], [address] [place] [address]    Document electronically generated by:  Lesa De La Rosa MD

## (undated) NOTE — LETTER
[address] [facility] [address] [address], [place]  Authorization for Surgical Operation and Procedure                                                                                           1. I hereby authorize Larisa Perdomo MD, my physician and his/her assistants (if applicable), which may include medical students, residents, and/or fellows, to perform the following surgical operation/ procedure and administer such anesthesia as may be determined necessary by my physician: Operation/Procedure name (s) ENDOSCOPIC RETROGRADE CHOLANGIOPANCREATOGRAPHY (ERCP) on [address] [facility]   2. I recognize that during the surgical operation/procedure, unforeseen conditions may necessitate additional or different procedures than those listed above. I, therefore, further authorize and request that the above-named surgeon, assistants, or designees perform such procedures as are, in their judgment, necessary and desirable. 3.   My surgeon/physician has discussed prior to my surgery the potential benefits, risks and side effects of this procedure; the likelihood of achieving goals; and potential problems that might occur during recuperation. They also discussed reasonable alternatives to the procedure, including risks, benefits, and side effects related to the alternatives and risks related to not receiving this procedure. I have had all my questions answered and I acknowledge that no guarantee has been made as to the result that may be obtained. 4.   Should the need arise during my operation/procedure, which includes change of level of care prior to discharge, I also consent to the administration of blood and/or blood products. Further, I understand that despite careful testing and screening of blood or blood products by collecting agencies, I may still be subject to ill effects as a result of receiving a blood transfusion and/or blood products.   The following are some, but not all, of the potential risks that can occur: fever and allergic reactions, hemolytic reactions, transmission of diseases such as Hepatitis, AIDS and Cytomegalovirus (CMV) and fluid overload. In the event that I wish to have an autologous transfusion of my own blood, or a directed donor transfusion, I will discuss this with my physician. Check only if Refusing Blood or Blood Products  I understand refusal of blood or blood products as deemed necessary by my physician may have serious consequences to my condition to include possible death. I hereby assume responsibility for my refusal and release the hospital, its personnel, and my physicians from any responsibility for the consequences of my refusal.    o  Refuse   5. I authorize the use of any specimen, organs, tissues, body parts or foreign objects that may be removed from my body during the operation/procedure for diagnosis, research or teaching purposes and their subsequent disposal by hospital authorities. I also authorize the release of specimen test results and/or written reports to my treating physician on the hospital medical staff or other referring or consulting physicians involved in my care, at the discretion of the Pathologist or my treating physician. 6.   I consent to the photographing or videotaping of the operations or procedures to be performed, including appropriate portions of my body for medical, scientific, or educational purposes, provided my identity is not revealed by the pictures or by descriptive texts accompanying them. If the procedure has been photographed/videotaped, the surgeon will obtain the original picture, image, videotape or CD. The hospital will not be responsible for storage, release or maintenance of the picture, image, tape or CD.    7.   I consent to the presence of a  or observers in the operating room as deemed necessary by my physician or their designees.     8.   I recognize that in the event my procedure results in extended X-Ray/fluoroscopy time, I may develop a skin reaction. 9. If I have a Do Not Attempt Resuscitation (DNAR) order in place, that status will be suspended while in the operating room, procedural suite, and during the recovery period unless otherwise explicitly stated by me (or a person authorized to consent on my behalf). The surgeon or my attending physician will determine when the applicable recovery period ends for purposes of reinstating the DNAR order. 10. Patients having a sterilization procedure: I understand that if the procedure is successful the results will be permanent and it will therefore be impossible for me to inseminate, conceive, or bear children. I also understand that the procedure is intended to result in sterility, although the result has not been guaranteed. 11. I acknowledge that my physician has explained sedation/analgesia administration to me including the risk and benefits I consent to the administration of sedation/analgesia as may be necessary or desirable in the judgment of my physician. I CERTIFY THAT I HAVE READ AND FULLY UNDERSTAND THE ABOVE CONSENT TO OPERATION and/or OTHER PROCEDURE.     _________________________________________ _________________________________     ___________________________________  Signature of Patient     Signature of Responsible Person                   Printed Name of Responsible Person                              _________________________________________ ______________________________        ___________________________________  Signature of Witness         Date  Time         Relationship to Patient    STATEMENT OF PHYSICIAN My signature below affirms that prior to the time of the procedure; I have explained to the patient and/or his/her legal representative, the risks and benefits involved in the proposed treatment and any reasonable alternative to the proposed treatment.  I have also explained the risks and benefits involved in refusal of the proposed treatment and alternatives to the proposed treatment and have answered the patient's questions.  If I have a significant financial interest in a co-management agreement or a significant financial interest in any product or implant, or other significant relationship used in this procedure/surgery, I have disclosed this and had a discussion with my patient.     _______________________________________________________________ _____________________________  (Signature of Physician)                                                                                         (Date)                                   (Time)  Patient Name: Corinne Halt    : 1990   Printed: 2/15/2023      Medical Record #: [id number]                                              Page 1 of 1

## (undated) NOTE — MR AVS SNAPSHOT
After Visit Summary   11/2/2020    Jyoti Bonilla    MRN: [id number]           Visit Information     Date & Time  11/2/2020  1:20 PM Provider  Brielle Jasso MD [address] [place] [address], [address] [place] [address],[address], [facility].  Phone  [phone] [facility] now offers Video Visits through [address] for adult and pediatric patients. Video Visits are available Monday - Friday for many common conditions such as allergies, colds, cough, fever, rash, sore throat, headache and pink eye.   The cost for a Video Vi [address]   Monday – Friday  4:00 pm – 10:00 pm   Saturday – Sunday  10:00 am – 4:00 pm  W[facility]-IN CARE  Emergency Medicine Providers  Conditions needing urgent attention, but are   non-life-threatening.     Also available by appointment Average cost  $120*

## (undated) NOTE — LETTER
AUTHORIZATION FOR SURGICAL OPERATION OR OTHER PROCEDURE    1.  I hereby authorize Dr. Gerson Babb, and [facility] staff assigned to my case to perform the following operation and/or procedure at the [facility]: LEEP  ______________________ Patient signature:  ___________________________________________________             Relationship to Patient:           []  Parent    Responsible person                          []  Spouse  In case of minor or                    [] Other  _____________   In

## (undated) NOTE — LETTER
[address] [place] [address], [place]  Authorization for Surgical Operation and Procedure                                                                                           1. I hereby authorize Katja Post MD my physician and his/her assistants (if applicable), which may include medical students, residents, and/or fellows, to perform the following surgical operation/ procedure and administer such anesthesia as may be determined necessary by my physician: Operation/Procedure name (s) Laparoscopic possible open cholecystectomy on [address] [facility]   2. I recognize that during the surgical operation/procedure, unforeseen conditions may necessitate additional or different procedures than those listed above. I, therefore, further authorize and request that the above-named surgeon, assistants, or designees perform such procedures as are, in their judgment, necessary and desirable. 3.   My surgeon/physician has discussed prior to my surgery the potential benefits, risks and side effects of this procedure; the likelihood of achieving goals; and potential problems that might occur during recuperation. They also discussed reasonable alternatives to the procedure, including risks, benefits, and side effects related to the alternatives and risks related to not receiving this procedure. I have had all my questions answered and I acknowledge that no guarantee has been made as to the result that may be obtained. 4.   Should the need arise during my operation/procedure, which includes change of level of care prior to discharge, I also consent to the administration of blood and/or blood products. Further, I understand that despite careful testing and screening of blood or blood products by collecting agencies, I may still be subject to ill effects as a result of receiving a blood transfusion and/or blood products.   The following are some, but not all, of the potential risks that can occur: fever and allergic reactions, hemolytic reactions, transmission of diseases such as Hepatitis, AIDS and Cytomegalovirus (CMV) and fluid overload. In the event that I wish to have an autologous transfusion of my own blood, or a directed donor transfusion, I will discuss this with my physician. Check only if Refusing Blood or Blood Products  I understand refusal of blood or blood products as deemed necessary by my physician may have serious consequences to my condition to include possible death. I hereby assume responsibility for my refusal and release the hospital, its personnel, and my physicians from any responsibility for the consequences of my refusal.    o  Refuse   5. I authorize the use of any specimen, organs, tissues, body parts or foreign objects that may be removed from my body during the operation/procedure for diagnosis, research or teaching purposes and their subsequent disposal by hospital authorities. I also authorize the release of specimen test results and/or written reports to my treating physician on the hospital medical staff or other referring or consulting physicians involved in my care, at the discretion of the Pathologist or my treating physician. 6.   I consent to the photographing or videotaping of the operations or procedures to be performed, including appropriate portions of my body for medical, scientific, or educational purposes, provided my identity is not revealed by the pictures or by descriptive texts accompanying them. If the procedure has been photographed/videotaped, the surgeon will obtain the original picture, image, videotape or CD. The hospital will not be responsible for storage, release or maintenance of the picture, image, tape or CD.    7.   I consent to the presence of a  or observers in the operating room as deemed necessary by my physician or their designees.     8.   I recognize that in the event my procedure results in extended X-Ray/fluoroscopy time, I may develop a skin reaction. 9. If I have a Do Not Attempt Resuscitation (DNAR) order in place, that status will be suspended while in the operating room, procedural suite, and during the recovery period unless otherwise explicitly stated by me (or a person authorized to consent on my behalf). The surgeon or my attending physician will determine when the applicable recovery period ends for purposes of reinstating the DNAR order. 10. Patients having a sterilization procedure: I understand that if the procedure is successful the results will be permanent and it will therefore be impossible for me to inseminate, conceive, or bear children. I also understand that the procedure is intended to result in sterility, although the result has not been guaranteed. 11. I acknowledge that my physician has explained sedation/analgesia administration to me including the risk and benefits I consent to the administration of sedation/analgesia as may be necessary or desirable in the judgment of my physician. I CERTIFY THAT I HAVE READ AND FULLY UNDERSTAND THE ABOVE CONSENT TO OPERATION and/or OTHER PROCEDURE.     _________________________________________ _________________________________     ___________________________________  Signature of Patient     Signature of Responsible Person                   Printed Name of Responsible Person                              _________________________________________ ______________________________        ___________________________________  Signature of Witness         Date  Time         Relationship to Patient    STATEMENT OF PHYSICIAN My signature below affirms that prior to the time of the procedure; I have explained to the patient and/or his/her legal representative, the risks and benefits involved in the proposed treatment and any reasonable alternative to the proposed treatment.  I have also explained the risks and benefits involved in refusal of the proposed treatment and alternatives to the proposed treatment and have answered the patient's questions.  If I have a significant financial interest in a co-management agreement or a significant financial interest in any product or implant, or other significant relationship used in this procedure/surgery, I have disclosed this and had a discussion with my patient.     _______________________________________________________________ _____________________________  (Signature of Physician)                                                                                         (Date)                                   (Time)  Patient Name: Maria Ines Celaya    : 1990   Printed: 2023      Medical Record #: [id number]                                              Page 1 of 1

## (undated) NOTE — LETTER
[facility]  Administration of Anesthesia  1.  Adarsh Paz, or _________________________________ acting on her behalf, (Patient) (Dependent/Representative) request to receive anesthesia for my pending procedure/operation/jennifer spinal bleeding, seizure, cardiac arrest and death. 7. AWARENESS: I understand that it is possible (but unlikely) to have explicit memory of events from the operating room while under general anesthesia.   8. ELECTROCONVULSIVE THERAPY PATIENTS: This consen signature below affirms that prior to the time of the procedure, I have explained to the patient and/or his/her guardian, the risks and benefits of undergoing anesthesia, as well as any reasonable alternatives.     __________________________________________

## (undated) NOTE — LETTER
2/27/2023          To Whom It May Concern:    Eloy Dickey is currently under my medical care and may return to work on March 6, 2023. Activity is restricted as follows: No lifting over 15 pounds until March 13, 2023. If you require additional information please contact our office.         Sincerely,        Feliz Chatman MD          Document generated by:  [facility], RN

## (undated) NOTE — LETTER
7/15/2020              Geraldine Toure        [facility] [address]         Dear Mary Kebede,    After your last pap, Dr. Selena Bang MD recommended that you have a repeat pap  done in 6 months. The repeat pap is due in August 2020. Your healthcare needs are important to us. Please call the office [phone] as soon as possible to schedule this appointment.       Sincerely,    Norah Pimentel MD, MD  [place] [address] [place], [place] [address] [phone]

## (undated) NOTE — LETTER
4/3/2023              [place] [phone]         Dear Toy Rodriguez,    [address] Located within [facility] records indicate that the tests ordered for you by Miguelito Willard MD  have not been done. If you have, in fact, already completed the tests or you do not wish to have the tests done, please contact our office at [address] [place] [address]. Otherwise, please proceed with the testing. Enclosed is a duplicate order for your convenience.   Labs:       HELICOBACTER PYLORI BREATH TEST, ADULT (>17)      Sincerely,    Miguelito Willard MD  [facility], [address] [address] [phone]

## (undated) NOTE — LETTER
11/5/2021              Geraldine Vanegas [id number]         To Whom It May Concern,    The above named patient is currently under my prenatal care.  At this time, I am recommending that her last day of

## (undated) NOTE — LETTER
[address] [facility] [address] [place], [place]  Authorization for Invasive Procedure                                                                                           I hereby authorize Akila Snell MD, my physician and his/her assistants (if applicable), which may include medical students, residents, and/or fellows, to perform the following surgical operation/ procedure and administer such anesthesia as may be determined necessary by my physician: Operation/Procedure name (s) ENDOSCOPIC RETROGRADE CHOLANGIOPANCREATOGRAPHY (ERCP) w/biliary stent removal  [place] [address] [facility]   2. I recognize that during the surgical operation/procedure, unforeseen conditions may necessitate additional or different procedures than those listed above. I, therefore, further authorize and request that the above-named surgeon, assistants, or designees perform such procedures as are, in their judgment, necessary and desirable. 3.   My surgeon/physician has discussed prior to my surgery the potential benefits, risks and side effects of this procedure; the likelihood of achieving goals; and potential problems that might occur during recuperation. They also discussed reasonable alternatives to the procedure, including risks, benefits, and side effects related to the alternatives and risks related to not receiving this procedure. I have had all my questions answered and I acknowledge that no guarantee has been made as to the result that may be obtained. 4.   Should the need arise during my operation/procedure, which includes change of level of care prior to discharge, I also consent to the administration of blood and/or blood products. Further, I understand that despite careful testing and screening of blood or blood products by collecting agencies, I may still be subject to ill effects as a result of receiving a blood transfusion and/or blood products.   The following are some, but not all, of the potential risks that can occur: fever and allergic reactions, hemolytic reactions, transmission of diseases such as Hepatitis, AIDS and Cytomegalovirus (CMV) and fluid overload. In the event that I wish to have an autologous transfusion of my own blood, or a directed donor transfusion, I will discuss this with my physician. Check only if Refusing Blood or Blood Products  I understand refusal of blood or blood products as deemed necessary by my physician may have serious consequences to my condition to include possible death. I hereby assume responsibility for my refusal and release the hospital, its personnel, and my physicians from any responsibility for the consequences of my refusal.    o  Refuse   5. I authorize the use of any specimen, organs, tissues, body parts or foreign objects that may be removed from my body during the operation/procedure for diagnosis, research or teaching purposes and their subsequent disposal by hospital authorities. I also authorize the release of specimen test results and/or written reports to my treating physician on the hospital medical staff or other referring or consulting physicians involved in my care, at the discretion of the Pathologist or my treating physician. 6.   I consent to the photographing or videotaping of the operations or procedures to be performed, including appropriate portions of my body for medical, scientific, or educational purposes, provided my identity is not revealed by the pictures or by descriptive texts accompanying them. If the procedure has been photographed/videotaped, the surgeon will obtain the original picture, image, videotape or CD. The hospital will not be responsible for storage, release or maintenance of the picture, image, tape or CD.    7.   I consent to the presence of a  or observers in the operating room as deemed necessary by my physician or their designees.     8.   I recognize that in the event my procedure results in extended X-Ray/fluoroscopy time, I may develop a skin reaction. 9. If I have a Do Not Attempt Resuscitation (DNAR) order in place, that status will be suspended while in the operating room, procedural suite, and during the recovery period unless otherwise explicitly stated by me (or a person authorized to consent on my behalf). The surgeon or my attending physician will determine when the applicable recovery period ends for purposes of reinstating the DNAR order. 10. Patients having a sterilization procedure: I understand that if the procedure is successful the results will be permanent and it will therefore be impossible for me to inseminate, conceive, or bear children. I also understand that the procedure is intended to result in sterility, although the result has not been guaranteed. 11. I acknowledge that my physician has explained sedation/analgesia administration to me including the risk and benefits I consent to the administration of sedation/analgesia as may be necessary or desirable in the judgment of my physician. I CERTIFY THAT I HAVE READ AND FULLY UNDERSTAND THE ABOVE CONSENT TO OPERATION and/or OTHER PROCEDURE.     _________________________________________ _________________________________     ___________________________________  Signature of Patient     Signature of Responsible Person                   Printed Name of Responsible Person                              _________________________________________ ______________________________        ___________________________________  Signature of Witness         Date  Time         Relationship to Patient    STATEMENT OF PHYSICIAN My signature below affirms that prior to the time of the procedure; I have explained to the patient and/or his/her legal representative, the risks and benefits involved in the proposed treatment and any reasonable alternative to the proposed treatment.  I have also explained the risks and benefits involved in refusal of the proposed treatment and alternatives to the proposed treatment and have answered the patient's questions.  If I have a significant financial interest in a co-management agreement or a significant financial interest in any product or implant, or other significant relationship used in this procedure/surgery, I have disclosed this and had a discussion with my patient.     _______________________________________________________________ _____________________________  (Signature of Physician)                                                                                         (Date)                                   (Time)  Patient Name: Destiney Ross    : 1990   Printed: 2023      Medical Record #: [id number]                                              Page 1 of 1

## (undated) NOTE — Clinical Note
Thanks for the referral Will restart Phentermine at a lower dose 30 mg. Works better than the tablets.    Thanks [facility]

## (undated) NOTE — LETTER
2/12/2020              [address] [address]         Dear Jake Harvey,    After your last pap, Dr. Janice Stroud MD recommended that you have a repeat pap  done in 6 months. The repeat pap is due in March 2020. Your healthcare needs are important to us. Please call the office [phone] as soon as possible to schedule this appointment.     Sincerely,    Violeta Polo MD, MD [facility] , [address] [place] [address]  [address] [place], [place] [address] [phone]

## (undated) NOTE — LETTER
3/8/2023              [address] [place] [id number]         Dear Nathalie Altamirano,    [facility] records indicate that the tests ordered for you by Rosaline Byrne MD  have not been done. US GALLBLADDER ([id number]) (Order #[id number]) on 1/18/23  Please call [phone] to schedule your Ultrasound. If you have, in fact, already completed the tests or you do not wish to have the tests done, please contact our office at [address] [place] [address]. Otherwise, please proceed with the testing.         Sincerely,    Rosaline Byrne MD  [facility], [address] [address] [phone]

## (undated) NOTE — LETTER
VACCINE ADMINISTRATION RECORD  PARENT / GUARDIAN APPROVAL  Date: 10/5/2021  Vaccine administered to: Yajaira Nation     : 1990    MRN: [id number]    A copy of the appropriate Centers for Disease Control and Prevention Vaccine Information

## (undated) NOTE — LETTER
03/17/21        [address] [place] [id number]      Dear John De Luna,    [facility] records indicate that you have outstanding lab work and or testing that was ordered for you and has not yet been completed: labs    To provide you wit